# Patient Record
Sex: MALE | Race: WHITE | NOT HISPANIC OR LATINO | Employment: FULL TIME | URBAN - METROPOLITAN AREA
[De-identification: names, ages, dates, MRNs, and addresses within clinical notes are randomized per-mention and may not be internally consistent; named-entity substitution may affect disease eponyms.]

---

## 2017-08-30 ENCOUNTER — OUTSIDE FACILITY SERVICE (OUTPATIENT)
Dept: CARDIAC SURGERY | Facility: CLINIC | Age: 67
End: 2017-08-30

## 2017-08-30 PROCEDURE — 33523 CABG ART-VEIN SIX OR MORE: CPT | Performed by: THORACIC SURGERY (CARDIOTHORACIC VASCULAR SURGERY)

## 2017-08-30 PROCEDURE — 33508 ENDOSCOPIC VEIN HARVEST: CPT | Performed by: THORACIC SURGERY (CARDIOTHORACIC VASCULAR SURGERY)

## 2017-08-30 PROCEDURE — 33535 CABG ARTERIAL THREE: CPT | Performed by: THORACIC SURGERY (CARDIOTHORACIC VASCULAR SURGERY)

## 2017-09-13 ENCOUNTER — HOSPITAL ENCOUNTER (OUTPATIENT)
Dept: LAB | Facility: HOSPITAL | Age: 67
Discharge: HOME | End: 2017-09-13
Attending: FAMILY MEDICINE | Admitting: FAMILY MEDICINE

## 2017-10-09 ENCOUNTER — OFFICE VISIT (OUTPATIENT)
Dept: CARDIAC SURGERY | Facility: CLINIC | Age: 67
End: 2017-10-09

## 2017-10-09 VITALS
DIASTOLIC BLOOD PRESSURE: 78 MMHG | BODY MASS INDEX: 29.04 KG/M2 | HEIGHT: 68 IN | OXYGEN SATURATION: 97 % | RESPIRATION RATE: 20 BRPM | WEIGHT: 191.6 LBS | TEMPERATURE: 97.6 F | HEART RATE: 65 BPM | SYSTOLIC BLOOD PRESSURE: 122 MMHG

## 2017-10-09 DIAGNOSIS — Z95.1 S/P CABG (CORONARY ARTERY BYPASS GRAFT): Primary | ICD-10-CM

## 2017-10-09 PROCEDURE — 99024 POSTOP FOLLOW-UP VISIT: CPT | Performed by: NURSE PRACTITIONER

## 2017-10-09 RX ORDER — ALPRAZOLAM 0.25 MG/1
0.25 TABLET ORAL 2 TIMES DAILY PRN
COMMUNITY
End: 2017-10-09 | Stop reason: ALTCHOICE

## 2017-10-09 RX ORDER — HYDROCODONE BITARTRATE AND ACETAMINOPHEN 5; 325 MG/1; MG/1
TABLET ORAL
Refills: 0 | COMMUNITY
Start: 2017-09-08 | End: 2017-10-09

## 2017-10-09 RX ORDER — ASPIRIN 81 MG/1
TABLET ORAL
Refills: 0 | COMMUNITY
Start: 2017-09-08

## 2017-10-09 RX ORDER — METOCLOPRAMIDE 5 MG/1
TABLET ORAL
Refills: 0 | COMMUNITY
Start: 2017-09-08 | End: 2017-10-09 | Stop reason: ALTCHOICE

## 2017-10-09 RX ORDER — NITROGLYCERIN 2 %
OINTMENT (GRAM) TRANSDERMAL
Refills: 0 | COMMUNITY
Start: 2017-09-08 | End: 2017-10-09 | Stop reason: ALTCHOICE

## 2017-10-09 RX ORDER — AMIODARONE HYDROCHLORIDE 200 MG/1
TABLET ORAL
Refills: 0 | COMMUNITY
Start: 2017-09-08 | End: 2020-06-29

## 2017-10-09 RX ORDER — TRAZODONE HYDROCHLORIDE 50 MG/1
TABLET ORAL
Refills: 0 | COMMUNITY
Start: 2017-09-08 | End: 2017-10-09 | Stop reason: ALTCHOICE

## 2017-10-09 RX ORDER — HYDRALAZINE HYDROCHLORIDE 100 MG/1
50 TABLET, FILM COATED ORAL 2 TIMES DAILY
Refills: 0 | COMMUNITY
Start: 2017-09-08

## 2017-10-09 RX ORDER — ATORVASTATIN CALCIUM 40 MG/1
TABLET, FILM COATED ORAL
Refills: 0 | COMMUNITY
Start: 2017-09-08

## 2017-10-09 RX ORDER — PANTOPRAZOLE SODIUM 40 MG/1
TABLET, DELAYED RELEASE ORAL
Refills: 0 | COMMUNITY
Start: 2017-09-08 | End: 2017-10-09 | Stop reason: ALTCHOICE

## 2017-10-10 PROBLEM — Z95.1 S/P CABG (CORONARY ARTERY BYPASS GRAFT): Status: ACTIVE | Noted: 2017-10-10

## 2017-10-10 NOTE — PROGRESS NOTES
"10/10/2017        Subjective:      Amarilys Bear MD & Dr. Sanz    Chief Complaint of Post-op Follow-up      History of Present Illness:       Dear Dr. Amarilys Bear MD and Colleagues,  It was nice to see Gilberto Sanders in follow up today. He is status post CABG at [facility] by Dr. Kruger on 8/30/17. He reports he is doing very well.  He denies any c/o CP or SOA.  He has not been back to see Dr. Sanz but has an appt on 10/19/17.  His wife is with him today for his appt.  He wants to start driving and getting back to his \"life before surgery.\"    Patient Active Problem List   Diagnosis   • S/P CABG x10 by Dr. Kruger       Past Medical History:   Diagnosis Date   • Coronary artery disease    • Hyperlipidemia    • Hypertension    • Myocardial infarction        Past Surgical History:   Procedure Laterality Date   • APPENDECTOMY     • CORONARY ANGIOPLASTY     • CORONARY ARTERY BYPASS GRAFT  08/30/2017    X10 Dr Kruger       No Known Allergies    Review of Systems   Constitutional: Negative for fatigue.   Respiratory: Negative for shortness of breath.    Cardiovascular: Negative for chest pain, palpitations and leg swelling.   All other systems reviewed and are negative.        Current Outpatient Prescriptions:   •  amiodarone (PACERONE) 200 MG tablet, TAKE TWO (2) TABLETS BY MOUTH EVERY DAY, Disp: , Rfl: 0  •  aspirin  MG tablet, TAKE ONE (1) TABLET BY MOUTH EVERY DAY, Disp: , Rfl: 0  •  atorvastatin (LIPITOR) 40 MG tablet, TAKE ONE (1) TABLET BY MOUTH EVERY NIGHT AT BEDTIME, Disp: , Rfl: 0  •  hydrALAZINE (APRESOLINE) 100 MG tablet, TAKE ONE (1) TABLET BY MOUTH EVERY 8 HOURS, Disp: , Rfl: 0  •  metoprolol tartrate (LOPRESSOR) 25 MG tablet, TAKE ONE-HALF (1/2) TABLET BY MOUTH TWICE DAILY, Disp: , Rfl: 0    Social History     Social History   • Marital status:      Spouse name: N/A   • Number of children: N/A   • Years of education: N/A     Occupational History   • Not on file. "     Social History Main Topics   • Smoking status: Never Smoker   • Smokeless tobacco: Never Used   • Alcohol use No   • Drug use: No   • Sexual activity: Not on file     Other Topics Concern   • Not on file     Social History Narrative       No family history on file.        Physical Exam:      Vital Signs:  Weight: 191 lb 9.6 oz (86.9 kg)   Body mass index is 29.13 kg/(m^2).  Temp: 97.6 °F (36.4 °C)   Heart Rate: 65   BP: 122/78     Physical Exam   Constitutional: He is oriented to person, place, and time. He appears well-developed and well-nourished.   HENT:   Head: Normocephalic and atraumatic.   Cardiovascular: Normal rate, regular rhythm, normal heart sounds and intact distal pulses.    Pulmonary/Chest: Effort normal and breath sounds normal.   Neurological: He is alert and oriented to person, place, and time.   Skin: Skin is warm and dry.        Sternotomy & SVHS well healed.   Psychiatric: He has a normal mood and affect. His behavior is normal. Judgment and thought content normal.        Recommendation/Plan:     Gilberto CAROLANN Sanders was seen for post operative follow up. He is doing very well from a surgical standpoint. His incisions are healing well. I have released him to resume daily activities without restrictions. I told him he could drive short distances.  He has been released to PRN status.  Please call with any questions/concerns.    Thank you for allowing me to participate in his care.    Sincerely,    GEORGINA Blanchard

## 2017-10-12 ENCOUNTER — HOSPITAL ENCOUNTER (OUTPATIENT)
Dept: LAB | Facility: HOSPITAL | Age: 67
Discharge: HOME | End: 2017-10-12
Attending: FAMILY MEDICINE | Admitting: FAMILY MEDICINE

## 2017-10-12 LAB
ALBUMIN SERPL-MCNC: 4 G/DL (ref 3.5–4.8)
ALBUMIN/GLOB SERPL: 1.2 {RATIO} (ref 1–1.7)
ALP SERPL-CCNC: 70 IU/L (ref 32–91)
ALT SERPL-CCNC: ABNORMAL IU/L (ref 17–63)
ANION GAP SERPL CALC-SCNC: 13.3 MMOL/L (ref 10–20)
AST SERPL-CCNC: ABNORMAL IU/L (ref 15–41)
BACTERIA SPEC AEROBE CULT: NORMAL
BASOPHILS # BLD AUTO: 0.1 10*3/UL (ref 0–0.2)
BASOPHILS NFR BLD AUTO: 1 % (ref 0–2)
BILIRUB SERPL-MCNC: ABNORMAL MG/DL (ref 0.3–1.2)
BUN SERPL-MCNC: 24 MG/DL (ref 8–20)
BUN/CREAT SERPL: 20 (ref 6.2–20.3)
CALCIUM SERPL-MCNC: 10.2 MG/DL (ref 8.9–10.3)
CHLORIDE SERPL-SCNC: 104 MMOL/L (ref 101–111)
CHOLEST SERPL-MCNC: 128 MG/DL
CHOLEST/HDLC SERPL: 4.3 {RATIO}
CONV CO2: 25 MMOL/L (ref 22–32)
CONV LDL CHOLESTEROL DIRECT: 76 MG/DL (ref 0–100)
CONV TOTAL PROTEIN: 7.4 G/DL (ref 6.1–7.9)
CREAT UR-MCNC: 1.2 MG/DL (ref 0.7–1.2)
DIFFERENTIAL METHOD BLD: (no result)
EOSINOPHIL # BLD AUTO: 0.3 10*3/UL (ref 0–0.3)
EOSINOPHIL # BLD AUTO: 4 % (ref 0–3)
ERYTHROCYTE [DISTWIDTH] IN BLOOD BY AUTOMATED COUNT: 15 % (ref 11.5–14.5)
GLOBULIN UR ELPH-MCNC: 3.4 G/DL (ref 2.5–3.8)
GLUCOSE SERPL-MCNC: 81 MG/DL (ref 65–99)
HCT VFR BLD AUTO: 44.6 % (ref 40–54)
HDLC SERPL-MCNC: 30 MG/DL
HGB BLD-MCNC: 14.5 G/DL (ref 14–18)
LDLC/HDLC SERPL: 2.6 {RATIO}
LIPID INTERPRETATION: ABNORMAL
LYMPHOCYTES # BLD AUTO: 1.9 10*3/UL (ref 0.8–4.8)
LYMPHOCYTES NFR BLD AUTO: 23 % (ref 18–42)
Lab: NORMAL
MCH RBC QN AUTO: 30.8 PG (ref 26–32)
MCHC RBC AUTO-ENTMCNC: 32.6 G/DL (ref 32–36)
MCV RBC AUTO: 94.7 FL (ref 80–94)
MICRO REPORT STATUS: NORMAL
MONOCYTES # BLD AUTO: 0.7 10*3/UL (ref 0.1–1.3)
MONOCYTES NFR BLD AUTO: 9 % (ref 2–11)
NEUTROPHILS # BLD AUTO: 5.1 10*3/UL (ref 2.3–8.6)
NEUTROPHILS NFR BLD AUTO: 63 % (ref 50–75)
NRBC BLD AUTO-RTO: 0 /100{WBCS}
NRBC/RBC NFR BLD MANUAL: 0 10*3/UL
PLATELET # BLD AUTO: 233 10*3/UL (ref 150–450)
PMV BLD AUTO: 10.5 FL (ref 7.4–10.4)
POTASSIUM SERPL-SCNC: ABNORMAL MMOL/L (ref 3.6–5.1)
RBC # BLD AUTO: 4.71 10*6/UL (ref 4.6–6)
SODIUM SERPL-SCNC: 137 MMOL/L (ref 136–144)
SPECIMEN SOURCE: NORMAL
TRIGL SERPL-MCNC: 123 MG/DL
VLDLC SERPL CALC-MCNC: 21.7 MG/DL
WBC # BLD AUTO: 8.1 10*3/UL (ref 4.5–11.5)

## 2017-10-26 ENCOUNTER — HOSPITAL ENCOUNTER (OUTPATIENT)
Dept: OTHER | Facility: HOSPITAL | Age: 67
Setting detail: RECURRING SERIES
Discharge: HOME | End: 2018-02-13
Attending: INTERNAL MEDICINE | Admitting: INTERNAL MEDICINE

## 2018-01-02 ENCOUNTER — HOSPITAL ENCOUNTER (OUTPATIENT)
Dept: OTHER | Facility: HOSPITAL | Age: 68
Setting detail: RECURRING SERIES
Discharge: HOME | End: 2018-03-11
Attending: INTERNAL MEDICINE | Admitting: INTERNAL MEDICINE

## 2018-01-08 ENCOUNTER — HOSPITAL ENCOUNTER (OUTPATIENT)
Dept: PREADMISSION TESTING | Facility: HOSPITAL | Age: 68
Discharge: HOME | End: 2018-01-08
Attending: SURGERY | Admitting: SURGERY

## 2018-01-08 LAB
ANION GAP SERPL CALC-SCNC: 11 MMOL/L (ref 10–20)
BASOPHILS # BLD AUTO: 0.1 10*3/UL (ref 0–0.2)
BASOPHILS NFR BLD AUTO: 1 % (ref 0–2)
BUN SERPL-MCNC: 18 MG/DL (ref 8–20)
BUN/CREAT SERPL: 16.4 (ref 6.2–20.3)
CALCIUM SERPL-MCNC: 10.5 MG/DL (ref 8.9–10.3)
CHLORIDE SERPL-SCNC: 108 MMOL/L (ref 101–111)
CONV CO2: 26 MMOL/L (ref 22–32)
CREAT UR-MCNC: 1.1 MG/DL (ref 0.7–1.2)
DIFFERENTIAL METHOD BLD: (no result)
EOSINOPHIL # BLD AUTO: 0.3 10*3/UL (ref 0–0.3)
EOSINOPHIL # BLD AUTO: 3 % (ref 0–3)
ERYTHROCYTE [DISTWIDTH] IN BLOOD BY AUTOMATED COUNT: 15.8 % (ref 11.5–14.5)
GLUCOSE SERPL-MCNC: 87 MG/DL (ref 65–99)
HCT VFR BLD AUTO: 45.5 % (ref 40–54)
HGB BLD-MCNC: 14.7 G/DL (ref 14–18)
LYMPHOCYTES # BLD AUTO: 1.6 10*3/UL (ref 0.8–4.8)
LYMPHOCYTES NFR BLD AUTO: 20 % (ref 18–42)
MCH RBC QN AUTO: 31 PG (ref 26–32)
MCHC RBC AUTO-ENTMCNC: 32.3 G/DL (ref 32–36)
MCV RBC AUTO: 96 FL (ref 80–94)
MONOCYTES # BLD AUTO: 0.8 10*3/UL (ref 0.1–1.3)
MONOCYTES NFR BLD AUTO: 10 % (ref 2–11)
NEUTROPHILS # BLD AUTO: 5.1 10*3/UL (ref 2.3–8.6)
NEUTROPHILS NFR BLD AUTO: 66 % (ref 50–75)
NRBC BLD AUTO-RTO: 0 /100{WBCS}
NRBC/RBC NFR BLD MANUAL: 0 10*3/UL
PLATELET # BLD AUTO: 195 10*3/UL (ref 150–450)
PMV BLD AUTO: 10.5 FL (ref 7.4–10.4)
POTASSIUM SERPL-SCNC: 5 MMOL/L (ref 3.6–5.1)
RBC # BLD AUTO: 4.74 10*6/UL (ref 4.6–6)
SODIUM SERPL-SCNC: 140 MMOL/L (ref 136–144)
WBC # BLD AUTO: 7.9 10*3/UL (ref 4.5–11.5)

## 2018-01-25 ENCOUNTER — HOSPITAL ENCOUNTER (OUTPATIENT)
Dept: PREOP | Facility: HOSPITAL | Age: 68
Setting detail: HOSPITAL OUTPATIENT SURGERY
Discharge: HOME | End: 2018-01-25
Attending: SURGERY | Admitting: SURGERY

## 2018-03-07 ENCOUNTER — HOSPITAL ENCOUNTER (OUTPATIENT)
Dept: LAB | Facility: HOSPITAL | Age: 68
Discharge: HOME | End: 2018-03-07
Attending: FAMILY MEDICINE | Admitting: FAMILY MEDICINE

## 2018-03-07 LAB
ALBUMIN SERPL-MCNC: 4.3 G/DL (ref 3.5–4.8)
ALBUMIN/GLOB SERPL: 1.5 {RATIO} (ref 1–1.7)
ALP SERPL-CCNC: 62 IU/L (ref 32–91)
ALT SERPL-CCNC: 25 IU/L (ref 17–63)
ANION GAP SERPL CALC-SCNC: 10.6 MMOL/L (ref 10–20)
AST SERPL-CCNC: 23 IU/L (ref 15–41)
BASOPHILS # BLD AUTO: 0.1 10*3/UL (ref 0–0.2)
BASOPHILS NFR BLD AUTO: 1 % (ref 0–2)
BILIRUB SERPL-MCNC: 1.1 MG/DL (ref 0.3–1.2)
BUN SERPL-MCNC: 17 MG/DL (ref 8–20)
BUN/CREAT SERPL: 15.5 (ref 6.2–20.3)
CALCIUM SERPL-MCNC: 10.1 MG/DL (ref 8.9–10.3)
CHLORIDE SERPL-SCNC: 106 MMOL/L (ref 101–111)
CHOLEST SERPL-MCNC: 113 MG/DL
CHOLEST/HDLC SERPL: 4.5 {RATIO}
CONV CO2: 28 MMOL/L (ref 22–32)
CONV LDL CHOLESTEROL DIRECT: 57 MG/DL (ref 0–100)
CONV MICROALBUM.,U,RANDOM: 5 MG/L
CONV TOTAL PROTEIN: 7.1 G/DL (ref 6.1–7.9)
CREAT 24H UR-MCNC: 151 MG/DL
CREAT UR-MCNC: 1.1 MG/DL (ref 0.7–1.2)
DIFFERENTIAL METHOD BLD: (no result)
EOSINOPHIL # BLD AUTO: 0.2 10*3/UL (ref 0–0.3)
EOSINOPHIL # BLD AUTO: 4 % (ref 0–3)
ERYTHROCYTE [DISTWIDTH] IN BLOOD BY AUTOMATED COUNT: 14.9 % (ref 11.5–14.5)
GLOBULIN UR ELPH-MCNC: 2.8 G/DL (ref 2.5–3.8)
GLUCOSE SERPL-MCNC: 94 MG/DL (ref 65–99)
HCT VFR BLD AUTO: 47.6 % (ref 40–54)
HDLC SERPL-MCNC: 25 MG/DL
HGB BLD-MCNC: 15.4 G/DL (ref 14–18)
LDLC/HDLC SERPL: 2.2 {RATIO}
LIPID INTERPRETATION: ABNORMAL
LYMPHOCYTES # BLD AUTO: 1.9 10*3/UL (ref 0.8–4.8)
LYMPHOCYTES NFR BLD AUTO: 28 % (ref 18–42)
MCH RBC QN AUTO: 30.9 PG (ref 26–32)
MCHC RBC AUTO-ENTMCNC: 32.4 G/DL (ref 32–36)
MCV RBC AUTO: 95.2 FL (ref 80–94)
MICROALBUMIN/CREAT UR: 3.3 UG/MG
MONOCYTES # BLD AUTO: 0.6 10*3/UL (ref 0.1–1.3)
MONOCYTES NFR BLD AUTO: 9 % (ref 2–11)
NEUTROPHILS # BLD AUTO: 3.9 10*3/UL (ref 2.3–8.6)
NEUTROPHILS NFR BLD AUTO: 58 % (ref 50–75)
NRBC BLD AUTO-RTO: 0 /100{WBCS}
NRBC/RBC NFR BLD MANUAL: 0 10*3/UL
PLATELET # BLD AUTO: 191 10*3/UL (ref 150–450)
PMV BLD AUTO: 10.5 FL (ref 7.4–10.4)
POTASSIUM SERPL-SCNC: 4.6 MMOL/L (ref 3.6–5.1)
RBC # BLD AUTO: 5 10*6/UL (ref 4.6–6)
SODIUM SERPL-SCNC: 140 MMOL/L (ref 136–144)
TRIGL SERPL-MCNC: 154 MG/DL
URATE SERPL-MCNC: 4 MG/DL (ref 4.8–8.7)
VLDLC SERPL CALC-MCNC: 30.9 MG/DL
WBC # BLD AUTO: 6.6 10*3/UL (ref 4.5–11.5)

## 2018-06-28 ENCOUNTER — ON CAMPUS - OUTPATIENT (AMBULATORY)
Dept: URBAN - METROPOLITAN AREA HOSPITAL 2 | Facility: HOSPITAL | Age: 68
End: 2018-06-28
Payer: COMMERCIAL

## 2018-06-28 ENCOUNTER — OFFICE (AMBULATORY)
Dept: URBAN - METROPOLITAN AREA PATHOLOGY 4 | Facility: PATHOLOGY | Age: 68
End: 2018-06-28
Payer: COMMERCIAL

## 2018-06-28 ENCOUNTER — HOSPITAL ENCOUNTER (OUTPATIENT)
Dept: OTHER | Facility: HOSPITAL | Age: 68
Setting detail: SPECIMEN
Discharge: HOME | End: 2018-06-28
Attending: INTERNAL MEDICINE | Admitting: INTERNAL MEDICINE

## 2018-06-28 VITALS
DIASTOLIC BLOOD PRESSURE: 66 MMHG | DIASTOLIC BLOOD PRESSURE: 86 MMHG | OXYGEN SATURATION: 95 % | SYSTOLIC BLOOD PRESSURE: 124 MMHG | OXYGEN SATURATION: 93 % | SYSTOLIC BLOOD PRESSURE: 100 MMHG | SYSTOLIC BLOOD PRESSURE: 116 MMHG | DIASTOLIC BLOOD PRESSURE: 65 MMHG | OXYGEN SATURATION: 98 % | HEART RATE: 61 BPM | SYSTOLIC BLOOD PRESSURE: 156 MMHG | OXYGEN SATURATION: 99 % | HEART RATE: 63 BPM | SYSTOLIC BLOOD PRESSURE: 115 MMHG | SYSTOLIC BLOOD PRESSURE: 114 MMHG | RESPIRATION RATE: 18 BRPM | TEMPERATURE: 97.8 F | HEART RATE: 53 BPM | DIASTOLIC BLOOD PRESSURE: 72 MMHG | RESPIRATION RATE: 16 BRPM | HEART RATE: 66 BPM | SYSTOLIC BLOOD PRESSURE: 149 MMHG | DIASTOLIC BLOOD PRESSURE: 97 MMHG | DIASTOLIC BLOOD PRESSURE: 67 MMHG | HEART RATE: 60 BPM | HEART RATE: 64 BPM | HEART RATE: 65 BPM | SYSTOLIC BLOOD PRESSURE: 96 MMHG | SYSTOLIC BLOOD PRESSURE: 94 MMHG | DIASTOLIC BLOOD PRESSURE: 56 MMHG | WEIGHT: 206 LBS | HEART RATE: 52 BPM | DIASTOLIC BLOOD PRESSURE: 103 MMHG | OXYGEN SATURATION: 92 % | HEIGHT: 68 IN | DIASTOLIC BLOOD PRESSURE: 68 MMHG

## 2018-06-28 DIAGNOSIS — Z12.11 ENCOUNTER FOR SCREENING FOR MALIGNANT NEOPLASM OF COLON: ICD-10-CM

## 2018-06-28 DIAGNOSIS — D12.5 BENIGN NEOPLASM OF SIGMOID COLON: ICD-10-CM

## 2018-06-28 DIAGNOSIS — D12.2 BENIGN NEOPLASM OF ASCENDING COLON: ICD-10-CM

## 2018-06-28 LAB
GI HISTOLOGY: A. UNSPECIFIED: (no result)
GI HISTOLOGY: B. UNSPECIFIED: (no result)
GI HISTOLOGY: PDF REPORT: (no result)

## 2018-06-28 PROCEDURE — 88305 TISSUE EXAM BY PATHOLOGIST: CPT | Mod: 26 | Performed by: INTERNAL MEDICINE

## 2018-06-28 PROCEDURE — 45385 COLONOSCOPY W/LESION REMOVAL: CPT | Mod: PT | Performed by: INTERNAL MEDICINE

## 2018-06-28 RX ADMIN — PROPOFOL: 10 INJECTION, EMULSION INTRAVENOUS at 10:01

## 2018-10-11 ENCOUNTER — HOSPITAL ENCOUNTER (OUTPATIENT)
Dept: LAB | Facility: HOSPITAL | Age: 68
Discharge: HOME | End: 2018-10-11
Attending: FAMILY MEDICINE | Admitting: FAMILY MEDICINE

## 2018-10-11 LAB
ALBUMIN SERPL-MCNC: 4.1 G/DL (ref 3.5–4.8)
ALBUMIN/GLOB SERPL: 1.2 {RATIO} (ref 1–1.7)
ALP SERPL-CCNC: 64 IU/L (ref 32–91)
ALT SERPL-CCNC: 29 IU/L (ref 17–63)
ANION GAP SERPL CALC-SCNC: 12.8 MMOL/L (ref 10–20)
AST SERPL-CCNC: 25 IU/L (ref 15–41)
BILIRUB SERPL-MCNC: 1 MG/DL (ref 0.3–1.2)
BUN SERPL-MCNC: 17 MG/DL (ref 8–20)
BUN/CREAT SERPL: 15.5 (ref 6.2–20.3)
CALCIUM SERPL-MCNC: 10.2 MG/DL (ref 8.9–10.3)
CHLORIDE SERPL-SCNC: 108 MMOL/L (ref 101–111)
CHOLEST SERPL-MCNC: 102 MG/DL
CHOLEST/HDLC SERPL: 4.1 {RATIO}
CONV CO2: 28 MMOL/L (ref 22–32)
CONV LDL CHOLESTEROL DIRECT: 57 MG/DL (ref 0–100)
CONV TOTAL PROTEIN: 7.5 G/DL (ref 6.1–7.9)
CREAT UR-MCNC: 1.1 MG/DL (ref 0.7–1.2)
GLOBULIN UR ELPH-MCNC: 3.4 G/DL (ref 2.5–3.8)
GLUCOSE SERPL-MCNC: 92 MG/DL (ref 65–99)
HDLC SERPL-MCNC: 25 MG/DL
LDLC/HDLC SERPL: 2.3 {RATIO}
LIPID INTERPRETATION: ABNORMAL
POTASSIUM SERPL-SCNC: 4.8 MMOL/L (ref 3.6–5.1)
SODIUM SERPL-SCNC: 144 MMOL/L (ref 136–144)
TRIGL SERPL-MCNC: 167 MG/DL
URATE SERPL-MCNC: 4 MG/DL (ref 4.8–8.7)
VLDLC SERPL CALC-MCNC: 20.4 MG/DL

## 2019-06-01 ENCOUNTER — TRANSCRIBE ORDERS (OUTPATIENT)
Dept: CARDIOLOGY | Facility: CLINIC | Age: 69
End: 2019-06-01

## 2019-06-01 DIAGNOSIS — E78.5 HYPERLIPIDEMIA, UNSPECIFIED HYPERLIPIDEMIA TYPE: ICD-10-CM

## 2019-06-01 DIAGNOSIS — Z95.1 STATUS POST CORONARY ARTERY BYPASS GRAFT: ICD-10-CM

## 2019-06-01 DIAGNOSIS — I10 HYPERTENSION, UNSPECIFIED TYPE: ICD-10-CM

## 2019-06-01 DIAGNOSIS — I25.10 CVD (CARDIOVASCULAR DISEASE): Primary | ICD-10-CM

## 2019-08-16 PROBLEM — I10 HYPERTENSION: Status: ACTIVE | Noted: 2017-10-18

## 2019-08-16 PROBLEM — Z95.1 STATUS POST CORONARY ARTERY BYPASS GRAFT: Status: ACTIVE | Noted: 2017-10-18

## 2019-08-16 PROBLEM — L98.9 SKIN DISORDER: Status: ACTIVE | Noted: 2018-01-02

## 2019-08-16 PROBLEM — E78.5 HYPERLIPIDEMIA: Status: ACTIVE | Noted: 2017-10-18

## 2019-08-16 PROBLEM — Z95.5 STATUS POST CORONARY ARTERY STENT PLACEMENT: Status: ACTIVE | Noted: 2017-10-18

## 2019-08-16 PROBLEM — I25.10 CORONARY HEART DISEASE: Status: ACTIVE | Noted: 2017-10-18

## 2019-08-16 RX ORDER — ALLOPURINOL 300 MG/1
300 TABLET ORAL EVERY 24 HOURS
COMMUNITY
Start: 2017-10-19

## 2019-08-29 ENCOUNTER — HOSPITAL ENCOUNTER (OUTPATIENT)
Dept: CARDIOLOGY | Facility: HOSPITAL | Age: 69
Discharge: HOME | End: 2019-08-29

## 2019-08-29 ENCOUNTER — OFFICE VISIT (OUTPATIENT)
Dept: CARDIOLOGY | Facility: CLINIC | Age: 69
End: 2019-08-29

## 2019-08-29 VITALS
HEART RATE: 48 BPM | BODY MASS INDEX: 32.18 KG/M2 | WEIGHT: 205 LBS | DIASTOLIC BLOOD PRESSURE: 90 MMHG | HEIGHT: 67 IN | SYSTOLIC BLOOD PRESSURE: 160 MMHG

## 2019-08-29 DIAGNOSIS — I25.708 CORONARY ARTERY DISEASE OF BYPASS GRAFT OF NATIVE HEART WITH STABLE ANGINA PECTORIS (HCC): Primary | ICD-10-CM

## 2019-08-29 DIAGNOSIS — Z95.1 STATUS POST CORONARY ARTERY BYPASS GRAFT: ICD-10-CM

## 2019-08-29 DIAGNOSIS — E78.5 HYPERLIPIDEMIA, UNSPECIFIED HYPERLIPIDEMIA TYPE: ICD-10-CM

## 2019-08-29 DIAGNOSIS — I10 HYPERTENSION, UNSPECIFIED TYPE: ICD-10-CM

## 2019-08-29 DIAGNOSIS — I25.10 CVD (CARDIOVASCULAR DISEASE): ICD-10-CM

## 2019-08-29 DIAGNOSIS — I10 ESSENTIAL HYPERTENSION: ICD-10-CM

## 2019-08-29 DIAGNOSIS — E78.00 PURE HYPERCHOLESTEROLEMIA: ICD-10-CM

## 2019-08-29 LAB
BH CV STRESS COMMENTS STAGE 1: NORMAL
BH CV STRESS DOSE REGADENOSON STAGE 1: 0.4
BH CV STRESS DURATION MIN STAGE 1: 0
BH CV STRESS DURATION SEC STAGE 1: 10
BH CV STRESS PROTOCOL 1: NORMAL
BH CV STRESS RECOVERY BP: NORMAL MMHG
BH CV STRESS RECOVERY HR: 94 BPM
BH CV STRESS STAGE 1: 1
LV EF NUC BP: 59 %
MAXIMAL PREDICTED HEART RATE: 151 BPM
PERCENT MAX PREDICTED HR: 62.25 %
STRESS BASELINE BP: NORMAL MMHG
STRESS BASELINE HR: 48 BPM
STRESS PERCENT HR: 73 %
STRESS POST EXERCISE DUR MIN: 6 MIN
STRESS POST EXERCISE DUR SEC: 22 SEC
STRESS POST PEAK BP: NORMAL MMHG
STRESS POST PEAK HR: 94 BPM
STRESS TARGET HR: 128 BPM

## 2019-08-29 PROCEDURE — 78452 HT MUSCLE IMAGE SPECT MULT: CPT | Performed by: INTERNAL MEDICINE

## 2019-08-29 PROCEDURE — A9500 TC99M SESTAMIBI: HCPCS | Performed by: INTERNAL MEDICINE

## 2019-08-29 PROCEDURE — 93017 CV STRESS TEST TRACING ONLY: CPT

## 2019-08-29 PROCEDURE — 0 TECHNETIUM SESTAMIBI: Performed by: INTERNAL MEDICINE

## 2019-08-29 PROCEDURE — 78452 HT MUSCLE IMAGE SPECT MULT: CPT

## 2019-08-29 PROCEDURE — 93016 CV STRESS TEST SUPVJ ONLY: CPT | Performed by: INTERNAL MEDICINE

## 2019-08-29 PROCEDURE — 93018 CV STRESS TEST I&R ONLY: CPT | Performed by: INTERNAL MEDICINE

## 2019-08-29 PROCEDURE — 25010000002 REGADENOSON 0.4 MG/5ML SOLUTION: Performed by: INTERNAL MEDICINE

## 2019-08-29 PROCEDURE — 99213 OFFICE O/P EST LOW 20 MIN: CPT | Performed by: INTERNAL MEDICINE

## 2019-08-29 RX ADMIN — TECHNETIUM TC 99M SESTAMIBI 1 DOSE: 1 INJECTION INTRAVENOUS at 09:45

## 2019-08-29 RX ADMIN — REGADENOSON 0.4 MG: 0.08 INJECTION, SOLUTION INTRAVENOUS at 12:00

## 2019-08-29 NOTE — PROGRESS NOTES
"    Subjective:     Encounter Date:08/29/2019      Patient ID: Gilberto Sanders is a 69 y.o. male.    Chief Complaint:  History of Present Illness 69-year-old white male with history of coronary disease status post coronary artery bypass surgery history of hypertension hyperlipidemia presents to my office for follow-up.  Pain is currently stable without any signs of chest pain or shortness of breath at rest on exertion.  No complaint of any PND orthopnea.  No palpitations dizziness syncope or swelling of the feet.  He is taking his medicines regularly.  He does not smoke.  He is trying to exercise regularly.  He follows a good diet.    The following portions of the patient's history were reviewed and updated as appropriate: allergies, current medications, past family history, past medical history, past social history, past surgical history and problem list.  Past Medical History:   Diagnosis Date   • Coronary artery disease    • Hyperlipidemia    • Hypertension    • Myocardial infarction (CMS/HCC)      Past Surgical History:   Procedure Laterality Date   • APPENDECTOMY     • CORONARY ANGIOPLASTY     • CORONARY ARTERY BYPASS GRAFT  08/30/2017    X10 Dr Kruger     /90   Pulse (!) 48   Ht 170.2 cm (67\")   Wt 93 kg (205 lb)   BMI 32.11 kg/m²   Family History   Problem Relation Age of Onset   • Heart disease Father        Current Outpatient Medications:   •  allopurinol (ZYLOPRIM) 300 MG tablet, Daily., Disp: , Rfl:   •  aspirin  MG tablet, TAKE ONE (1) TABLET BY MOUTH EVERY DAY, Disp: , Rfl: 0  •  atorvastatin (LIPITOR) 40 MG tablet, TAKE ONE (1) TABLET BY MOUTH EVERY NIGHT AT BEDTIME, Disp: , Rfl: 0  •  hydrALAZINE (APRESOLINE) 100 MG tablet, TAKE ONE (1) TABLET BY MOUTH EVERY 8 HOURS, Disp: , Rfl: 0  •  metoprolol tartrate (LOPRESSOR) 25 MG tablet, TAKE ONE-HALF (1/2) TABLET BY MOUTH TWICE DAILY, Disp: , Rfl: 0  •  amiodarone (PACERONE) 200 MG tablet, TAKE TWO (2) TABLETS BY MOUTH EVERY DAY, Disp: , " Rfl: 0  No current facility-administered medications for this visit.     Facility-Administered Medications Ordered in Other Visits:   •  [COMPLETED] regadenoson (LEXISCAN) injection 0.4 mg, 0.4 mg, Intravenous, Once in imaging, Morris Sanz MD, 0.4 mg at 08/29/19 1200  No Known Allergies  Social History     Socioeconomic History   • Marital status:      Spouse name: Not on file   • Number of children: Not on file   • Years of education: Not on file   • Highest education level: Not on file   Tobacco Use   • Smoking status: Never Smoker   • Smokeless tobacco: Never Used   Substance and Sexual Activity   • Alcohol use: No   • Drug use: No     Review of Systems   Constitution: Negative for fever and malaise/fatigue.   Cardiovascular: Negative for chest pain, dyspnea on exertion and palpitations.   Respiratory: Negative for cough and shortness of breath.    Skin: Negative for rash.   Gastrointestinal: Negative for abdominal pain, nausea and vomiting.   Neurological: Negative for focal weakness and headaches.   All other systems reviewed and are negative.             Objective:     Physical Exam   Constitutional: He appears well-developed and well-nourished.   HENT:   Head: Normocephalic and atraumatic.   Eyes: Conjunctivae are normal. No scleral icterus.   Neck: Normal range of motion. Neck supple. No JVD present. Carotid bruit is not present.   Cardiovascular: Normal rate, regular rhythm, S1 normal, S2 normal, normal heart sounds and intact distal pulses. PMI is not displaced.   Pulmonary/Chest: Effort normal and breath sounds normal. He has no wheezes. He has no rales.   Abdominal: Soft. Bowel sounds are normal.   Neurological: He is alert. He has normal strength.   Skin: Skin is warm and dry. No rash noted.     Procedures    Lab Review:       Assessment:          Diagnosis Plan   1. Coronary artery disease of bypass graft of native heart with stable angina pectoris (CMS/HCC)     2. Pure hypercholesterolemia      3. Essential hypertension            Plan:       Patient has history of coronary disease status post coronary artery bypass surgery x10 vessels with normally function.  Patient has a stress test which showed inferoapical wall ischemia but since he does not have any symptoms he wants to try medical therapy.  If patient has any symptoms of angina or shortness of breath then will have a cardiac ablation performed.  Patient's blood pressure and heart are stable per  Patient lipid levels are followed by the primary care doctor.

## 2020-06-29 ENCOUNTER — OFFICE VISIT (OUTPATIENT)
Dept: CARDIOLOGY | Facility: CLINIC | Age: 70
End: 2020-06-29

## 2020-06-29 VITALS
WEIGHT: 209 LBS | SYSTOLIC BLOOD PRESSURE: 138 MMHG | HEART RATE: 42 BPM | OXYGEN SATURATION: 96 % | BODY MASS INDEX: 32.8 KG/M2 | HEIGHT: 67 IN | DIASTOLIC BLOOD PRESSURE: 73 MMHG

## 2020-06-29 DIAGNOSIS — I25.708 CORONARY ARTERY DISEASE OF BYPASS GRAFT OF NATIVE HEART WITH STABLE ANGINA PECTORIS (HCC): Primary | ICD-10-CM

## 2020-06-29 DIAGNOSIS — E78.00 PURE HYPERCHOLESTEROLEMIA: ICD-10-CM

## 2020-06-29 DIAGNOSIS — I10 ESSENTIAL HYPERTENSION: ICD-10-CM

## 2020-06-29 PROCEDURE — 99213 OFFICE O/P EST LOW 20 MIN: CPT | Performed by: INTERNAL MEDICINE

## 2020-06-29 NOTE — PROGRESS NOTES
"    Subjective:     Encounter Date:06/29/2020      Patient ID: Gilberto Sanders is a 70 y.o. male.    Chief Complaint:  History of Present Illness 70-year-old white male with history of coronary disease status post coronary bypass surgery history of hypertension hyperlipidemia presents to my office for follow-up.  Patient is currently stable without any symptoms of chest pain or shortness of breath at rest or exertion.  No complains of any PND orthopnea.  No palpitation dizziness syncope or swelling of the feet.  He is taking his medicine regularly.  He does not smoke.  Exercise regular.  He follows a good diet.    The following portions of the patient's history were reviewed and updated as appropriate: allergies, current medications, past family history, past medical history, past social history, past surgical history and problem list.  Past Medical History:   Diagnosis Date   • Coronary artery disease    • Hyperlipidemia    • Hypertension    • Myocardial infarction (CMS/HCC)      Past Surgical History:   Procedure Laterality Date   • APPENDECTOMY     • CORONARY ANGIOPLASTY     • CORONARY ARTERY BYPASS GRAFT  08/30/2017    X10 Dr Kruger     /73   Pulse (!) 42   Ht 170.2 cm (67\")   Wt 94.8 kg (209 lb)   SpO2 96%   BMI 32.73 kg/m²   Family History   Problem Relation Age of Onset   • Heart disease Father        Current Outpatient Medications:   •  allopurinol (ZYLOPRIM) 300 MG tablet, Daily., Disp: , Rfl:   •  aspirin  MG tablet, TAKE ONE (1) TABLET BY MOUTH EVERY DAY, Disp: , Rfl: 0  •  atorvastatin (LIPITOR) 40 MG tablet, TAKE ONE (1) TABLET BY MOUTH EVERY NIGHT AT BEDTIME, Disp: , Rfl: 0  •  hydrALAZINE (APRESOLINE) 100 MG tablet, TAKE ONE (1) TABLET BY MOUTH EVERY 8 HOURS, Disp: , Rfl: 0  •  metoprolol tartrate (LOPRESSOR) 25 MG tablet, TAKE ONE-HALF (1/2) TABLET BY MOUTH TWICE DAILY, Disp: , Rfl: 0  No Known Allergies  Social History     Socioeconomic History   • Marital status:      " Spouse name: Not on file   • Number of children: Not on file   • Years of education: Not on file   • Highest education level: Not on file   Tobacco Use   • Smoking status: Never Smoker   • Smokeless tobacco: Never Used   Substance and Sexual Activity   • Alcohol use: No   • Drug use: No     Review of Systems   Constitution: Negative for fever and malaise/fatigue.   Cardiovascular: Negative for chest pain, dyspnea on exertion, leg swelling and palpitations.   Respiratory: Negative for cough and shortness of breath.    Skin: Negative for rash.   Gastrointestinal: Negative for abdominal pain, nausea and vomiting.   Neurological: Negative for focal weakness, headaches, light-headedness and numbness.   All other systems reviewed and are negative.             Objective:     Physical Exam   Constitutional: He appears well-developed and well-nourished.   HENT:   Head: Normocephalic and atraumatic.   Eyes: Conjunctivae are normal. No scleral icterus.   Neck: Normal range of motion. Neck supple. No JVD present. Carotid bruit is not present.   Cardiovascular: Normal rate, regular rhythm, S1 normal, S2 normal, normal heart sounds and intact distal pulses. PMI is not displaced.   Pulmonary/Chest: Effort normal and breath sounds normal. He has no wheezes. He has no rales.   Abdominal: Soft. Bowel sounds are normal.   Neurological: He is alert. He has normal strength.   Skin: Skin is warm and dry. No rash noted.     Procedures    Lab Review:       Assessment:          Diagnosis Plan   1. Coronary artery disease of bypass graft of native heart with stable angina pectoris (CMS/[facility])     2. Pure hypercholesterolemia     3. Essential hypertension            Plan:       Patient has history of coronary status post coronary bypass surgery with normal LV function  Patient's blood pressure and heart rate stable  Patient's lipid levels are followed by the primary care doctor  Continue current medicines and follow him in 6

## 2021-01-18 ENCOUNTER — OFFICE VISIT (OUTPATIENT)
Dept: CARDIOLOGY | Facility: CLINIC | Age: 71
End: 2021-01-18

## 2021-01-18 VITALS
DIASTOLIC BLOOD PRESSURE: 81 MMHG | SYSTOLIC BLOOD PRESSURE: 143 MMHG | HEIGHT: 67 IN | OXYGEN SATURATION: 98 % | WEIGHT: 209 LBS | HEART RATE: 57 BPM | TEMPERATURE: 96.9 F | BODY MASS INDEX: 32.8 KG/M2

## 2021-01-18 DIAGNOSIS — I10 ESSENTIAL HYPERTENSION: ICD-10-CM

## 2021-01-18 DIAGNOSIS — I25.708 CORONARY ARTERY DISEASE OF BYPASS GRAFT OF NATIVE HEART WITH STABLE ANGINA PECTORIS (HCC): Primary | ICD-10-CM

## 2021-01-18 DIAGNOSIS — E78.00 PURE HYPERCHOLESTEROLEMIA: ICD-10-CM

## 2021-01-18 PROCEDURE — 93000 ELECTROCARDIOGRAM COMPLETE: CPT | Performed by: INTERNAL MEDICINE

## 2021-01-18 PROCEDURE — 99213 OFFICE O/P EST LOW 20 MIN: CPT | Performed by: INTERNAL MEDICINE

## 2021-01-18 NOTE — PROGRESS NOTES
"    Subjective:     Encounter Date:01/18/2021      Patient ID: Gilberto Sanders is a 70 y.o. male.    Chief Complaint:  History of Present Illness 70-year-old white male with history of coronary status post MI and coronary bypass surgery history of hypertension hyperlipidemia presents to my office for follow-up.  Patient is currently stable without any symptoms of chest pain or shortness of breath at rest or exertion.  No complains of any PND orthopnea.  No palpitation dizziness syncope or swelling of the feet.  Patient has been taking all the medicines regularly.  Patient does not smoke.  Patient has tried exercise regularly.  Follows a good diet.    The following portions of the patient's history were reviewed and updated as appropriate: allergies, current medications, past family history, past medical history, past social history, past surgical history and problem list.  Past Medical History:   Diagnosis Date   • Coronary artery disease    • Hyperlipidemia    • Hypertension    • Myocardial infarction (CMS/HCC)      Past Surgical History:   Procedure Laterality Date   • APPENDECTOMY     • CORONARY ANGIOPLASTY     • CORONARY ARTERY BYPASS GRAFT  08/30/2017    X10 Dr Kruger     /81 (BP Location: Left arm, Patient Position: Sitting)   Pulse 57   Temp 96.9 °F (36.1 °C)   Ht 170.2 cm (67\")   Wt 94.8 kg (209 lb)   SpO2 98%   BMI 32.73 kg/m²   Family History   Problem Relation Age of Onset   • Heart disease Father        Current Outpatient Medications:   •  allopurinol (ZYLOPRIM) 300 MG tablet, Daily., Disp: , Rfl:   •  aspirin 81 MG EC tablet, , Disp: , Rfl: 0  •  atorvastatin (LIPITOR) 40 MG tablet, TAKE ONE (1) TABLET BY MOUTH EVERY NIGHT AT BEDTIME, Disp: , Rfl: 0  •  hydrALAZINE (APRESOLINE) 100 MG tablet, TAKE ONE (1) TABLET BY MOUTH EVERY 8 HOURS, Disp: , Rfl: 0  •  metoprolol tartrate (LOPRESSOR) 25 MG tablet, TAKE ONE-HALF (1/2) TABLET BY MOUTH TWICE DAILY, Disp: , Rfl: 0  No Known Allergies  Social " History     Socioeconomic History   • Marital status:      Spouse name: Not on file   • Number of children: Not on file   • Years of education: Not on file   • Highest education level: Not on file   Tobacco Use   • Smoking status: Never Smoker   • Smokeless tobacco: Never Used   Substance and Sexual Activity   • Alcohol use: No   • Drug use: No     Review of Systems   Constitution: Negative for fever and malaise/fatigue.   Cardiovascular: Negative for chest pain, dyspnea on exertion and palpitations.   Respiratory: Negative for cough and shortness of breath.    Skin: Negative for rash.   Gastrointestinal: Negative for abdominal pain, nausea and vomiting.   Neurological: Negative for focal weakness and headaches.   All other systems reviewed and are negative.             Objective:     Constitutional:       Appearance: Well-developed.   Eyes:      General: No scleral icterus.     Conjunctiva/sclera: Conjunctivae normal.   HENT:      Head: Normocephalic and atraumatic.   Neck:      Musculoskeletal: Normal range of motion and neck supple.      Vascular: No carotid bruit or JVD.   Pulmonary:      Effort: Pulmonary effort is normal.      Breath sounds: Normal breath sounds. No wheezing. No rales.   Cardiovascular:      Normal rate. Regular rhythm.   Pulses:     Intact distal pulses.   Abdominal:      General: Bowel sounds are normal.      Palpations: Abdomen is soft.   Skin:     General: Skin is warm and dry.      Findings: No rash.   Neurological:      Mental Status: Alert.         ECG 12 Lead    Date/Time: 1/18/2021 1:59 PM  Performed by: Morris Sanz MD  Authorized by: Morris Sanz MD   Comments: Sinus rhythm with PVCs  Abnormal EKG  No new changes from previous EKG            Lab Review:         MDM   #1 coronary artery disease    patient had a coronary artery bypass surgery x10 vessels and has normal LV systolic function is currently stable on medical therapy  2.  Hypertension  Patient blood pressure is  currently stable on medications including beta-blockers and hydralazine  Patient not on ACE inhibitor's because of renal insufficiency  3.  Hyperlipidemia  Patient has been on statins and his lipid levels are followed by the primary care doctor.

## 2021-08-04 ENCOUNTER — OFFICE VISIT (OUTPATIENT)
Dept: CARDIOLOGY | Facility: CLINIC | Age: 71
End: 2021-08-04

## 2021-08-04 VITALS
WEIGHT: 196 LBS | OXYGEN SATURATION: 97 % | SYSTOLIC BLOOD PRESSURE: 153 MMHG | HEIGHT: 67 IN | BODY MASS INDEX: 30.76 KG/M2 | HEART RATE: 53 BPM | DIASTOLIC BLOOD PRESSURE: 83 MMHG

## 2021-08-04 DIAGNOSIS — I10 ESSENTIAL HYPERTENSION: ICD-10-CM

## 2021-08-04 DIAGNOSIS — I25.708 CORONARY ARTERY DISEASE OF BYPASS GRAFT OF NATIVE HEART WITH STABLE ANGINA PECTORIS (HCC): Primary | ICD-10-CM

## 2021-08-04 DIAGNOSIS — E78.00 PURE HYPERCHOLESTEROLEMIA: ICD-10-CM

## 2021-08-04 PROCEDURE — 99213 OFFICE O/P EST LOW 20 MIN: CPT | Performed by: INTERNAL MEDICINE

## 2021-08-04 NOTE — PROGRESS NOTES
"    Subjective:     Encounter Date:08/04/2021      Patient ID: Gilberto Sanders is a 71 y.o. male.    Chief Complaint:  History of Present Illness 71-year-old white male with history of coronary disease hypertension hyperlipidemia presents to my office for follow-up.  Patient is currently stable without any symptoms of chest pain or shortness of breath at rest on exertion.  No complaints any PND orthopnea.  No palpitation dizziness syncope or swelling of the feet but is taking medicine regularly but he does not smoke but is trying exercise regularly follows a good diet    The following portions of the patient's history were reviewed and updated as appropriate: allergies, current medications, past family history, past medical history, past social history, past surgical history and problem list.  Past Medical History:   Diagnosis Date   • Coronary artery disease    • Hyperlipidemia    • Hypertension    • Myocardial infarction (CMS/HCC)      Past Surgical History:   Procedure Laterality Date   • APPENDECTOMY     • CORONARY ANGIOPLASTY     • CORONARY ARTERY BYPASS GRAFT  08/30/2017    X10 Dr Kruger     /83 (BP Location: Left arm, Patient Position: Sitting)   Pulse 53   Ht 170.2 cm (67\")   Wt 88.9 kg (196 lb)   SpO2 97%   BMI 30.70 kg/m²   Family History   Problem Relation Age of Onset   • Heart disease Father        Current Outpatient Medications:   •  allopurinol (ZYLOPRIM) 300 MG tablet, Daily., Disp: , Rfl:   •  aspirin 81 MG EC tablet, , Disp: , Rfl: 0  •  atorvastatin (LIPITOR) 40 MG tablet, TAKE ONE (1) TABLET BY MOUTH EVERY NIGHT AT BEDTIME, Disp: , Rfl: 0  •  hydrALAZINE (APRESOLINE) 100 MG tablet, TAKE ONE (1) TABLET BY MOUTH EVERY 8 HOURS, Disp: , Rfl: 0  •  metoprolol tartrate (LOPRESSOR) 25 MG tablet, TAKE ONE-HALF (1/2) TABLET BY MOUTH TWICE DAILY, Disp: , Rfl: 0  No Known Allergies  Social History     Socioeconomic History   • Marital status:      Spouse name: Not on file   • Number of " children: Not on file   • Years of education: Not on file   • Highest education level: Not on file   Tobacco Use   • Smoking status: Never Smoker   • Smokeless tobacco: Never Used   Substance and Sexual Activity   • Alcohol use: No   • Drug use: No     Review of Systems   Constitutional: Negative for fever and malaise/fatigue.   Cardiovascular: Negative for chest pain, dyspnea on exertion and palpitations.   Respiratory: Negative for cough and shortness of breath.    Skin: Negative for rash.   Gastrointestinal: Negative for abdominal pain, nausea and vomiting.   Neurological: Negative for focal weakness and headaches.   All other systems reviewed and are negative.             Objective:     Constitutional:       Appearance: Well-developed.   Eyes:      General: No scleral icterus.     Conjunctiva/sclera: Conjunctivae normal.   HENT:      Head: Normocephalic and atraumatic.   Neck:      Vascular: No carotid bruit or JVD.   Pulmonary:      Effort: Pulmonary effort is normal.      Breath sounds: Normal breath sounds. No wheezing. No rales.   Cardiovascular:      Normal rate. Regular rhythm.   Pulses:     Intact distal pulses.   Abdominal:      General: Bowel sounds are normal.      Palpations: Abdomen is soft.   Musculoskeletal:      Cervical back: Normal range of motion and neck supple. Skin:     General: Skin is warm and dry.      Findings: No rash.   Neurological:      Mental Status: Alert.       Procedures    Lab Review:         MDM  1.  Coronary disease  Patient had coronary bypass surgery x4 vessels with a LIMA to LAD and saphenous graft to the diagonal branch marginal branch and RCA  Patient has normal LV systolic function  2.  Hypertension  Patient blood pressure currently stable on medications  3.  Hyperlipidemia  Patient's lipid levels are followed by the primary care doctor.      Patient's previous medical records, labs, and EKG were reviewed and discussed with the patient at today's visit.

## 2022-02-24 ENCOUNTER — OFFICE VISIT (OUTPATIENT)
Dept: CARDIOLOGY | Facility: CLINIC | Age: 72
End: 2022-02-24

## 2022-02-24 VITALS
SYSTOLIC BLOOD PRESSURE: 130 MMHG | WEIGHT: 195 LBS | OXYGEN SATURATION: 96 % | BODY MASS INDEX: 30.61 KG/M2 | DIASTOLIC BLOOD PRESSURE: 75 MMHG | HEIGHT: 67 IN | HEART RATE: 67 BPM

## 2022-02-24 DIAGNOSIS — E78.00 PURE HYPERCHOLESTEROLEMIA: ICD-10-CM

## 2022-02-24 DIAGNOSIS — I25.708 CORONARY ARTERY DISEASE OF BYPASS GRAFT OF NATIVE HEART WITH STABLE ANGINA PECTORIS: Primary | ICD-10-CM

## 2022-02-24 DIAGNOSIS — I10 ESSENTIAL HYPERTENSION: ICD-10-CM

## 2022-02-24 PROCEDURE — 99213 OFFICE O/P EST LOW 20 MIN: CPT | Performed by: INTERNAL MEDICINE

## 2022-02-24 NOTE — PROGRESS NOTES
"    Subjective:     Encounter Date:02/24/2022      Patient ID: Gilberto Sanders is a 71 y.o. male.    Chief Complaint:  History of Present Illness 71-year-old white male with history of coronary status post carotid bypass surgery hypertension hyperlipidemia presents to my office for follow-up.  Patient is currently stable without any symptoms of chest pain or shortness of breath at rest on exertion.  No complains of any PND orthopnea.  No palpitation dizziness syncope or swelling of the feet.  Patient has been taking all the medicines regularly.  Patient does not smoke.  Patient is trying to exercise regularly.  Follows a good diet.    The following portions of the patient's history were reviewed and updated as appropriate: allergies, current medications, past family history, past medical history, past social history, past surgical history and problem list.  Past Medical History:   Diagnosis Date   • Coronary artery disease    • Hyperlipidemia    • Hypertension    • Myocardial infarction (HCC)      Past Surgical History:   Procedure Laterality Date   • APPENDECTOMY     • CORONARY ANGIOPLASTY     • CORONARY ARTERY BYPASS GRAFT  08/30/2017    X10 Dr Krguer   • SKIN CANCER EXCISION       /75 (BP Location: Left arm, Patient Position: Sitting)   Pulse 67   Ht 170.2 cm (67\")   Wt 88.5 kg (195 lb)   SpO2 96%   BMI 30.54 kg/m²   Family History   Problem Relation Age of Onset   • Heart disease Father        Current Outpatient Medications:   •  allopurinol (ZYLOPRIM) 300 MG tablet, Daily., Disp: , Rfl:   •  aspirin 81 MG EC tablet, , Disp: , Rfl: 0  •  atorvastatin (LIPITOR) 40 MG tablet, TAKE ONE (1) TABLET BY MOUTH EVERY NIGHT AT BEDTIME, Disp: , Rfl: 0  •  hydrALAZINE (APRESOLINE) 100 MG tablet, TAKE ONE (1) TABLET BY MOUTH EVERY 8 HOURS, Disp: , Rfl: 0  •  metoprolol tartrate (LOPRESSOR) 25 MG tablet, TAKE ONE-HALF (1/2) TABLET BY MOUTH TWICE DAILY, Disp: , Rfl: 0  No Known Allergies  Social History "     Socioeconomic History   • Marital status:    Tobacco Use   • Smoking status: Never Smoker   • Smokeless tobacco: Never Used   Substance and Sexual Activity   • Alcohol use: No   • Drug use: No     Review of Systems   Constitutional: Negative for fever and malaise/fatigue.   Cardiovascular: Negative for chest pain, dyspnea on exertion and palpitations.   Respiratory: Negative for cough and shortness of breath.    Skin: Negative for rash.   Gastrointestinal: Negative for abdominal pain, nausea and vomiting.   Neurological: Negative for focal weakness and headaches.   All other systems reviewed and are negative.             Objective:     Constitutional:       Appearance: Well-developed.   Eyes:      General: No scleral icterus.     Conjunctiva/sclera: Conjunctivae normal.   HENT:      Head: Normocephalic and atraumatic.   Neck:      Vascular: No carotid bruit or JVD.   Pulmonary:      Effort: Pulmonary effort is normal.      Breath sounds: Normal breath sounds. No wheezing. No rales.   Cardiovascular:      Normal rate. Regular rhythm.   Pulses:     Intact distal pulses.   Abdominal:      General: Bowel sounds are normal.      Palpations: Abdomen is soft.   Musculoskeletal:      Cervical back: Normal range of motion and neck supple. Skin:     General: Skin is warm and dry.      Findings: No rash.   Neurological:      Mental Status: Alert.       Procedures    Lab Review:         MDM  1.  Coronary disease  Patient had coronary bypass surgery x10 vessels and has normal function is currently stable  2.  Hypertension  Patient blood pressure currently stable on medications  3.  Hyperlipidemia  Patient is on statins and the lipid levels are well within normal limits.      Patient's previous medical records, labs, and EKG were reviewed and discussed with the patient at today's visit.

## 2022-10-12 ENCOUNTER — OFFICE VISIT (OUTPATIENT)
Dept: CARDIOLOGY | Facility: CLINIC | Age: 72
End: 2022-10-12

## 2022-10-12 VITALS
HEIGHT: 67 IN | HEART RATE: 56 BPM | BODY MASS INDEX: 30.92 KG/M2 | OXYGEN SATURATION: 97 % | DIASTOLIC BLOOD PRESSURE: 88 MMHG | WEIGHT: 197 LBS | SYSTOLIC BLOOD PRESSURE: 150 MMHG

## 2022-10-12 DIAGNOSIS — I10 ESSENTIAL HYPERTENSION: ICD-10-CM

## 2022-10-12 DIAGNOSIS — E78.00 PURE HYPERCHOLESTEROLEMIA: ICD-10-CM

## 2022-10-12 DIAGNOSIS — I25.708 CORONARY ARTERY DISEASE OF BYPASS GRAFT OF NATIVE HEART WITH STABLE ANGINA PECTORIS: Primary | ICD-10-CM

## 2022-10-12 PROCEDURE — 99213 OFFICE O/P EST LOW 20 MIN: CPT | Performed by: INTERNAL MEDICINE

## 2022-10-12 NOTE — PROGRESS NOTES
"    Subjective:     Encounter Date:10/12/2022      Patient ID: Gilberto Sanders is a 72 y.o. male.    Chief Complaint:  History of Present Illness 72-year-old white male with history of coronary disease hypertension hyperlipidemia presents to my office for follow-up.  Patient is currently stable without any symptoms of chest pain or shortness of breath at rest on exertion.  No complains any PND orthopnea.  No palpitation dizziness syncope or swelling of the feet.  Patient has been taking all the medicines regularly.  Patient does not smoke.  Patient is trying to exercise regular.  Patient follows a good diet.    The following portions of the patient's history were reviewed and updated as appropriate: allergies, current medications, past family history, past medical history, past social history, past surgical history and problem list.  Past Medical History:   Diagnosis Date   • Coronary artery disease    • Hyperlipidemia    • Hypertension    • Myocardial infarction (HCC)      Past Surgical History:   Procedure Laterality Date   • APPENDECTOMY     • CORONARY ANGIOPLASTY     • CORONARY ARTERY BYPASS GRAFT  08/30/2017    X10 Dr Kruger   • SKIN CANCER EXCISION       /88   Pulse 56   Ht 170.2 cm (67\")   Wt 89.4 kg (197 lb)   SpO2 97%   BMI 30.85 kg/m²   Family History   Problem Relation Age of Onset   • Heart disease Father        Current Outpatient Medications:   •  allopurinol (ZYLOPRIM) 300 MG tablet, Take 1 tablet by mouth Daily., Disp: , Rfl:   •  aspirin 81 MG EC tablet, , Disp: , Rfl: 0  •  atorvastatin (LIPITOR) 40 MG tablet, TAKE ONE (1) TABLET BY MOUTH EVERY NIGHT AT BEDTIME, Disp: , Rfl: 0  •  hydrALAZINE (APRESOLINE) 100 MG tablet, Take 50 mg by mouth 2 (Two) Times a Day., Disp: , Rfl: 0  •  metoprolol tartrate (LOPRESSOR) 25 MG tablet, TAKE ONE-HALF (1/2) TABLET BY MOUTH TWICE DAILY, Disp: , Rfl: 0  No Known Allergies  Social History     Socioeconomic History   • Marital status:    Tobacco " Use   • Smoking status: Never   • Smokeless tobacco: Never   Substance and Sexual Activity   • Alcohol use: No   • Drug use: No     Review of Systems   Constitutional: Negative for fever and malaise/fatigue.   Cardiovascular: Negative for chest pain, dyspnea on exertion and palpitations.   Respiratory: Negative for cough and shortness of breath.    Skin: Negative for rash.   Gastrointestinal: Negative for abdominal pain, nausea and vomiting.   Neurological: Negative for focal weakness and headaches.   All other systems reviewed and are negative.             Objective:     Constitutional:       Appearance: Well-developed.   Eyes:      General: No scleral icterus.     Conjunctiva/sclera: Conjunctivae normal.   HENT:      Head: Normocephalic and atraumatic.   Neck:      Vascular: No carotid bruit or JVD.   Pulmonary:      Effort: Pulmonary effort is normal.      Breath sounds: Normal breath sounds. No wheezing. No rales.   Cardiovascular:      Normal rate. Regular rhythm.   Pulses:     Intact distal pulses.   Abdominal:      General: Bowel sounds are normal.      Palpations: Abdomen is soft.   Musculoskeletal:      Cervical back: Normal range of motion and neck supple. Skin:     General: Skin is warm and dry.      Findings: No rash.   Neurological:      Mental Status: Alert.       Procedures    Lab Review:         MDM  1.  Coronary disease  Patient had coronary bypass surgery x10 vessels with a LIMA to the LAD and a saphenous graft to the other branches and has normal function is currently stable on medications  2.  Hypertension  Patient blood pressure currently stable on hydralazine and metoprolol  3.  Hyperlipidemia  Pains on Lipitor and the lipid levels are well within normal limits.      Patient's previous medical records, labs, and EKG were reviewed and discussed with the patient at today's visit.

## 2023-04-12 ENCOUNTER — OFFICE VISIT (OUTPATIENT)
Dept: CARDIOLOGY | Facility: CLINIC | Age: 73
End: 2023-04-12
Payer: MEDICARE

## 2023-04-12 VITALS
HEIGHT: 67 IN | SYSTOLIC BLOOD PRESSURE: 124 MMHG | HEART RATE: 68 BPM | OXYGEN SATURATION: 98 % | DIASTOLIC BLOOD PRESSURE: 71 MMHG | BODY MASS INDEX: 29.82 KG/M2 | WEIGHT: 190 LBS

## 2023-04-12 DIAGNOSIS — R55 SYNCOPE AND COLLAPSE: ICD-10-CM

## 2023-04-12 DIAGNOSIS — E78.00 PURE HYPERCHOLESTEROLEMIA: ICD-10-CM

## 2023-04-12 DIAGNOSIS — I10 ESSENTIAL HYPERTENSION: ICD-10-CM

## 2023-04-12 DIAGNOSIS — I25.708 CORONARY ARTERY DISEASE OF BYPASS GRAFT OF NATIVE HEART WITH STABLE ANGINA PECTORIS: Primary | ICD-10-CM

## 2023-04-12 PROCEDURE — 1159F MED LIST DOCD IN RCRD: CPT | Performed by: INTERNAL MEDICINE

## 2023-04-12 PROCEDURE — 1160F RVW MEDS BY RX/DR IN RCRD: CPT | Performed by: INTERNAL MEDICINE

## 2023-04-12 PROCEDURE — 99214 OFFICE O/P EST MOD 30 MIN: CPT | Performed by: INTERNAL MEDICINE

## 2023-04-12 PROCEDURE — 3074F SYST BP LT 130 MM HG: CPT | Performed by: INTERNAL MEDICINE

## 2023-04-12 PROCEDURE — 3078F DIAST BP <80 MM HG: CPT | Performed by: INTERNAL MEDICINE

## 2023-04-12 NOTE — PROGRESS NOTES
"    Subjective:     Encounter Date:04/12/2023      Patient ID: Gilberto Sanders is a 73 y.o. male.    Chief Complaint:  History of Present Illness 73-year-old white male with history of coronary status post coronary bypass surgery in the past history of hypertension hyperlipidemia presents to office for a follow-up.  Patient is currently stable without concerns of chest pain or shortness of breath at rest or exertion radiograms any PND orthopnea.  No palpitations but had dizziness and syncope and recently patient was noted to have bradycardia.  No complains of any swelling of the feet.  He is taking medicine regularly.    The following portions of the patient's history were reviewed and updated as appropriate: allergies, current medications, past family history, past medical history, past social history, past surgical history and problem list.  Past Medical History:   Diagnosis Date   • Coronary artery disease    • Hyperlipidemia    • Hypertension    • Myocardial infarction      Past Surgical History:   Procedure Laterality Date   • APPENDECTOMY     • CORONARY ANGIOPLASTY     • CORONARY ARTERY BYPASS GRAFT  08/30/2017    X10 Dr Kruger   • SKIN CANCER EXCISION       /71   Pulse 68   Ht 170.2 cm (67.01\")   Wt 86.2 kg (190 lb)   SpO2 98%   BMI 29.75 kg/m²   Family History   Problem Relation Age of Onset   • Heart disease Father        Current Outpatient Medications:   •  allopurinol (ZYLOPRIM) 300 MG tablet, Take 1 tablet by mouth Daily., Disp: , Rfl:   •  aspirin 81 MG EC tablet, , Disp: , Rfl: 0  •  atorvastatin (LIPITOR) 40 MG tablet, TAKE ONE (1) TABLET BY MOUTH EVERY NIGHT AT BEDTIME, Disp: , Rfl: 0  •  hydrALAZINE (APRESOLINE) 100 MG tablet, Take 50 mg by mouth 2 (Two) Times a Day., Disp: , Rfl: 0  •  metoprolol tartrate (LOPRESSOR) 25 MG tablet, TAKE ONE-HALF (1/2) TABLET BY MOUTH TWICE DAILY, Disp: , Rfl: 0  No Known Allergies  Social History     Socioeconomic History   • Marital status:  "   Tobacco Use   • Smoking status: Never   • Smokeless tobacco: Never   Vaping Use   • Vaping Use: Never used   Substance and Sexual Activity   • Alcohol use: No   • Drug use: No   • Sexual activity: Defer     Review of Systems   Constitutional: Negative for malaise/fatigue.   Cardiovascular: Positive for syncope. Negative for chest pain, dyspnea on exertion, leg swelling and palpitations.   Respiratory: Negative for cough and shortness of breath.    Gastrointestinal: Negative for abdominal pain, nausea and vomiting.   Neurological: Positive for dizziness. Negative for focal weakness, headaches, light-headedness and numbness.   All other systems reviewed and are negative.             Objective:     Constitutional:       Appearance: Well-developed.   Eyes:      General: No scleral icterus.     Conjunctiva/sclera: Conjunctivae normal.   HENT:      Head: Normocephalic and atraumatic.   Neck:      Vascular: No carotid bruit or JVD.   Pulmonary:      Effort: Pulmonary effort is normal.      Breath sounds: Normal breath sounds. No wheezing. No rales.   Cardiovascular:      Normal rate. Regular rhythm.   Pulses:     Intact distal pulses.   Abdominal:      General: Bowel sounds are normal.      Palpations: Abdomen is soft.   Musculoskeletal:      Cervical back: Normal range of motion and neck supple. Skin:     General: Skin is warm and dry.      Findings: No rash.   Neurological:      Mental Status: Alert.       Procedures    Lab Review:         MDM  1.  Syncope  Patient has been having some dizziness and near syncopal episodes.  Patient is on beta-blockers and his heart rate has been low and hence I will stop it  Patient also will have a Holter monitor for 15 days and then will decide further treatment after that  2.  Coronary disease  Patient had coronary bypass surgery x10 vessels and his heart function has been normal in the past  #3 hypertension  Patient blood pressure currently stable on hydralazine  4.   Hyperlipidemia  Patient on atorvastatin the lipid levels are well within normal limits      Patient's previous medical records, labs, and EKG were reviewed and discussed with the patient at today's visit.

## 2023-04-17 ENCOUNTER — TELEPHONE (OUTPATIENT)
Dept: CARDIOLOGY | Facility: CLINIC | Age: 73
End: 2023-04-17
Payer: MEDICARE

## 2023-04-17 NOTE — TELEPHONE ENCOUNTER
RECEIVED URGENT MD NOTIFICATION FROM [facility]     HOLTER MONITOR - SCAN - URGENT MD NOTIFICATION 4/17/2023 (04/17/2023)

## 2023-04-18 NOTE — TELEPHONE ENCOUNTER
Called patient.  I showed the report to  and we will make arrangements to have the patient come to the office.

## 2023-04-20 ENCOUNTER — TELEPHONE (OUTPATIENT)
Dept: CARDIOLOGY | Facility: CLINIC | Age: 73
End: 2023-04-20
Payer: MEDICARE

## 2023-04-20 RX ORDER — DIGOXIN 125 MCG
125 TABLET ORAL DAILY
Qty: 90 TABLET | Refills: 3 | Status: SHIPPED | OUTPATIENT
Start: 2023-04-20

## 2023-04-20 NOTE — TELEPHONE ENCOUNTER
Called patient and prescribed him Xarelto 20 mg and digoxin 125 mcg once a day.  He will also need an appointment with dr Vianey marx   Detail Level: Detailed

## 2023-04-20 NOTE — TELEPHONE ENCOUNTER
Patient thought Dr. Sanz was calling in a couple medications for him. Patient did not know name of medications.

## 2023-05-16 ENCOUNTER — OFFICE VISIT (OUTPATIENT)
Dept: CARDIOLOGY | Facility: CLINIC | Age: 73
End: 2023-05-16
Payer: MEDICARE

## 2023-05-16 ENCOUNTER — PREP FOR SURGERY (OUTPATIENT)
Dept: OTHER | Facility: HOSPITAL | Age: 73
End: 2023-05-16
Payer: MEDICARE

## 2023-05-16 VITALS
DIASTOLIC BLOOD PRESSURE: 70 MMHG | OXYGEN SATURATION: 98 % | HEIGHT: 67 IN | BODY MASS INDEX: 28.56 KG/M2 | SYSTOLIC BLOOD PRESSURE: 139 MMHG | HEART RATE: 64 BPM | WEIGHT: 182 LBS

## 2023-05-16 DIAGNOSIS — Z95.1 STATUS POST CORONARY ARTERY BYPASS GRAFT: Primary | ICD-10-CM

## 2023-05-16 DIAGNOSIS — I48.0 PAROXYSMAL ATRIAL FIBRILLATION: ICD-10-CM

## 2023-05-16 DIAGNOSIS — I10 PRIMARY HYPERTENSION: ICD-10-CM

## 2023-05-16 DIAGNOSIS — I48.0 PAROXYSMAL ATRIAL FIBRILLATION: Primary | ICD-10-CM

## 2023-05-16 NOTE — PROGRESS NOTES
HP      Name: Gilberto Sanders ADMIT: (Not on file)   : 1950  PCP: Amarilys Bear MD    MRN: [id number] LOS: 0 days   AGE/SEX: 73 y.o. male  ROOM: Room/bed info not found     Chief Complaint   Patient presents with   • Consult     syncope       Subjective        History of present illness  Gilberto Sanders is a 73-year-old male patient who has history of coronary artery disease status post bypass in 2017, hypertension, dyslipidemia, is here today to discuss about heart rhythm management.  Patient wore a Holter monitor for syncope evaluation, it showed episodes of paroxysmal atrial fibrillation with rapid ventricular rates.  Patient was started on Xarelto as well as digoxin he had already been on metoprolol.      Past Medical History:   Diagnosis Date   • Coronary artery disease    • Hyperlipidemia    • Hypertension    • Myocardial infarction      Past Surgical History:   Procedure Laterality Date   • APPENDECTOMY     • CORONARY ANGIOPLASTY     • CORONARY ARTERY BYPASS GRAFT  08/30/2017    X10 Dr Kruger   • SKIN CANCER EXCISION       Family History   Problem Relation Age of Onset   • Heart disease Father      Social History     Tobacco Use   • Smoking status: Never   • Smokeless tobacco: Never   Vaping Use   • Vaping Use: Never used   Substance Use Topics   • Alcohol use: No   • Drug use: No     (Not in a hospital admission)    Allergies:  Patient has no known allergies.    Review of systems    Constitutional: Negative.    Respiratory and cardiovascular: As detailed in HPI section.  Gastrointestinal: Negative for constipation, nausea and vomiting negative for abdominal distention, abdominal pain and diarrhea.   Genitourinary: Negative for difficulty urinating and flank pain.   Musculoskeletal: Negative for arthralgias, joint swelling and myalgias.   Skin: Negative for color change, rash and wound.   Neurological: Negative for dizziness, syncope, weakness and headaches.   Hematological: Negative for  adenopathy.   Psychiatric/Behavioral: Negative for confusion.   All other systems reviewed and are negative.    Physical Exam  VITALS REVIEWED    General:      well developed, in no acute distress.    Head:      normocephalic and atraumatic.    Eyes:      PERRL/EOM intact, conjunctiva and sclera clear with out nystagmus.    Neck:      no masses, thyromegaly,  trachea central with normal respiratory effort and PMI displaced laterally  Lungs:      Clear to auscultation bilaterally  Heart:       Regular rate and rhythm  Msk:      no deformity or scoliosis noted of thoracic or lumbar spine.    Pulses:      pulses normal in all 4 extremities.    Extremities:       No lower extremity edema  Neurologic:      no focal deficits.   alert oriented x3  Skin:      intact without lesions or rashes.    Psych:      alert and cooperative; normal mood and affect; normal attention span and concentration.      Result Review :               Pertinent cardiac workup    1. Event monitor in April 2023 showed episodes of paroxysmal atrial fibrillation, possibly atrial flutter        ECG 12 Lead    Date/Time: 5/16/2023 11:25 AM  Performed by: Adrian Valentino MD  Authorized by: Adrian Valentino MD   Comparison: compared with previous ECG   Similar to previous ECG  Rhythm: sinus rhythm  Rate: normal  BPM: 67  Conduction: 1st degree AV block  ST Segments: ST segments normal  QRS axis: normal  Other findings: non-specific ST-T wave changes                Assessment and Plan      Gilberto Sanders is a 73-year-old male patient was history of coronary artery disease status post bypass, is here today to discuss about A-fib management options.  Patient was found to have atrial fibrillation on event monitor in April 2023 which was ordered due to a syncopal episode.  The syncopal episode itself I think is unrelated because it had features of orthostatic hypotension.  However he did develop rapid ventricular rates when in A-fib.  His baseline EKG  does show first-degree AV block and he has had bradycardia as well.  Therefore I do not think long-term antiarrhythmics alone would be a good idea without a pacemaker.  I would like to proceed with A-fib ablation to maintain sinus rhythm.  Risks indications and benefits were discussed at length with the patient who is agreeable.  Meanwhile continue same medications, I will give him a co-pay card and samples for Xarelto.      Diagnoses and all orders for this visit:    1. Status post coronary artery bypass graft (Primary)    2. Paroxysmal atrial fibrillation  Overview:  Added automatically from request for surgery [id number]      3. Primary hypertension           No follow-ups on file.  Patient was given instructions and counseling regarding his condition or for health maintenance advice. Please see specific information pulled into the AVS if appropriate.

## 2023-05-16 NOTE — LETTER
May 16, 2023     Morris Sanz MD  [address] [facility] [place] [address]    Patient: Gilberto Sanders   YOB: 1950   Date of Visit: 2023       Dear Dr. Yvon MD:    Thank you for referring Gilberto Sanders to me for evaluation. Below are the relevant portions of my assessment and plan of care.    If you have questions, please do not hesitate to call me. I look forward to following Gilberto along with you.         Sincerely,        Adrian Valentino MD        CC: No Recipients    Adrian Valentino MD  23 1130  Incomplete  HP      Name: Gilberto Sanders ADMIT: (Not on file)   : 1950  PCP: Amarilys Bear MD    MRN: [id number] LOS: 0 days   AGE/SEX: 73 y.o. male  ROOM: Room/bed info not found     Chief Complaint   Patient presents with   • Consult     syncope       Subjective         History of present illness  Gilberto Sanders is a 73-year-old male patient who has history of coronary artery disease status post bypass in 2017, hypertension, dyslipidemia, is here today to discuss about heart rhythm management.  Patient wore a Holter monitor for syncope evaluation, it showed episodes of paroxysmal atrial fibrillation with rapid ventricular rates.  Patient was started on Xarelto as well as digoxin he had already been on metoprolol.      Past Medical History:   Diagnosis Date   • Coronary artery disease    • Hyperlipidemia    • Hypertension    • Myocardial infarction      Past Surgical History:   Procedure Laterality Date   • APPENDECTOMY     • CORONARY ANGIOPLASTY     • CORONARY ARTERY BYPASS GRAFT  08/30/2017    X10 Dr Kruger   • SKIN CANCER EXCISION       Family History   Problem Relation Age of Onset   • Heart disease Father      Social History     Tobacco Use   • Smoking status: Never   • Smokeless tobacco: Never   Vaping Use   • Vaping Use: Never used   Substance Use Topics   • Alcohol use: No   • Drug use: No     (Not in a hospital admission)    Allergies:  Patient has no known  allergies.    Review of systems    Constitutional: Negative.    Respiratory and cardiovascular: As detailed in HPI section.  Gastrointestinal: Negative for constipation, nausea and vomiting negative for abdominal distention, abdominal pain and diarrhea.   Genitourinary: Negative for difficulty urinating and flank pain.   Musculoskeletal: Negative for arthralgias, joint swelling and myalgias.   Skin: Negative for color change, rash and wound.   Neurological: Negative for dizziness, syncope, weakness and headaches.   Hematological: Negative for adenopathy.   Psychiatric/Behavioral: Negative for confusion.   All other systems reviewed and are negative.    Physical Exam  VITALS REVIEWED    General:      well developed, in no acute distress.    Head:      normocephalic and atraumatic.    Eyes:      PERRL/EOM intact, conjunctiva and sclera clear with out nystagmus.    Neck:      no masses, thyromegaly,  trachea central with normal respiratory effort and PMI displaced laterally  Lungs:      Clear to auscultation bilaterally  Heart:       Regular rate and rhythm  Msk:      no deformity or scoliosis noted of thoracic or lumbar spine.    Pulses:      pulses normal in all 4 extremities.    Extremities:       No lower extremity edema  Neurologic:      no focal deficits.   alert oriented x3  Skin:      intact without lesions or rashes.    Psych:      alert and cooperative; normal mood and affect; normal attention span and concentration.      Result Review :               Pertinent cardiac workup    Event monitor in April 2023 showed episodes of paroxysmal atrial fibrillation, possibly atrial flutter        ECG 12 Lead    Date/Time: 5/16/2023 11:25 AM  Performed by: Adrian Valentino MD  Authorized by: Adrian Valentino MD   Comparison: compared with previous ECG   Similar to previous ECG  Rhythm: sinus rhythm  Rate: normal  BPM: 67  Conduction: 1st degree AV block  ST Segments: ST segments normal  QRS axis: normal  Other  findings: non-specific ST-T wave changes               Assessment and Plan      Gilberto Sanders is a 73-year-old male patient was history of coronary artery disease status post bypass, is here today to discuss about A-fib management options.  Patient was found to have atrial fibrillation on event monitor in April 2023 which was ordered due to a syncopal episode.  The syncopal episode itself I think is unrelated because it had features of orthostatic hypotension.  However he did develop rapid ventricular rates when in A-fib.  His baseline EKG does show first-degree AV block and he has had bradycardia as well.  Therefore I do not think long-term antiarrhythmics alone would be a good idea without a pacemaker.  I would like to proceed with A-fib ablation to maintain sinus rhythm.  Risks indications and benefits were discussed at length with the patient who is agreeable.  Meanwhile continue same medications, I will give him a co-pay card and samples for Xarelto.      Diagnoses and all orders for this visit:    1. Status post coronary artery bypass graft (Primary)    2. Paroxysmal atrial fibrillation  Overview:  Added automatically from request for surgery [id number]      3. Primary hypertension           No follow-ups on file.  Patient was given instructions and counseling regarding his condition or for health maintenance advice. Please see specific information pulled into the AVS if appropriate.

## 2023-05-30 ENCOUNTER — TELEPHONE (OUTPATIENT)
Dept: CARDIOLOGY | Facility: CLINIC | Age: 73
End: 2023-05-30

## 2023-05-30 NOTE — TELEPHONE ENCOUNTER
Patient was recently seen by Dr. Valentino who added the new medication and has plans for an outpatient atrial fibrillation ablation study it appears.  Please send this call to him to discuss the medication and side effects.  Thank you and let me know if the patient needs anything else

## 2023-05-30 NOTE — TELEPHONE ENCOUNTER
Caller: Gilberto Sanders    Relationship: Self    Best call back number:     What medications are you currently taking:   Current Outpatient Medications on File Prior to Visit   Medication Sig Dispense Refill   • allopurinol (ZYLOPRIM) 300 MG tablet Take 1 tablet by mouth Daily.     • aspirin 81 MG EC tablet   0   • atorvastatin (LIPITOR) 40 MG tablet TAKE ONE (1) TABLET BY MOUTH EVERY NIGHT AT BEDTIME  0   • digoxin (LANOXIN) 125 MCG tablet Take 1 tablet by mouth Daily. 90 tablet 3   • hydrALAZINE (APRESOLINE) 100 MG tablet Take 50 mg by mouth 2 (Two) Times a Day.  0   • metoprolol tartrate (LOPRESSOR) 25 MG tablet TAKE ONE-HALF (1/2) TABLET BY MOUTH TWICE DAILY  0   • rivaroxaban (Xarelto) 20 MG tablet Take 1 tablet by mouth Daily. 30 tablet 0     No current facility-administered medications on file prior to visit.          When did you start taking these medications: 4/2023    Which medication are you concerned about: DIGOXIN    Who prescribed you this medication: NEAL    What are your concerns: PT HAS BEEN FEELING DIZZY, WORN OUT, AND FATIGUE EVER SINCE HE NEW MEDICATIONS HAVE BEEN ADDED. PT IS UNSURE WHAT MAY BE CAUSING THIS, ITS CAUSING THE PT TO MISS OUT WORK DUE TO THE SIDE EFFECTS. PT IS UNABLE TO STAND AND WALK AROUND FOR LONG, MAYBE AROUND 15-20 MINS BEFORE NEEDING TO REST;.    How long have you had these concerns: 3-4 DAYS

## 2023-06-02 NOTE — TELEPHONE ENCOUNTER
Spoke to patient yesterday over the phone about symptoms and told him I would like to Dr. Sanz about neck steps.    These call and tell him Dr. Sanz would like him to restart digoxin and monitor his heart rate as he believes his symptoms could be because it is getting too fast.  Since he has been off digoxin since 5/30 we cannot check a level but if he restarts and continues to have symptoms after 5 to 7 days on medication we can check a level to make sure it is normal.  Please also let patient that if he continues to have symptoms Dr. Sanz would like him call and come to the office for an EKG.  Patient is scheduled for ablation with Dr. Valentino the end of this month.

## 2023-06-02 NOTE — TELEPHONE ENCOUNTER
Okay, that is fine I will let Dr. Sanz know.  Please have him stay off digoxin and continue taking his metoprolol as prescribed.  Let him know to call if he starts to feel dizzy and fatigued again and we will be happy to see him and/or get an EKG for further evaluation.

## 2023-06-02 NOTE — TELEPHONE ENCOUNTER
Called patient, he started back on his metoprolol and he feels better he would prefer not to go back on digoxin he stated he missed a week at work from taking it and feeling bad.

## 2023-07-10 ENCOUNTER — TELEPHONE (OUTPATIENT)
Dept: CARDIOLOGY | Facility: CLINIC | Age: 73
End: 2023-07-10

## 2023-07-10 NOTE — TELEPHONE ENCOUNTER
Spoke with patient. Encouraged to increase fluid intake and take extra time changing positions to minimize dizziness. Pt to continue to monitor BP. Will follow up with pt on Thursday.

## 2023-07-10 NOTE — TELEPHONE ENCOUNTER
Caller: Gilberto Sanders    Relationship to patient: Self    Best call back number: [phone]    Patient is needing: PATIENT SCHEDULED FOR HIS ONE MONTH FOLLOW UP FROM ABLATION. PATIENT STATED THAT HE HAS BEEN GETTING SHORT OF BREATH AND HAVING SOME DIZZINESS WHEN HE WALKS AROUND BUT WHEN HE SITS DOWN HE FEELS BETTER.

## 2023-07-13 ENCOUNTER — TELEPHONE (OUTPATIENT)
Dept: CARDIOLOGY | Facility: CLINIC | Age: 73
End: 2023-07-13

## 2023-07-13 NOTE — TELEPHONE ENCOUNTER
Spoke with patient. He is feeling much better. Patient to CTM BP/HR at home and call with any other issues.

## 2023-07-13 NOTE — TELEPHONE ENCOUNTER
Caller: Gilberto Sanders    Relationship: Self    Best call back number: [phone]      Who are you requesting to speak with (clinical staff, provider,  specific staff member): LORENA    Do you know the name of the person who called: LORENA    What was the call regardin/12/23  6:00 /80 HR 83    23  8:59 /66 HR 83    PULSE HAS BEEN RUNING AROUND 80-83. HE HAS BEEN EATING MORE AND DRINKING MORE WATER AND HAS NOT BEEN HAVING DIZZY SPELLS. PLEASE REACH OUT TO PATIENT FOR CONSULTATION.    Is it okay if the provider responds through MyChart: NO, MYCHART IS NOT SETUP

## 2023-07-19 ENCOUNTER — TELEPHONE (OUTPATIENT)
Dept: CARDIOLOGY | Facility: CLINIC | Age: 73
End: 2023-07-19

## 2023-07-19 RX ORDER — AMIODARONE HYDROCHLORIDE 200 MG/1
200 TABLET ORAL DAILY
Qty: 60 TABLET | Refills: 2
Start: 2023-07-19 | End: 2023-08-01

## 2023-07-19 NOTE — TELEPHONE ENCOUNTER
Spoke with patient, advised him to decrease amiodarone to 200 mg once a day.  We will see him in follow-up as scheduled.

## 2023-07-19 NOTE — TELEPHONE ENCOUNTER
Caller: Gilberto Sanders    Relationship: Self    Best call back number: [phone]    What is the best time to reach you: ANYTIME    Who are you requesting to speak with (clinical staff, provider,  specific staff member): CLINICAL    What was the call regarding: PT IS CALLING BACK TO LET LORENA MC KNOW THAT HE HAS STILL BEEN FEELING DIZZY AND PASSED OUT MONDAY AROUND 10PM. HE SAID HE'S OK WHEN HE'S SITTING DOWN BUT EVERYTIME HE STANDS OR WALKS HE FEELS DIZZY. PT SAID HE CHECKED HIS BLOOD PRESSURE TODAY AROUND 8AM AND IT /89 (78).    7.18.23  125/77 (76) AM  124/72 (75) 9PM    Is it okay if the provider responds through MyChart: NO

## 2023-08-01 ENCOUNTER — OFFICE VISIT (OUTPATIENT)
Dept: CARDIOLOGY | Facility: CLINIC | Age: 73
End: 2023-08-01
Payer: MEDICARE

## 2023-08-01 VITALS
SYSTOLIC BLOOD PRESSURE: 144 MMHG | HEIGHT: 67 IN | BODY MASS INDEX: 25.58 KG/M2 | OXYGEN SATURATION: 100 % | DIASTOLIC BLOOD PRESSURE: 87 MMHG | WEIGHT: 163 LBS | HEART RATE: 74 BPM

## 2023-08-01 DIAGNOSIS — I48.0 PAROXYSMAL ATRIAL FIBRILLATION: Primary | ICD-10-CM

## 2023-08-01 DIAGNOSIS — I10 PRIMARY HYPERTENSION: ICD-10-CM

## 2023-08-01 DIAGNOSIS — I49.5 SICK SINUS SYNDROME: ICD-10-CM

## 2023-08-01 DIAGNOSIS — I25.10 CORONARY ARTERY DISEASE INVOLVING NATIVE CORONARY ARTERY OF NATIVE HEART WITHOUT ANGINA PECTORIS: ICD-10-CM

## 2023-08-11 ENCOUNTER — TELEPHONE (OUTPATIENT)
Dept: CARDIOLOGY | Facility: CLINIC | Age: 73
End: 2023-08-11
Payer: MEDICARE

## 2023-08-11 NOTE — TELEPHONE ENCOUNTER
This is both Dr Sanz and Dr Valentino patient but patient is needing a return back to work and signed by Dr Valentino.    -Is patient cleared to go back to work?    EP/CRM Study (06/30/2023 10:17)     ECG 12 Lead (08/01/2023)

## 2023-08-11 NOTE — TELEPHONE ENCOUNTER
Okay, thank you!    -The note is not signed and it does have to physically be signed on the bottom    -is it possible for you to stop by the office today?

## 2023-08-11 NOTE — TELEPHONE ENCOUNTER
PATIENT CALLED AND STATED THAT HIS EMPLOYER TOLD HIM THE FAX NUMBER DOES NOT WORK ANYMORE. PATIENT HAS AN EMAIL ADDRESS OF [email or web address] AND HE WOULD LIKE THE WORK RELEASE EMAILED. HE HAS TO HAVE THIS IN ORDER TO RETURN TO WORK ON MONDAY.PATIENT CAN BE CONTACTED .[phone].

## 2023-08-11 NOTE — TELEPHONE ENCOUNTER
Caller: Gilberto Sanders    Relationship: Self    Best call back number: [phone]    What form or medical record are you requesting: RELEASE TO GO BACK TO WORK     Who is requesting this form or medical record from you: EMPLOYER    How would you like to receive the form or medical records (pick-up, mail, fax): FAX -[phone]    Timeframe paperwork needed: ASAP

## 2023-10-17 ENCOUNTER — OFFICE VISIT (OUTPATIENT)
Dept: CARDIOLOGY | Facility: CLINIC | Age: 73
End: 2023-10-17
Payer: MEDICARE

## 2023-10-17 VITALS
DIASTOLIC BLOOD PRESSURE: 82 MMHG | BODY MASS INDEX: 28.41 KG/M2 | WEIGHT: 181 LBS | SYSTOLIC BLOOD PRESSURE: 135 MMHG | HEART RATE: 90 BPM | OXYGEN SATURATION: 97 % | HEIGHT: 67 IN

## 2023-10-17 DIAGNOSIS — I48.0 PAROXYSMAL ATRIAL FIBRILLATION: Primary | ICD-10-CM

## 2023-10-17 DIAGNOSIS — I25.10 CORONARY ARTERY DISEASE INVOLVING NATIVE CORONARY ARTERY OF NATIVE HEART WITHOUT ANGINA PECTORIS: ICD-10-CM

## 2023-10-17 DIAGNOSIS — E78.00 PURE HYPERCHOLESTEROLEMIA: ICD-10-CM

## 2023-10-17 DIAGNOSIS — I10 PRIMARY HYPERTENSION: ICD-10-CM

## 2023-10-17 PROCEDURE — 3075F SYST BP GE 130 - 139MM HG: CPT | Performed by: INTERNAL MEDICINE

## 2023-10-17 PROCEDURE — 1160F RVW MEDS BY RX/DR IN RCRD: CPT | Performed by: INTERNAL MEDICINE

## 2023-10-17 PROCEDURE — 1159F MED LIST DOCD IN RCRD: CPT | Performed by: INTERNAL MEDICINE

## 2023-10-17 PROCEDURE — 3079F DIAST BP 80-89 MM HG: CPT | Performed by: INTERNAL MEDICINE

## 2023-10-17 PROCEDURE — 99214 OFFICE O/P EST MOD 30 MIN: CPT | Performed by: INTERNAL MEDICINE

## 2023-10-17 NOTE — PROGRESS NOTES
"    Subjective:     Encounter Date:10/17/2023      Patient ID: Gilberto Sanders is a 73 y.o. male.    Chief Complaint:  History of Present Illness 73-year-old white male with history of coronary status post coronary bypass surgery history of hypertension hyperlipidemia and paroxysmal fibrillation presents to my office for a follow-up.  Patient is currently stable without any symptoms of chest pain or shortness of breath at rest or exertion radiograms any PND orthopnea.  No palpitation dizziness syncope or swelling of the feet.  Patient is taking all her medicines regularly.  Patient does not smoke    The following portions of the patient's history were reviewed and updated as appropriate: allergies, current medications, past family history, past medical history, past social history, past surgical history, and problem list.  Past Medical History:   Diagnosis Date    A-fib 2023    Coronary artery disease     Hyperlipidemia     Hypertension     Multiple rib fractures 2023    from a fall early spring 2023, healed without intervention    Myocardial infarction     Skin cancer     back, chest, arms (lots of things burned off)     Past Surgical History:   Procedure Laterality Date    APPENDECTOMY      CARDIAC ELECTROPHYSIOLOGY PROCEDURE N/A 6/30/2023    Procedure: Ablation atrial fibrillation, RF, rep emailed;  Surgeon: Adrian Valentino MD;  Location: [facility] CATH INVASIVE LOCATION;  Service: Cardiovascular;  Laterality: N/A;    CORONARY ANGIOPLASTY      CORONARY ARTERY BYPASS GRAFT  08/30/2017    X10 Dr Kruger    SKIN CANCER EXCISION       /82   Pulse 90   Ht 170.2 cm (67\")   Wt 82.1 kg (181 lb)   SpO2 97%   BMI 28.35 kg/m²   Family History   Problem Relation Age of Onset    Heart disease Father        Current Outpatient Medications:     allopurinol (ZYLOPRIM) 300 MG tablet, Take 1 tablet by mouth Every Night., Disp: , Rfl:     aspirin 81 MG EC tablet, Take 1 tablet by mouth Every Night., Disp: , Rfl: 0    " atorvastatin (LIPITOR) 40 MG tablet, TAKE ONE (1) TABLET BY MOUTH EVERY NIGHT AT BEDTIME, Disp: , Rfl: 0    hydrALAZINE (APRESOLINE) 100 MG tablet, Take 0.5 tablets by mouth 2 (Two) Times a Day., Disp: , Rfl: 0    rivaroxaban (Xarelto) 20 MG tablet, Take 1 tablet by mouth Daily., Disp: 30 tablet, Rfl: 0  No Known Allergies  Social History     Socioeconomic History    Marital status:    Tobacco Use    Smoking status: Never     Passive exposure: Never    Smokeless tobacco: Never   Vaping Use    Vaping Use: Never used   Substance and Sexual Activity    Alcohol use: No    Drug use: No    Sexual activity: Defer     Review of Systems   Constitutional: Negative for malaise/fatigue.   Cardiovascular:  Negative for chest pain, dyspnea on exertion, leg swelling and palpitations.   Respiratory:  Negative for cough and shortness of breath.    Gastrointestinal:  Negative for abdominal pain, nausea and vomiting.   Neurological:  Negative for dizziness, focal weakness, headaches, light-headedness and numbness.   All other systems reviewed and are negative.             Objective:     Constitutional:       Appearance: Well-developed.   Eyes:      General: No scleral icterus.     Conjunctiva/sclera: Conjunctivae normal.   HENT:      Head: Normocephalic and atraumatic.   Neck:      Vascular: No carotid bruit or JVD.   Pulmonary:      Effort: Pulmonary effort is normal.      Breath sounds: Normal breath sounds. No wheezing. No rales.   Cardiovascular:      Normal rate. Regular rhythm.   Pulses:     Intact distal pulses.   Abdominal:      General: Bowel sounds are normal.      Palpations: Abdomen is soft.   Musculoskeletal:      Cervical back: Normal range of motion and neck supple. Skin:     General: Skin is warm and dry.      Findings: No rash.   Neurological:      Mental Status: Alert.       Procedures    Lab Review:         MDM    #1 coronary disease  Patient had coronary bypass surgery x10 vessels and is currently stable  without any symptoms of chest pain and has normal LV function  2.  Hypertension  Patient blood pressure currently stable on hydralazine  3.  Hyperlipidemia  Patient is on Lipitor and the lipid levels are well within normal limits  4.  Paroxysmal fibrillation  Patient has history of paroxysmal fibrillation and is currently prescribed Xarelto but he is awaiting his GI evaluation for low hemoglobin and then will start it but he can stop his aspirin.    Patient's previous medical records, labs, and EKG were reviewed and discussed with the patient at today's visit.

## 2023-11-27 ENCOUNTER — OFFICE (AMBULATORY)
Dept: URBAN - METROPOLITAN AREA CLINIC 64 | Facility: CLINIC | Age: 73
End: 2023-11-27
Payer: COMMERCIAL

## 2023-11-27 VITALS
WEIGHT: 183 LBS | HEART RATE: 92 BPM | HEIGHT: 68 IN | SYSTOLIC BLOOD PRESSURE: 142 MMHG | DIASTOLIC BLOOD PRESSURE: 85 MMHG

## 2023-11-27 DIAGNOSIS — Z86.010 PERSONAL HISTORY OF COLONIC POLYPS: ICD-10-CM

## 2023-11-27 DIAGNOSIS — D50.0 IRON DEFICIENCY ANEMIA SECONDARY TO BLOOD LOSS (CHRONIC): ICD-10-CM

## 2023-11-27 PROCEDURE — 99204 OFFICE O/P NEW MOD 45 MIN: CPT | Performed by: INTERNAL MEDICINE

## 2024-01-01 ENCOUNTER — HOSPITAL ENCOUNTER (INPATIENT)
Facility: HOSPITAL | Age: 74
LOS: 5 days | Discharge: HOME | DRG: 202 | End: 2024-01-06
Attending: EMERGENCY MEDICINE | Admitting: FAMILY MEDICINE
Payer: MEDICARE

## 2024-01-01 ENCOUNTER — APPOINTMENT (OUTPATIENT)
Dept: GENERAL RADIOLOGY | Facility: HOSPITAL | Age: 74
DRG: 202 | End: 2024-01-01
Payer: MEDICARE

## 2024-01-01 DIAGNOSIS — N28.9 RENAL INSUFFICIENCY: ICD-10-CM

## 2024-01-01 DIAGNOSIS — B33.8 RSV INFECTION: ICD-10-CM

## 2024-01-01 DIAGNOSIS — R09.02 HYPOXIA: Primary | ICD-10-CM

## 2024-01-01 DIAGNOSIS — R26.2 DIFFICULTY WALKING: ICD-10-CM

## 2024-01-01 PROBLEM — J96.01 ACUTE RESPIRATORY FAILURE WITH HYPOXIA: Status: ACTIVE | Noted: 2024-01-01

## 2024-01-01 LAB
ALBUMIN SERPL-MCNC: 3.6 G/DL (ref 3.5–5.2)
ALBUMIN/GLOB SERPL: 0.8 G/DL
ALP SERPL-CCNC: 128 U/L (ref 39–117)
ALT SERPL W P-5'-P-CCNC: 156 U/L (ref 1–41)
ANION GAP SERPL CALCULATED.3IONS-SCNC: 14.9 MMOL/L (ref 5–15)
AST SERPL-CCNC: 163 U/L (ref 1–40)
BASOPHILS # BLD AUTO: 0.08 10*3/MM3 (ref 0–0.2)
BASOPHILS NFR BLD AUTO: 0.6 % (ref 0–1.5)
BILIRUB SERPL-MCNC: 0.8 MG/DL (ref 0–1.2)
BUN SERPL-MCNC: 51 MG/DL (ref 8–23)
BUN/CREAT SERPL: 26 (ref 7–25)
CALCIUM SPEC-SCNC: 10.2 MG/DL (ref 8.6–10.5)
CHLORIDE SERPL-SCNC: 102 MMOL/L (ref 98–107)
CO2 SERPL-SCNC: 20.1 MMOL/L (ref 22–29)
CREAT SERPL-MCNC: 1.96 MG/DL (ref 0.76–1.27)
DEPRECATED RDW RBC AUTO: 51.1 FL (ref 37–54)
EGFRCR SERPLBLD CKD-EPI 2021: 35.4 ML/MIN/1.73
EOSINOPHIL # BLD AUTO: 0.04 10*3/MM3 (ref 0–0.4)
EOSINOPHIL NFR BLD AUTO: 0.3 % (ref 0.3–6.2)
ERYTHROCYTE [DISTWIDTH] IN BLOOD BY AUTOMATED COUNT: 15.4 % (ref 12.3–15.4)
FLUAV SUBTYP SPEC NAA+PROBE: NOT DETECTED
FLUBV RNA ISLT QL NAA+PROBE: NOT DETECTED
GEN 5 2HR TROPONIN T REFLEX: 65 NG/L
GLOBULIN UR ELPH-MCNC: 4.6 GM/DL
GLUCOSE SERPL-MCNC: 124 MG/DL (ref 65–99)
HCT VFR BLD AUTO: 31.8 % (ref 37.5–51)
HGB BLD-MCNC: 10.1 G/DL (ref 13–17.7)
HOLD SPECIMEN: NORMAL
HOLD SPECIMEN: NORMAL
IMM GRANULOCYTES # BLD AUTO: 0.2 10*3/MM3 (ref 0–0.05)
IMM GRANULOCYTES NFR BLD AUTO: 1.5 % (ref 0–0.5)
LYMPHOCYTES # BLD AUTO: 1.26 10*3/MM3 (ref 0.7–3.1)
LYMPHOCYTES NFR BLD AUTO: 9.6 % (ref 19.6–45.3)
MCH RBC QN AUTO: 28.9 PG (ref 26.6–33)
MCHC RBC AUTO-ENTMCNC: 31.8 G/DL (ref 31.5–35.7)
MCV RBC AUTO: 91.1 FL (ref 79–97)
MONOCYTES # BLD AUTO: 0.83 10*3/MM3 (ref 0.1–0.9)
MONOCYTES NFR BLD AUTO: 6.3 % (ref 5–12)
NEUTROPHILS NFR BLD AUTO: 10.68 10*3/MM3 (ref 1.7–7)
NEUTROPHILS NFR BLD AUTO: 81.7 % (ref 42.7–76)
NRBC BLD AUTO-RTO: 0 /100 WBC (ref 0–0.2)
NT-PROBNP SERPL-MCNC: 1204 PG/ML (ref 0–900)
PLATELET # BLD AUTO: 419 10*3/MM3 (ref 140–450)
PMV BLD AUTO: 10.5 FL (ref 6–12)
POTASSIUM SERPL-SCNC: 4.7 MMOL/L (ref 3.5–5.2)
PROT SERPL-MCNC: 8.2 G/DL (ref 6–8.5)
QT INTERVAL: 376 MS
QTC INTERVAL: 453 MS
RBC # BLD AUTO: 3.49 10*6/MM3 (ref 4.14–5.8)
RSV RNA NPH QL NAA+NON-PROBE: DETECTED
SARS-COV-2 RNA RESP QL NAA+PROBE: NOT DETECTED
SODIUM SERPL-SCNC: 137 MMOL/L (ref 136–145)
TROPONIN T DELTA: -5 NG/L
TROPONIN T SERPL HS-MCNC: 70 NG/L
WBC NRBC COR # BLD AUTO: 13.09 10*3/MM3 (ref 3.4–10.8)
WHOLE BLOOD HOLD COAG: NORMAL
WHOLE BLOOD HOLD SPECIMEN: NORMAL

## 2024-01-01 PROCEDURE — 93005 ELECTROCARDIOGRAM TRACING: CPT

## 2024-01-01 PROCEDURE — 83880 ASSAY OF NATRIURETIC PEPTIDE: CPT

## 2024-01-01 PROCEDURE — 82746 ASSAY OF FOLIC ACID SERUM: CPT | Performed by: INTERNAL MEDICINE

## 2024-01-01 PROCEDURE — 85025 COMPLETE CBC W/AUTO DIFF WBC: CPT

## 2024-01-01 PROCEDURE — 84484 ASSAY OF TROPONIN QUANT: CPT

## 2024-01-01 PROCEDURE — 99285 EMERGENCY DEPT VISIT HI MDM: CPT

## 2024-01-01 PROCEDURE — 71045 X-RAY EXAM CHEST 1 VIEW: CPT

## 2024-01-01 PROCEDURE — 36415 COLL VENOUS BLD VENIPUNCTURE: CPT

## 2024-01-01 PROCEDURE — 80053 COMPREHEN METABOLIC PANEL: CPT

## 2024-01-01 PROCEDURE — 82607 VITAMIN B-12: CPT | Performed by: INTERNAL MEDICINE

## 2024-01-01 PROCEDURE — 87637 SARSCOV2&INF A&B&RSV AMP PRB: CPT

## 2024-01-01 RX ORDER — SODIUM CHLORIDE 0.9 % (FLUSH) 0.9 %
10 SYRINGE (ML) INJECTION AS NEEDED
Status: DISCONTINUED | OUTPATIENT
Start: 2024-01-01 | End: 2024-01-06 | Stop reason: HOSPADM

## 2024-01-02 ENCOUNTER — APPOINTMENT (OUTPATIENT)
Dept: CARDIOLOGY | Facility: HOSPITAL | Age: 74
DRG: 202 | End: 2024-01-02
Payer: MEDICARE

## 2024-01-02 LAB
ALBUMIN SERPL-MCNC: 3.3 G/DL (ref 3.5–5.2)
ALBUMIN/GLOB SERPL: 0.8 G/DL
ALP SERPL-CCNC: 123 U/L (ref 39–117)
ALT SERPL W P-5'-P-CCNC: 159 U/L (ref 1–41)
ANION GAP SERPL CALCULATED.3IONS-SCNC: 13.1 MMOL/L (ref 5–15)
AST SERPL-CCNC: 168 U/L (ref 1–40)
BASOPHILS # BLD AUTO: 0.07 10*3/MM3 (ref 0–0.2)
BASOPHILS NFR BLD AUTO: 0.6 % (ref 0–1.5)
BH CV ECHO MEAS - AO MAX PG: 9.1 MMHG
BH CV ECHO MEAS - AO ROOT DIAM: 4 CM
BH CV ECHO MEAS - AO V2 MAX: 151 CM/SEC
BH CV ECHO MEAS - EDV(CUBED): 224.8 ML
BH CV ECHO MEAS - EDV(MOD-SP2): 134 ML
BH CV ECHO MEAS - EDV(MOD-SP4): 153 ML
BH CV ECHO MEAS - EF(MOD-BP): 46.5 %
BH CV ECHO MEAS - EF(MOD-SP2): 47.3 %
BH CV ECHO MEAS - EF(MOD-SP4): 48.1 %
BH CV ECHO MEAS - ESV(CUBED): 79 ML
BH CV ECHO MEAS - ESV(MOD-SP2): 70.6 ML
BH CV ECHO MEAS - ESV(MOD-SP4): 79.4 ML
BH CV ECHO MEAS - FS: 29.4 %
BH CV ECHO MEAS - IVS/LVPW: 1.04 CM
BH CV ECHO MEAS - IVSD: 1.2 CM
BH CV ECHO MEAS - LA DIMENSION: 3.8 CM
BH CV ECHO MEAS - LAT PEAK E' VEL: 9.3 CM/SEC
BH CV ECHO MEAS - LV DIASTOLIC VOL/BSA (35-75): 81.4 CM2
BH CV ECHO MEAS - LV MASS(C)D: 312 GRAMS
BH CV ECHO MEAS - LV MAX PG: 3.3 MMHG
BH CV ECHO MEAS - LV MEAN PG: 5 MMHG
BH CV ECHO MEAS - LV SYSTOLIC VOL/BSA (12-30): 42.3 CM2
BH CV ECHO MEAS - LV V1 MAX: 90.4 CM/SEC
BH CV ECHO MEAS - LV V1 VTI: 32.3 CM
BH CV ECHO MEAS - LVIDD: 6.1 CM
BH CV ECHO MEAS - LVIDS: 4.3 CM
BH CV ECHO MEAS - LVOT AREA: 3.8 CM2
BH CV ECHO MEAS - LVOT DIAM: 2.2 CM
BH CV ECHO MEAS - LVPWD: 1.15 CM
BH CV ECHO MEAS - MED PEAK E' VEL: 4 CM/SEC
BH CV ECHO MEAS - MV A MAX VEL: 101 CM/SEC
BH CV ECHO MEAS - MV DEC TIME: 0.2 SEC
BH CV ECHO MEAS - MV E MAX VEL: 67.7 CM/SEC
BH CV ECHO MEAS - MV E/A: 0.67
BH CV ECHO MEAS - MV MEAN PG: 2 MMHG
BH CV ECHO MEAS - MV V2 VTI: 30.1 CM
BH CV ECHO MEAS - MVA(VTI): 4.1 CM2
BH CV ECHO MEAS - PA V2 MAX: 106 CM/SEC
BH CV ECHO MEAS - PI END-D VEL: 148 CM/SEC
BH CV ECHO MEAS - SI(MOD-SP2): 33.7 ML/M2
BH CV ECHO MEAS - SI(MOD-SP4): 39.2 ML/M2
BH CV ECHO MEAS - SV(LVOT): 122.8 ML
BH CV ECHO MEAS - SV(MOD-SP2): 63.4 ML
BH CV ECHO MEAS - SV(MOD-SP4): 73.6 ML
BH CV ECHO MEAS - TAPSE (>1.6): 0.71 CM
BH CV ECHO MEAS - TR MAX PG: 23 MMHG
BH CV ECHO MEAS - TR MAX VEL: 240 CM/SEC
BH CV ECHO MEASUREMENTS AVERAGE E/E' RATIO: 10.18
BH CV XLRA - RV BASE: 3.1 CM
BH CV XLRA - TDI S': 7.9 CM/SEC
BILIRUB SERPL-MCNC: 0.7 MG/DL (ref 0–1.2)
BUN SERPL-MCNC: 58 MG/DL (ref 8–23)
BUN/CREAT SERPL: 28.7 (ref 7–25)
CALCIUM SPEC-SCNC: 9.7 MG/DL (ref 8.6–10.5)
CHLORIDE SERPL-SCNC: 102 MMOL/L (ref 98–107)
CO2 SERPL-SCNC: 19.9 MMOL/L (ref 22–29)
CREAT SERPL-MCNC: 2.02 MG/DL (ref 0.76–1.27)
DEPRECATED RDW RBC AUTO: 51.8 FL (ref 37–54)
EGFRCR SERPLBLD CKD-EPI 2021: 34.2 ML/MIN/1.73
EOSINOPHIL # BLD AUTO: 0.03 10*3/MM3 (ref 0–0.4)
EOSINOPHIL NFR BLD AUTO: 0.2 % (ref 0.3–6.2)
ERYTHROCYTE [DISTWIDTH] IN BLOOD BY AUTOMATED COUNT: 15.6 % (ref 12.3–15.4)
GLOBULIN UR ELPH-MCNC: 4.2 GM/DL
GLUCOSE SERPL-MCNC: 122 MG/DL (ref 65–99)
HCT VFR BLD AUTO: 30.7 % (ref 37.5–51)
HGB BLD-MCNC: 9.8 G/DL (ref 13–17.7)
IMM GRANULOCYTES # BLD AUTO: 0.2 10*3/MM3 (ref 0–0.05)
IMM GRANULOCYTES NFR BLD AUTO: 1.6 % (ref 0–0.5)
IVRT: 158 MS
LEFT ATRIUM VOLUME INDEX: 32.7 ML/M2
LYMPHOCYTES # BLD AUTO: 0.91 10*3/MM3 (ref 0.7–3.1)
LYMPHOCYTES NFR BLD AUTO: 7.5 % (ref 19.6–45.3)
MCH RBC QN AUTO: 29.1 PG (ref 26.6–33)
MCHC RBC AUTO-ENTMCNC: 31.9 G/DL (ref 31.5–35.7)
MCV RBC AUTO: 91.1 FL (ref 79–97)
MONOCYTES # BLD AUTO: 0.46 10*3/MM3 (ref 0.1–0.9)
MONOCYTES NFR BLD AUTO: 3.8 % (ref 5–12)
NEUTROPHILS NFR BLD AUTO: 10.46 10*3/MM3 (ref 1.7–7)
NEUTROPHILS NFR BLD AUTO: 86.3 % (ref 42.7–76)
NRBC BLD AUTO-RTO: 0 /100 WBC (ref 0–0.2)
PLATELET # BLD AUTO: 397 10*3/MM3 (ref 140–450)
PMV BLD AUTO: 10.7 FL (ref 6–12)
POTASSIUM SERPL-SCNC: 4.4 MMOL/L (ref 3.5–5.2)
PROT SERPL-MCNC: 7.5 G/DL (ref 6–8.5)
RBC # BLD AUTO: 3.37 10*6/MM3 (ref 4.14–5.8)
SODIUM SERPL-SCNC: 135 MMOL/L (ref 136–145)
TROPONIN T SERPL HS-MCNC: 67 NG/L
WBC NRBC COR # BLD AUTO: 12.13 10*3/MM3 (ref 3.4–10.8)

## 2024-01-02 PROCEDURE — 97161 PT EVAL LOW COMPLEX 20 MIN: CPT

## 2024-01-02 PROCEDURE — 25010000002 HEPARIN (PORCINE) PER 1000 UNITS: Performed by: FAMILY MEDICINE

## 2024-01-02 PROCEDURE — 94799 UNLISTED PULMONARY SVC/PX: CPT

## 2024-01-02 PROCEDURE — 85025 COMPLETE CBC W/AUTO DIFF WBC: CPT | Performed by: FAMILY MEDICINE

## 2024-01-02 PROCEDURE — 93356 MYOCRD STRAIN IMG SPCKL TRCK: CPT

## 2024-01-02 PROCEDURE — 99222 1ST HOSP IP/OBS MODERATE 55: CPT | Performed by: FAMILY MEDICINE

## 2024-01-02 PROCEDURE — 25810000003 SODIUM CHLORIDE 0.9 % SOLUTION: Performed by: FAMILY MEDICINE

## 2024-01-02 PROCEDURE — 80053 COMPREHEN METABOLIC PANEL: CPT | Performed by: FAMILY MEDICINE

## 2024-01-02 PROCEDURE — 25010000002 METHYLPREDNISOLONE PER 125 MG: Performed by: FAMILY MEDICINE

## 2024-01-02 PROCEDURE — 94640 AIRWAY INHALATION TREATMENT: CPT

## 2024-01-02 PROCEDURE — 84484 ASSAY OF TROPONIN QUANT: CPT | Performed by: FAMILY MEDICINE

## 2024-01-02 PROCEDURE — 93306 TTE W/DOPPLER COMPLETE: CPT

## 2024-01-02 RX ORDER — SODIUM CHLORIDE 0.9 % (FLUSH) 0.9 %
10 SYRINGE (ML) INJECTION EVERY 12 HOURS SCHEDULED
Status: DISCONTINUED | OUTPATIENT
Start: 2024-01-02 | End: 2024-01-06 | Stop reason: HOSPADM

## 2024-01-02 RX ORDER — SODIUM CHLORIDE 9 MG/ML
40 INJECTION, SOLUTION INTRAVENOUS AS NEEDED
Status: DISCONTINUED | OUTPATIENT
Start: 2024-01-02 | End: 2024-01-06 | Stop reason: HOSPADM

## 2024-01-02 RX ORDER — ONDANSETRON 2 MG/ML
4 INJECTION INTRAMUSCULAR; INTRAVENOUS EVERY 6 HOURS PRN
Status: DISCONTINUED | OUTPATIENT
Start: 2024-01-02 | End: 2024-01-06 | Stop reason: HOSPADM

## 2024-01-02 RX ORDER — BENZONATATE 100 MG/1
100 CAPSULE ORAL 3 TIMES DAILY PRN
Status: DISCONTINUED | OUTPATIENT
Start: 2024-01-02 | End: 2024-01-06 | Stop reason: HOSPADM

## 2024-01-02 RX ORDER — SODIUM CHLORIDE 0.9 % (FLUSH) 0.9 %
10 SYRINGE (ML) INJECTION AS NEEDED
Status: DISCONTINUED | OUTPATIENT
Start: 2024-01-02 | End: 2024-01-06 | Stop reason: HOSPADM

## 2024-01-02 RX ORDER — NITROGLYCERIN 0.4 MG/1
0.4 TABLET SUBLINGUAL
Status: DISCONTINUED | OUTPATIENT
Start: 2024-01-02 | End: 2024-01-06 | Stop reason: HOSPADM

## 2024-01-02 RX ORDER — BISACODYL 5 MG/1
5 TABLET, DELAYED RELEASE ORAL DAILY PRN
Status: DISCONTINUED | OUTPATIENT
Start: 2024-01-02 | End: 2024-01-06 | Stop reason: HOSPADM

## 2024-01-02 RX ORDER — ONDANSETRON 4 MG/1
4 TABLET, ORALLY DISINTEGRATING ORAL EVERY 6 HOURS PRN
Status: DISCONTINUED | OUTPATIENT
Start: 2024-01-02 | End: 2024-01-06 | Stop reason: HOSPADM

## 2024-01-02 RX ORDER — METHYLPREDNISOLONE SODIUM SUCCINATE 125 MG/2ML
60 INJECTION, POWDER, LYOPHILIZED, FOR SOLUTION INTRAMUSCULAR; INTRAVENOUS EVERY 6 HOURS
Status: DISCONTINUED | OUTPATIENT
Start: 2024-01-02 | End: 2024-01-03

## 2024-01-02 RX ORDER — GUAIFENESIN 600 MG/1
1200 TABLET, EXTENDED RELEASE ORAL EVERY 12 HOURS SCHEDULED
Status: DISCONTINUED | OUTPATIENT
Start: 2024-01-02 | End: 2024-01-06 | Stop reason: HOSPADM

## 2024-01-02 RX ORDER — POLYETHYLENE GLYCOL 3350 17 G/17G
17 POWDER, FOR SOLUTION ORAL DAILY PRN
Status: DISCONTINUED | OUTPATIENT
Start: 2024-01-02 | End: 2024-01-06 | Stop reason: HOSPADM

## 2024-01-02 RX ORDER — IPRATROPIUM BROMIDE AND ALBUTEROL SULFATE 2.5; .5 MG/3ML; MG/3ML
3 SOLUTION RESPIRATORY (INHALATION)
Status: DISCONTINUED | OUTPATIENT
Start: 2024-01-02 | End: 2024-01-06 | Stop reason: HOSPADM

## 2024-01-02 RX ORDER — IPRATROPIUM BROMIDE AND ALBUTEROL SULFATE 2.5; .5 MG/3ML; MG/3ML
3 SOLUTION RESPIRATORY (INHALATION)
Status: DISCONTINUED | OUTPATIENT
Start: 2024-01-02 | End: 2024-01-02

## 2024-01-02 RX ORDER — HEPARIN SODIUM 5000 [USP'U]/ML
5000 INJECTION, SOLUTION INTRAVENOUS; SUBCUTANEOUS EVERY 8 HOURS SCHEDULED
Status: DISCONTINUED | OUTPATIENT
Start: 2024-01-02 | End: 2024-01-06 | Stop reason: HOSPADM

## 2024-01-02 RX ORDER — AMOXICILLIN 250 MG
2 CAPSULE ORAL 2 TIMES DAILY
Status: DISCONTINUED | OUTPATIENT
Start: 2024-01-02 | End: 2024-01-06 | Stop reason: HOSPADM

## 2024-01-02 RX ORDER — SODIUM CHLORIDE 9 MG/ML
75 INJECTION, SOLUTION INTRAVENOUS CONTINUOUS
Status: DISCONTINUED | OUTPATIENT
Start: 2024-01-02 | End: 2024-01-04

## 2024-01-02 RX ORDER — BISACODYL 10 MG
10 SUPPOSITORY, RECTAL RECTAL DAILY PRN
Status: DISCONTINUED | OUTPATIENT
Start: 2024-01-02 | End: 2024-01-06 | Stop reason: HOSPADM

## 2024-01-02 RX ADMIN — IPRATROPIUM BROMIDE AND ALBUTEROL SULFATE 3 ML: 2.5; .5 SOLUTION RESPIRATORY (INHALATION) at 19:11

## 2024-01-02 RX ADMIN — METHYLPREDNISOLONE SODIUM SUCCINATE 60 MG: 125 INJECTION INTRAMUSCULAR; INTRAVENOUS at 14:05

## 2024-01-02 RX ADMIN — SODIUM CHLORIDE 75 ML/HR: 9 INJECTION, SOLUTION INTRAVENOUS at 14:09

## 2024-01-02 RX ADMIN — Medication 10 ML: at 09:20

## 2024-01-02 RX ADMIN — HEPARIN SODIUM 5000 UNITS: 5000 INJECTION INTRAVENOUS; SUBCUTANEOUS at 14:05

## 2024-01-02 RX ADMIN — METHYLPREDNISOLONE SODIUM SUCCINATE 60 MG: 125 INJECTION INTRAMUSCULAR; INTRAVENOUS at 02:00

## 2024-01-02 RX ADMIN — METHYLPREDNISOLONE SODIUM SUCCINATE 60 MG: 125 INJECTION INTRAMUSCULAR; INTRAVENOUS at 21:31

## 2024-01-02 RX ADMIN — GUAIFENESIN 1200 MG: 600 TABLET ORAL at 04:14

## 2024-01-02 RX ADMIN — HEPARIN SODIUM 5000 UNITS: 5000 INJECTION INTRAVENOUS; SUBCUTANEOUS at 21:31

## 2024-01-02 RX ADMIN — METHYLPREDNISOLONE SODIUM SUCCINATE 60 MG: 125 INJECTION INTRAMUSCULAR; INTRAVENOUS at 09:20

## 2024-01-02 RX ADMIN — BENZONATATE 100 MG: 100 CAPSULE ORAL at 04:15

## 2024-01-02 RX ADMIN — IPRATROPIUM BROMIDE AND ALBUTEROL SULFATE 3 ML: 2.5; .5 SOLUTION RESPIRATORY (INHALATION) at 12:42

## 2024-01-02 RX ADMIN — HEPARIN SODIUM 5000 UNITS: 5000 INJECTION INTRAVENOUS; SUBCUTANEOUS at 05:20

## 2024-01-02 RX ADMIN — IPRATROPIUM BROMIDE AND ALBUTEROL SULFATE 3 ML: 2.5; .5 SOLUTION RESPIRATORY (INHALATION) at 22:48

## 2024-01-02 RX ADMIN — GUAIFENESIN 1200 MG: 600 TABLET ORAL at 21:31

## 2024-01-02 RX ADMIN — IPRATROPIUM BROMIDE AND ALBUTEROL SULFATE 3 ML: 2.5; .5 SOLUTION RESPIRATORY (INHALATION) at 04:43

## 2024-01-02 RX ADMIN — SODIUM CHLORIDE 75 ML/HR: 9 INJECTION, SOLUTION INTRAVENOUS at 02:00

## 2024-01-02 NOTE — H&P
[facility]   HISTORY AND PHYSICAL    Patient Name: Gilberto Sanders  : 1950  MRN: [id number]  Primary Care Physician:  Amarilys Bear MD  Date of admission: 2024    Subjective   Subjective     Chief Complaint:   Shortness of breath  History of Present Illness  Patient is a 73-year-old male with past medical history of coronary artery disease status post CABG, atrial fibrillation, hyperlipidemia, hypertension who presented to the emergency department complaining of a 1 week history of increasing shortness of breath with some cough and chest congestion.  Patient says that he was concerned that he had pneumonia as he is so short of breath he has had difficulty taking care of himself.  He been extremely weak but denies chest pain or palpitations.  Patient said that it got so bad he called EMS to bring him into the emergency department.  Here in the ED he was found to be RSV positive.  His imaging does not show an acute infiltrate however on physical exam the patient was noted to have fairly diffuse wheezes and rhonchi on auscultation.  He was also slightly hypoxic in the emergency department especially with exertion.  He denies history of COPD.  His laboratory data has shown slightly elevated troponin however with a negative delta.  He also appears to be in acute renal failure which could be contributing to the elevated troponin.  His BNP is also slightly elevated at 1204.  The patient will be admitted to the hospitalist program  Review of Systems     Personal History     Past Medical History:   Diagnosis Date    A-2023    Coronary artery disease     Hyperlipidemia     Hypertension     Multiple rib fractures     from a fall early spring 2023, healed without intervention    Myocardial infarction     Skin cancer     back, chest, arms (lots of things burned off)       Past Surgical History:   Procedure Laterality Date    APPENDECTOMY      CARDIAC ELECTROPHYSIOLOGY PROCEDURE N/A 2023     Procedure: Ablation atrial fibrillation, RF, rep emailed;  Surgeon: Adrian Valentino MD;  Location: [facility] CATH INVASIVE LOCATION;  Service: Cardiovascular;  Laterality: N/A;    CORONARY ANGIOPLASTY      CORONARY ARTERY BYPASS GRAFT  08/30/2017    X10 Dr Kruger    SKIN CANCER EXCISION         Family History: family history includes Heart disease in his father. Otherwise pertinent FHx was reviewed and not pertinent to current issue.    Social History:  reports that he has never smoked. He has never been exposed to tobacco smoke. He has never used smokeless tobacco. He reports that he does not drink alcohol and does not use drugs.    Home Medications:  allopurinol, aspirin, atorvastatin, hydrALAZINE, and rivaroxaban    Allergies:  No Known Allergies    Objective    Objective     Vitals:   Temp:  [97.8 °F (36.6 °C)-97.9 °F (36.6 °C)] 97.9 °F (36.6 °C)  Heart Rate:  [84-95] 87  Resp:  [18-20] 20  BP: (122-154)/(61-96) 154/88    Physical Exam  Constitutional:  Well-developed and well-nourished.  No acute distress.  The patient does not appear toxic but he does appear acutely ill     HENT:  Head:  Normocephalic and atraumatic.  Mouth:  Moist mucous membranes.    Eyes:  Conjunctivae and EOM are normal. No scleral icterus.    Neck:  Neck supple.  No JVD present.    Cardiovascular:  Normal rate, regular rhythm and normal heart sounds with no murmur.  Pulmonary/Chest:  No respiratory distress decreased breath sounds bilaterally with scattered wheezes in the bases bilaterally and some rhonchi scattered throughout all lung zones.  There is partial clearance of the rhonchi after cough.  Abdominal:  Soft. No distension and no tenderness.   Musculoskeletal:  No tenderness, and no deformity.  No red or swollen joints anywhere.    Neurological:  Alert and oriented to person, place, and time.  No cranial nerve deficit.    Skin:  Skin is warm and dry. No rash noted. No pallor.   Peripheral vascular:  No clubbing, no cyanosis, no  edema.  Psychiatric: Appropriate mood and affect  : Deferred  Result Review    Result Review:  I have personally reviewed the results from the time of this admission to 1/2/2024 02:10 EST and agree with these findings:  [x]  Laboratory list / accordion  []  Microbiology  [x]  Radiology  []  EKG/Telemetry   []  Cardiology/Vascular   []  Pathology  []  Old records  []  Other:  Most notable findings include:       Assessment & Plan   Assessment / Plan     Brief Patient Summary:  Gilberto Sanders is a 73 y.o. male who presented to the emergency department with shortness of breath cough chest congestion.  He was found to have RSV bronchiolitis and will be admitted for further evaluation and treatment  Active Hospital Problems:  1.  Acute hypoxic respiratory failure  2.  RSV bronchiolitis  3.  Acute renal failure  4.  Elevated BNP without signs of obvious heart failure  5.  CAD status post CABG  Plan:   Patient will be admitted to the hospital service  At this point our treatment will most likely just be supportive.  We will support with O2 via nasal cannula  Started duo nebs every 6 hours  Will start Solu-Medrol 60 mg IV push every 8 hours  Will gently hydrate with normal saline at 100 cc/h for 2 L  Given the elevated BNP we will go ahead and obtain a 2D echo and based on the findings of that study it may be clinically beneficial to obtain a cardiology consultation.    DVT prophylaxis:  Medical DVT prophylaxis orders are present.    CODE STATUS:    Code Status (Patient has no pulse and is not breathing): CPR (Attempt to Resuscitate)  Medical Interventions (Patient has pulse or is breathing): Full Support    Admission Status:  I believe this patient meets inpatient status.    Aníbal Gilmore,

## 2024-01-02 NOTE — PLAN OF CARE
Goal Outcome Evaluation:  Plan of Care Reviewed With: patient        Progress: no change  Outcome Evaluation: patient is alert and oriented this shift. no c/o pain. iv fluids maintained. up to chair this shift. patient educated on incentive spirometer and patient demonstrated proper technique. no other changes at this time.

## 2024-01-02 NOTE — PLAN OF CARE
Goal Outcome Evaluation:  Plan of Care Reviewed With: patient        Progress: no change  Outcome Evaluation: pt aox4, vs stable. pt on room air. pt complains of congested cough but unable to produce any sputum.  prn tessalon perles given for cough.

## 2024-01-02 NOTE — ED PROVIDER NOTES
Time: 10:25 PM EST  Date of encounter:  1/1/2024  Independent Historian/Clinical History and Information was obtained by:   Patient and Family    History is limited by: N/A    Chief Complaint   Patient presents with    Cough    URI     Ongoing x 1 week         History of Present Illness:  Patient is a 73 y.o. year old male who presents to the emergency department for evaluation of cough, congestion, generalized weakness, shortness of breath for 1 week.  Patient lives alone and is having difficulties taking care of himself the past week.  Family reports they had to call EMS to bring him to the hospital and he had to sit down and take a break several times while walking out of the house.     Patient Care Team  Primary Care Provider: Amarilys Bear MD    Past Medical History:     No Known Allergies  Past Medical History:   Diagnosis Date    A-fib 2023    Coronary artery disease     Hyperlipidemia     Hypertension     Multiple rib fractures 2023    from a fall early spring 2023, healed without intervention    Myocardial infarction     Skin cancer     back, chest, arms (lots of things burned off)     Past Surgical History:   Procedure Laterality Date    APPENDECTOMY      CARDIAC ELECTROPHYSIOLOGY PROCEDURE N/A 6/30/2023    Procedure: Ablation atrial fibrillation, RF, rep emailed;  Surgeon: Adrian Valentino MD;  Location: [facility] INVASIVE LOCATION;  Service: Cardiovascular;  Laterality: N/A;    CORONARY ANGIOPLASTY      CORONARY ARTERY BYPASS GRAFT  08/30/2017    X10 Dr Kruger    SKIN CANCER EXCISION       Family History   Problem Relation Age of Onset    Heart disease Father        Home Medications:  Prior to Admission medications    Medication Sig Start Date End Date Taking? Authorizing Provider   allopurinol (ZYLOPRIM) 300 MG tablet Take 1 tablet by mouth Every Night. 10/19/17   Skylar German MD   aspirin 81 MG EC tablet Take 1 tablet by mouth Every Night. 9/8/17   Skylar German MD  "  atorvastatin (LIPITOR) 40 MG tablet TAKE ONE (1) TABLET BY MOUTH EVERY NIGHT AT BEDTIME 9/8/17   Provider, MD Skylar   hydrALAZINE (APRESOLINE) 100 MG tablet Take 0.5 tablets by mouth 2 (Two) Times a Day. 9/8/17   Skylar German MD   rivaroxaban (Xarelto) 20 MG tablet Take 1 tablet by mouth Daily. 5/16/23   ChemchirianAdrian MD        Social History:   Social History     Tobacco Use    Smoking status: Never     Passive exposure: Never    Smokeless tobacco: Never   Vaping Use    Vaping Use: Never used   Substance Use Topics    Alcohol use: No    Drug use: No         Review of Systems:  Review of Systems   Constitutional:  Positive for appetite change, chills and fever. Negative for activity change.   HENT:  Positive for congestion and rhinorrhea. Negative for sore throat.    Eyes: Negative.    Respiratory:  Positive for cough and shortness of breath.    Cardiovascular:  Negative for chest pain and leg swelling.   Gastrointestinal:  Negative for abdominal pain, diarrhea and vomiting.   Endocrine: Negative.    Genitourinary:  Negative for decreased urine volume and dysuria.   Musculoskeletal:  Negative for neck pain.   Skin:  Negative for rash and wound.   Allergic/Immunologic: Negative.    Neurological:  Positive for weakness and headaches. Negative for numbness.   Hematological: Negative.    Psychiatric/Behavioral: Negative.     All other systems reviewed and are negative.       Physical Exam:  /85   Pulse 90   Temp 97.8 °F (36.6 °C) (Oral)   Resp 20   Ht 171.5 cm (67.5\")   Wt 81.6 kg (180 lb)   SpO2 91%   BMI 27.78 kg/m²         Physical Exam  Vitals and nursing note reviewed.   Constitutional:       General: He is not in acute distress.     Appearance: Normal appearance. He is ill-appearing. He is not toxic-appearing.   HENT:      Head: Normocephalic and atraumatic.      Jaw: There is normal jaw occlusion.      Nose: Congestion present.   Eyes:      General: Lids are normal.      " Extraocular Movements: Extraocular movements intact.      Conjunctiva/sclera: Conjunctivae normal.      Pupils: Pupils are equal, round, and reactive to light.   Cardiovascular:      Rate and Rhythm: Normal rate and regular rhythm.      Pulses: Normal pulses.      Heart sounds: Normal heart sounds.   Pulmonary:      Effort: Pulmonary effort is normal. Tachypnea present. No respiratory distress.      Breath sounds: Normal breath sounds. No decreased breath sounds, wheezing, rhonchi or rales.   Abdominal:      General: Abdomen is flat.      Palpations: Abdomen is soft.      Tenderness: There is no abdominal tenderness. There is no guarding or rebound.   Musculoskeletal:         General: Normal range of motion.      Cervical back: Normal range of motion and neck supple.      Right lower leg: No edema.      Left lower leg: No edema.   Skin:     General: Skin is warm and dry.   Neurological:      Mental Status: He is alert and oriented to person, place, and time. Mental status is at baseline.   Psychiatric:         Mood and Affect: Mood normal.              Procedures:  Procedures      Medical Decision Making:      Comorbidities that affect care:    History of multiple CABGs, Atrial Fibrillation, Coronary Artery Disease, Hypertension    External Notes reviewed:    Previous Clinic Note: Cardiology office visit from 8/1/2023      The following orders were placed and all results were independently analyzed by me:  Orders Placed This Encounter   Procedures    COVID-19, FLU A/B, RSV PCR 1 HR TAT - Swab, Nasopharynx    XR Chest 1 View    [place] Draw    Comprehensive Metabolic Panel    BNP    Single High Sensitivity Troponin T    CBC Auto Differential    High Sensitivity Troponin T 2Hr    NPO Diet NPO Type: Strict NPO    Undress & Gown    Continuous Pulse Oximetry    Vital Signs    Inpatient Hospitalist Consult    Oxygen Therapy- Nasal Cannula; Titrate 1-6 LPM Per SpO2; 90 - 95%    ECG 12 Lead ED Triage Standing Order; SOA     Insert Peripheral IV    CBC & Differential    Green Top (Gel)    Lavender Top    Gold Top - SST    Light Blue Top       Medications Given in the Emergency Department:  Medications   sodium chloride 0.9 % flush 10 mL (has no administration in time range)        ED Course:    The patient was initially evaluated in the triage area where orders were placed. The patient was later dispositioned by GEORGINA Gilbert.      The patient was advised to stay for completion of workup which includes but is not limited to communication of labs and radiological results, reassessment and plan. The patient was advised that leaving prior to disposition by a provider could result in critical findings that are not communicated to the patient.          Labs:    Lab Results (last 24 hours)       Procedure Component Value Units Date/Time    COVID-19, FLU A/B, RSV PCR 1 HR TAT - Swab, Nasopharynx [[id number]]  (Abnormal) Collected: 01/01/24 1843    Specimen: Swab from Nasopharynx Updated: 01/01/24 1931     COVID19 Not Detected     Influenza A PCR Not Detected     Influenza B PCR Not Detected     RSV, PCR Detected    Narrative:      Fact sheet for providers: https://www.fda.gov/media/096939/download    Fact sheet for patients: https://www.fda.gov/media/019006/download    Test performed by PCR.    CBC & Differential [[id number]]  (Abnormal) Collected: 01/01/24 1855    Specimen: Blood from Arm, Right Updated: 01/01/24 1911    Narrative:      The following orders were created for panel order CBC & Differential.  Procedure                               Abnormality         Status                     ---------                               -----------         ------                     CBC Auto Differential[[id number]]        Abnormal            Final result                 Please view results for these tests on the individual orders.    Comprehensive Metabolic Panel [[id number]]  (Abnormal) Collected: 01/01/24 1855    Specimen: Blood from Arm,  Right Updated: 01/01/24 1935     Glucose 124 mg/dL      BUN 51 mg/dL      Creatinine 1.96 mg/dL      Sodium 137 mmol/L      Potassium 4.7 mmol/L      Chloride 102 mmol/L      CO2 20.1 mmol/L      Calcium 10.2 mg/dL      Total Protein 8.2 g/dL      Albumin 3.6 g/dL      ALT (SGPT) 156 U/L      AST (SGOT) 163 U/L      Alkaline Phosphatase 128 U/L      Total Bilirubin 0.8 mg/dL      Globulin 4.6 gm/dL      A/G Ratio 0.8 g/dL      BUN/Creatinine Ratio 26.0     Anion Gap 14.9 mmol/L      eGFR 35.4 mL/min/1.73     Narrative:      GFR Normal >60  Chronic Kidney Disease <60  Kidney Failure <15    The GFR formula is only valid for adults with stable renal function between ages 18 and 70.    BNP [[id number]]  (Abnormal) Collected: 01/01/24 1855    Specimen: Blood from Arm, Right Updated: 01/01/24 1930     proBNP 1,204.0 pg/mL     Narrative:      This assay is used as an aid in the diagnosis of individuals suspected of having heart failure. It can be used as an aid in the diagnosis of acute decompensated heart failure (ADHF) in patients presenting with signs and symptoms of ADHF to the emergency department (ED). In addition, NT-proBNP of <300 pg/mL indicates ADHF is not likely.    Age Range Result Interpretation  NT-proBNP Concentration (pg/mL:      <50             Positive            >450                   Gray                 300-450                    Negative             <300    50-75           Positive            >900                  Gray                300-900                  Negative            <300      >75             Positive            >1800                  Gray                300-1800                  Negative            <300    Single High Sensitivity Troponin T [[id number]]  (Abnormal) Collected: 01/01/24 1855    Specimen: Blood from Arm, Right Updated: 01/01/24 1944     HS Troponin T 70 ng/L     Narrative:      High Sensitive Troponin T Reference Range:  <14.0 ng/L- Negative Female for AMI  <22.0 ng/L- Negative  Male for AMI  >=14 - Abnormal Female indicating possible myocardial injury.  >=22 - Abnormal Male indicating possible myocardial injury.   Clinicians would have to utilize clinical acumen, EKG, Troponin, and serial changes to determine if it is an Acute Myocardial Infarction or myocardial injury due to an underlying chronic condition.         CBC Auto Differential [[id number]]  (Abnormal) Collected: 01/01/24 1855    Specimen: Blood from Arm, Right Updated: 01/01/24 1911     WBC 13.09 10*3/mm3      RBC 3.49 10*6/mm3      Hemoglobin 10.1 g/dL      Hematocrit 31.8 %      MCV 91.1 fL      MCH 28.9 pg      MCHC 31.8 g/dL      RDW 15.4 %      RDW-SD 51.1 fl      MPV 10.5 fL      Platelets 419 10*3/mm3      Neutrophil % 81.7 %      Lymphocyte % 9.6 %      Monocyte % 6.3 %      Eosinophil % 0.3 %      Basophil % 0.6 %      Immature Grans % 1.5 %      Neutrophils, Absolute 10.68 10*3/mm3      Lymphocytes, Absolute 1.26 10*3/mm3      Monocytes, Absolute 0.83 10*3/mm3      Eosinophils, Absolute 0.04 10*3/mm3      Basophils, Absolute 0.08 10*3/mm3      Immature Grans, Absolute 0.20 10*3/mm3      nRBC 0.0 /100 WBC     High Sensitivity Troponin T 2Hr [[id number]]  (Abnormal) Collected: 01/01/24 2139    Specimen: Blood from Arm, Left Updated: 01/01/24 2223     HS Troponin T 65 ng/L      Troponin T Delta -5 ng/L     Narrative:      High Sensitive Troponin T Reference Range:  <14.0 ng/L- Negative Female for AMI  <22.0 ng/L- Negative Male for AMI  >=14 - Abnormal Female indicating possible myocardial injury.  >=22 - Abnormal Male indicating possible myocardial injury.   Clinicians would have to utilize clinical acumen, EKG, Troponin, and serial changes to determine if it is an Acute Myocardial Infarction or myocardial injury due to an underlying chronic condition.                  Imaging:    XR Chest 1 View    Result Date: 1/1/2024  PROCEDURE: XR CHEST 1 VW  COMPARISON: 2/20/2023.  INDICATIONS: SOA/SOB/shortness of air/shortness of  breath.  FINDINGS:  A single AP (or PA) upright portable chest radiograph was performed.  Borderline cardiac enlargement is seen.  No acute infiltrate is appreciated.  No pleural effusion or pneumothorax is identified.  The patient has undergone median sternotomy and CABG surgery.  Degenerative changes involve the shoulders, greater on the left than the right.  There are degenerative changes throughout the imaged spine.  The thoracic aorta is atherosclerotic and ectatic.  There may be minimal atelectasis and/or fibrosis in the lung bases, especially on the left.  No significant interval change is seen since the prior study (or studies).        No acute infiltrate is appreciated.     Please note that portions of this note were completed with a voice recognition program.  ALICIA MORALEZ JR, MD       Electronically Signed and Approved By: ALICIA MORALEZ JR, MD on 1/01/2024 at 22:15                 Differential Diagnosis and Discussion:      Cough: Differential diagnosis includes but is not limited to pneumonia, acute bronchitis, upper respiratory infection, ACE inhibitor use, allergic reaction, epiglottitis, seasonal allergies, chemical irritants, exercise-induced asthma, viral syndrome.  Dyspnea: Differential diagnosis includes but is not limited to metabolic acidosis, neurological disorders, psychogenic, asthma, pneumothorax, upper airway obstruction, COPD, pneumonia, noncardiogenic pulmonary edema, interstitial lung disease, anemia, congestive heart failure, and pulmonary embolism    All labs were reviewed and interpreted by me.  All X-rays impressions were independently interpreted by me.  EKG was interpreted by supervising attending.    MDM     Amount and/or Complexity of Data Reviewed  Clinical lab tests: reviewed  Tests in the radiology section of CPT®: reviewed  Tests in the medicine section of CPT®: reviewed    Patient's worsening hypoxia and shortness of breath will consult with hospitalist for possible  admission.            Patient Care Considerations:    SEPSIS was considered but is NOT present in the emergency department as SIRS criteria is not present. ANTIBIOTICS: I considered prescribing antibiotics as an outpatient however no bacterial focus of infection was found.      Consultants/Shared Management Plan:    Hospitalist: I have discussed the case with Dr. Gilmore who agrees to accept the patient for admission.    Social Determinants of Health:    Patient is independent, reliable, and has access to care.       Disposition and Care Coordination:    Admit:   Through independent evaluation of the patient's history, physical, and imperical data, the patient meets criteria for observation/admission to the hospital.        Final diagnoses:   Hypoxia   RSV infection        ED Disposition       ED Disposition   Decision to Admit    Condition   --    Comment   --               This medical record created using voice recognition software.             Fernanda Sanders, GEORGINA  01/01/24 5540

## 2024-01-02 NOTE — THERAPY EVALUATION
Acute Care - Physical Therapy Initial Evaluation   [place]     Patient Name: Gilberto Sanders  : 1950  MRN: [id number]  Today's Date: 2024      Visit Dx:     ICD-10-CM ICD-9-CM   1. Hypoxia  R09.02 799.02   2. RSV infection  B33.8 079.6   3. Difficulty walking  R26.2 719.7     Patient Active Problem List   Diagnosis    S/P CABG x10 by Dr. Kruger    Coronary heart disease    Hyperlipidemia    Hypertension    Skin disorder    Status post coronary artery stent placement    Status post coronary artery bypass graft    Paroxysmal atrial fibrillation    Sick sinus syndrome    Acute respiratory failure with hypoxia     Past Medical History:   Diagnosis Date    -2023    Coronary artery disease     Hyperlipidemia     Hypertension     Multiple rib fractures     from a fall early spring 2023, healed without intervention    Myocardial infarction     Skin cancer     back, chest, arms (lots of things burned off)     Past Surgical History:   Procedure Laterality Date    APPENDECTOMY      CARDIAC ELECTROPHYSIOLOGY PROCEDURE N/A 2023    Procedure: Ablation atrial fibrillation, RF, rep emailed;  Surgeon: Adrian Valentino MD;  Location: [facility] CATH INVASIVE LOCATION;  Service: Cardiovascular;  Laterality: N/A;    CORONARY ANGIOPLASTY      CORONARY ARTERY BYPASS GRAFT  08/30/2017    X10 Dr Kruger    SKIN CANCER EXCISION       PT Assessment (last 12 hours)       PT Evaluation and Treatment       Row Name 24 1500          Physical Therapy Time and Intention    Document Type evaluation  -AV     Mode of Treatment individual therapy;physical therapy  -AV       Row Name 24 1500          General Information    Patient Profile Reviewed yes  -AV     Patient Observations alert;cooperative  -AV     Prior Level of Function --  Typically (I) with ADLs but reports increased difficulty. Ambulated without an assistive device. No home O2.  -AV     Equipment Currently Used at Home none  -AV     Existing  Precautions/Restrictions fall  -AV       Row Name 01/02/24 1500          Living Environment    Current Living Arrangements home  -AV     People in Home alone  -AV       Row Name 01/02/24 1500          Range of Motion (ROM)    Range of Motion bilateral lower extremities;ROM is WFL  -AV       Row Name 01/02/24 1500          Strength (Manual Muscle Testing)    Strength (Manual Muscle Testing) bilateral lower extremities;strength is WFL  -AV       Row Name 01/02/24 1500          Bed Mobility    Bed Mobility supine-sit;sit-supine  -AV     Supine-Sit [place] (Bed Mobility) contact guard  -AV     Sit-Supine [place] (Bed Mobility) contact guard  -AV       Row Name 01/02/24 1500          Transfers    Transfers sit-stand transfer;stand-sit transfer  -AV       Row Name 01/02/24 1500          Sit-Stand Transfer    Sit-Stand [place] (Transfers) contact guard  -AV     Assistive Device (Sit-Stand Transfers) --  No AD  -AV       Row Name 01/02/24 1500          Stand-Sit Transfer    Stand-Sit [place] (Transfers) contact guard  -AV     Assistive Device (Stand-Sit Transfers) --  No AD  -AV       Row Name 01/02/24 1500          Gait/Stairs (Locomotion)    Gait/Stairs Locomotion gait/ambulation independence;gait/ambulation assistive device;distance ambulated  -AV     [place] Level (Gait) contact guard  -AV     Distance in Feet (Gait) 40  -AV       Row Name 01/02/24 1500          Safety Issues, Functional Mobility    Impairments Affecting Function (Mobility) balance;endurance/activity tolerance;shortness of breath  -AV       Row Name 01/02/24 1500          Balance    Balance Assessment standing dynamic balance  -AV     Dynamic Standing Balance contact guard  -AV     Position/Device Used, Standing Balance unsupported  -       Row Name 01/02/24 1500          Plan of Care Review    Plan of Care Reviewed With patient  -AV     Progress no change  -AV     Outcome Evaluation Patient presents with deficits in balance,  endurance, transfers, and ambulation. Patient will benefit from skilled PT services to address these mobility deficits and decrease risk of falls.  -AV       Row Name 01/02/24 1500          Therapy Assessment/Plan (PT)    Rehab Potential (PT) good, to achieve stated therapy goals  -AV     Criteria for Skilled Interventions Met (PT) yes;meets criteria  -AV     Therapy Frequency (PT) daily  -AV     Predicted Duration of Therapy Intervention (PT) 10 days  -AV     Problem List (PT) problems related to;balance;mobility;strength  -AV     Activity Limitations Related to Problem List (PT) unable to transfer safely;unable to ambulate safely  -AV       Row Name 01/02/24 1500          PT Evaluation Complexity    History, PT Evaluation Complexity no personal factors and/or comorbidities  -AV     Examination of Body Systems (PT Eval Complexity) total of 4 or more elements  -AV     Clinical Presentation (PT Evaluation Complexity) stable  -AV     Clinical Decision Making (PT Evaluation Complexity) low complexity  -AV     Overall Complexity (PT Evaluation Complexity) low complexity  -AV       Row Name 01/02/24 1500          Therapy Plan Review/Discharge Plan (PT)    Therapy Plan Review (PT) evaluation/treatment results reviewed;patient  -AV       Row Name 01/02/24 1500          Physical Therapy Goals    Bed Mobility Goal Selection (PT) bed mobility, PT goal 1  -AV     Transfer Goal Selection (PT) transfer, PT goal 1  -AV     Gait Training Goal Selection (PT) gait training, PT goal 1  -AV       Row Name 01/02/24 1500          Bed Mobility Goal 1 (PT)    Activity/Assistive Device (Bed Mobility Goal 1, PT) sit to supine/supine to sit  -AV     [place] Level/Cues Needed (Bed Mobility Goal 1, PT) modified independence  -AV     Time Frame (Bed Mobility Goal 1, PT) 10 days  -AV       Row Name 01/02/24 1500          Transfer Goal 1 (PT)    Activity/Assistive Device (Transfer Goal 1, PT)  sit-to-stand/stand-to-sit;bed-to-chair/chair-to-bed  -AV     [place] Level/Cues Needed (Transfer Goal 1, PT) independent  -AV     Time Frame (Transfer Goal 1, PT) 10 days  -AV       Row Name 01/02/24 1500          Gait Training Goal 1 (PT)    Activity/Assistive Device (Gait Training Goal 1, PT) gait (walking locomotion)  -AV     [place] Level (Gait Training Goal 1, PT) independent  -AV     Distance (Gait Training Goal 1, PT) 150  -AV     Time Frame (Gait Training Goal 1, PT) 10 days  -AV               User Key  (r) = Recorded By, (t) = Taken By, (c) = Cosigned By      Initials Name Provider Type    AV David Marks, PT Physical Therapist                    Physical Therapy Education       Title: PT OT SLP Therapies (In Progress)       Topic: Physical Therapy (In Progress)       Point: Mobility training (Done)       Learning Progress Summary             Patient Acceptance, E,TB, VU by AV at 1/2/2024 1515                         Point: Home exercise program (Not Started)       Learner Progress:  Not documented in this visit.              Point: Body mechanics (Done)       Learning Progress Summary             Patient Acceptance, E,TB, VU by AV at 1/2/2024 1515                         Point: Precautions (Done)       Learning Progress Summary             Patient Acceptance, E,TB, VU by AV at 1/2/2024 1515                                         User Key       Initials Effective Dates Name Provider Type Discipline     06/11/21 -  David Marks, PT Physical Therapist PT                  PT Recommendation and Plan  Anticipated Discharge Disposition (PT): home with home health  Planned Therapy Interventions (PT): balance training, bed mobility training, gait training, home exercise program, neuromuscular re-education, strengthening, transfer training  Therapy Frequency (PT): daily  Plan of Care Reviewed With: patient  Progress: no change  Outcome Evaluation: Patient presents with deficits in balance,  endurance, transfers, and ambulation. Patient will benefit from skilled PT services to address these mobility deficits and decrease risk of falls.   Outcome Measures       Row Name 01/02/24 1500             How much help from another person do you currently need...    Turning from your back to your side while in flat bed without using bedrails? 4  -AV      Moving from lying on back to sitting on the side of a flat bed without bedrails? 3  -AV      Moving to and from a bed to a chair (including a wheelchair)? 3  -AV      Standing up from a chair using your arms (e.g., wheelchair, bedside chair)? 3  -AV      Climbing 3-5 steps with a railing? 3  -AV      To walk in hospital room? 3  -AV      AM-PAC 6 Clicks Score (PT) 19  -AV      Highest Level of Mobility Goal 6 --> Walk 10 steps or more  -AV         Functional Assessment    Outcome Measure Options AM-PAC 6 Clicks Basic Mobility (PT)  -AV                User Key  (r) = Recorded By, (t) = Taken By, (c) = Cosigned By      Initials Name Provider Type    AV David Marks, PT Physical Therapist                     Time Calculation:    PT Charges       Row Name 01/02/24 1515             Time Calculation    PT Received On 01/02/24  -AV      PT Goal Re-Cert Due Date 01/11/24  -AV         Untimed Charges    PT Eval/Re-eval Minutes 30  -AV         Total Minutes    Untimed Charges Total Minutes 30  -AV       Total Minutes 30  -AV                User Key  (r) = Recorded By, (t) = Taken By, (c) = Cosigned By      Initials Name Provider Type    AV David Marks, SULTANA Physical Therapist                  Therapy Charges for Today       Code Description Service Date Service Provider Modifiers Qty    20916037959 HC PT EVAL LOW COMPLEXITY 2 1/2/2024 David Marks, PT GP 1            PT G-Codes  Outcome Measure Options: AM-PAC 6 Clicks Basic Mobility (PT)  AM-PAC 6 Clicks Score (PT): 19    David Marks PT  1/2/2024

## 2024-01-03 LAB
ALBUMIN SERPL-MCNC: 2.9 G/DL (ref 3.5–5.2)
ALBUMIN SERPL-MCNC: 2.9 G/DL (ref 3.5–5.2)
ALP SERPL-CCNC: 120 U/L (ref 39–117)
ALT SERPL W P-5'-P-CCNC: 158 U/L (ref 1–41)
ANION GAP SERPL CALCULATED.3IONS-SCNC: 11.3 MMOL/L (ref 5–15)
AST SERPL-CCNC: 131 U/L (ref 1–40)
BASOPHILS # BLD AUTO: 0.04 10*3/MM3 (ref 0–0.2)
BASOPHILS NFR BLD AUTO: 0.2 % (ref 0–1.5)
BILIRUB CONJ SERPL-MCNC: <0.2 MG/DL (ref 0–0.3)
BILIRUB INDIRECT SERPL-MCNC: ABNORMAL MG/DL
BILIRUB SERPL-MCNC: 0.3 MG/DL (ref 0–1.2)
BUN SERPL-MCNC: 62 MG/DL (ref 8–23)
BUN/CREAT SERPL: 37.3 (ref 7–25)
CALCIUM SPEC-SCNC: 9.5 MG/DL (ref 8.6–10.5)
CHLORIDE SERPL-SCNC: 103 MMOL/L (ref 98–107)
CO2 SERPL-SCNC: 17.7 MMOL/L (ref 22–29)
CREAT SERPL-MCNC: 1.66 MG/DL (ref 0.76–1.27)
DEPRECATED RDW RBC AUTO: 49.8 FL (ref 37–54)
EGFRCR SERPLBLD CKD-EPI 2021: 43.3 ML/MIN/1.73
EOSINOPHIL # BLD AUTO: 0 10*3/MM3 (ref 0–0.4)
EOSINOPHIL NFR BLD AUTO: 0 % (ref 0.3–6.2)
ERYTHROCYTE [DISTWIDTH] IN BLOOD BY AUTOMATED COUNT: 15.2 % (ref 12.3–15.4)
FOLATE SERPL-MCNC: >20 NG/ML (ref 4.78–24.2)
GLUCOSE SERPL-MCNC: 142 MG/DL (ref 65–99)
HCT VFR BLD AUTO: 25.8 % (ref 37.5–51)
HGB BLD-MCNC: 8.3 G/DL (ref 13–17.7)
IMM GRANULOCYTES # BLD AUTO: 0.35 10*3/MM3 (ref 0–0.05)
IMM GRANULOCYTES NFR BLD AUTO: 2 % (ref 0–0.5)
IRON 24H UR-MRATE: 33 MCG/DL (ref 59–158)
IRON SATN MFR SERPL: 15 % (ref 20–50)
LYMPHOCYTES # BLD AUTO: 1.12 10*3/MM3 (ref 0.7–3.1)
LYMPHOCYTES NFR BLD AUTO: 6.3 % (ref 19.6–45.3)
MCH RBC QN AUTO: 29 PG (ref 26.6–33)
MCHC RBC AUTO-ENTMCNC: 32.2 G/DL (ref 31.5–35.7)
MCV RBC AUTO: 90.2 FL (ref 79–97)
MONOCYTES # BLD AUTO: 0.58 10*3/MM3 (ref 0.1–0.9)
MONOCYTES NFR BLD AUTO: 3.3 % (ref 5–12)
NEUTROPHILS NFR BLD AUTO: 15.57 10*3/MM3 (ref 1.7–7)
NEUTROPHILS NFR BLD AUTO: 88.2 % (ref 42.7–76)
NRBC BLD AUTO-RTO: 0 /100 WBC (ref 0–0.2)
PHOSPHATE SERPL-MCNC: 4.1 MG/DL (ref 2.5–4.5)
PLATELET # BLD AUTO: 335 10*3/MM3 (ref 140–450)
PMV BLD AUTO: 10.7 FL (ref 6–12)
POTASSIUM SERPL-SCNC: 4 MMOL/L (ref 3.5–5.2)
PROT SERPL-MCNC: 6.9 G/DL (ref 6–8.5)
RBC # BLD AUTO: 2.86 10*6/MM3 (ref 4.14–5.8)
SODIUM SERPL-SCNC: 132 MMOL/L (ref 136–145)
TIBC SERPL-MCNC: 221 MCG/DL (ref 298–536)
TRANSFERRIN SERPL-MCNC: 148 MG/DL (ref 200–360)
VIT B12 BLD-MCNC: 1834 PG/ML (ref 211–946)
WBC NRBC COR # BLD AUTO: 17.66 10*3/MM3 (ref 3.4–10.8)

## 2024-01-03 PROCEDURE — 85025 COMPLETE CBC W/AUTO DIFF WBC: CPT | Performed by: INTERNAL MEDICINE

## 2024-01-03 PROCEDURE — 82248 BILIRUBIN DIRECT: CPT | Performed by: INTERNAL MEDICINE

## 2024-01-03 PROCEDURE — 94799 UNLISTED PULMONARY SVC/PX: CPT

## 2024-01-03 PROCEDURE — 99233 SBSQ HOSP IP/OBS HIGH 50: CPT | Performed by: INTERNAL MEDICINE

## 2024-01-03 PROCEDURE — 25010000002 METHYLPREDNISOLONE PER 125 MG: Performed by: FAMILY MEDICINE

## 2024-01-03 PROCEDURE — 84466 ASSAY OF TRANSFERRIN: CPT | Performed by: INTERNAL MEDICINE

## 2024-01-03 PROCEDURE — 84100 ASSAY OF PHOSPHORUS: CPT | Performed by: INTERNAL MEDICINE

## 2024-01-03 PROCEDURE — 94664 DEMO&/EVAL PT USE INHALER: CPT

## 2024-01-03 PROCEDURE — 80053 COMPREHEN METABOLIC PANEL: CPT | Performed by: INTERNAL MEDICINE

## 2024-01-03 PROCEDURE — 25010000002 HEPARIN (PORCINE) PER 1000 UNITS: Performed by: FAMILY MEDICINE

## 2024-01-03 PROCEDURE — 25810000003 SODIUM CHLORIDE 0.9 % SOLUTION: Performed by: FAMILY MEDICINE

## 2024-01-03 PROCEDURE — 83540 ASSAY OF IRON: CPT | Performed by: INTERNAL MEDICINE

## 2024-01-03 RX ORDER — PREDNISONE 20 MG/1
40 TABLET ORAL
Status: DISCONTINUED | OUTPATIENT
Start: 2024-01-04 | End: 2024-01-06 | Stop reason: HOSPADM

## 2024-01-03 RX ORDER — ATORVASTATIN CALCIUM 40 MG/1
40 TABLET, FILM COATED ORAL NIGHTLY
Status: DISCONTINUED | OUTPATIENT
Start: 2024-01-03 | End: 2024-01-04

## 2024-01-03 RX ORDER — ASPIRIN 81 MG/1
81 TABLET ORAL NIGHTLY
Status: DISCONTINUED | OUTPATIENT
Start: 2024-01-03 | End: 2024-01-03

## 2024-01-03 RX ORDER — ALLOPURINOL 300 MG/1
300 TABLET ORAL NIGHTLY
Status: DISCONTINUED | OUTPATIENT
Start: 2024-01-03 | End: 2024-01-06 | Stop reason: HOSPADM

## 2024-01-03 RX ORDER — ASPIRIN 81 MG/1
81 TABLET ORAL DAILY
Status: DISCONTINUED | OUTPATIENT
Start: 2024-01-03 | End: 2024-01-06 | Stop reason: HOSPADM

## 2024-01-03 RX ORDER — FAMOTIDINE 20 MG/1
20 TABLET, FILM COATED ORAL
Status: DISCONTINUED | OUTPATIENT
Start: 2024-01-03 | End: 2024-01-06 | Stop reason: HOSPADM

## 2024-01-03 RX ADMIN — HEPARIN SODIUM 5000 UNITS: 5000 INJECTION INTRAVENOUS; SUBCUTANEOUS at 15:09

## 2024-01-03 RX ADMIN — ATORVASTATIN CALCIUM 40 MG: 40 TABLET, FILM COATED ORAL at 20:40

## 2024-01-03 RX ADMIN — FAMOTIDINE 20 MG: 20 TABLET, FILM COATED ORAL at 17:50

## 2024-01-03 RX ADMIN — Medication 10 ML: at 08:34

## 2024-01-03 RX ADMIN — SODIUM CHLORIDE 75 ML/HR: 9 INJECTION, SOLUTION INTRAVENOUS at 15:09

## 2024-01-03 RX ADMIN — IPRATROPIUM BROMIDE AND ALBUTEROL SULFATE 3 ML: 2.5; .5 SOLUTION RESPIRATORY (INHALATION) at 08:00

## 2024-01-03 RX ADMIN — GUAIFENESIN 1200 MG: 600 TABLET ORAL at 20:40

## 2024-01-03 RX ADMIN — HEPARIN SODIUM 5000 UNITS: 5000 INJECTION INTRAVENOUS; SUBCUTANEOUS at 20:40

## 2024-01-03 RX ADMIN — METHYLPREDNISOLONE SODIUM SUCCINATE 60 MG: 125 INJECTION INTRAMUSCULAR; INTRAVENOUS at 02:16

## 2024-01-03 RX ADMIN — IPRATROPIUM BROMIDE AND ALBUTEROL SULFATE 3 ML: 2.5; .5 SOLUTION RESPIRATORY (INHALATION) at 13:37

## 2024-01-03 RX ADMIN — HEPARIN SODIUM 5000 UNITS: 5000 INJECTION INTRAVENOUS; SUBCUTANEOUS at 06:40

## 2024-01-03 RX ADMIN — Medication 10 ML: at 20:41

## 2024-01-03 RX ADMIN — GUAIFENESIN 1200 MG: 600 TABLET ORAL at 08:33

## 2024-01-03 RX ADMIN — ALLOPURINOL 300 MG: 300 TABLET ORAL at 20:40

## 2024-01-03 RX ADMIN — IPRATROPIUM BROMIDE AND ALBUTEROL SULFATE 3 ML: 2.5; .5 SOLUTION RESPIRATORY (INHALATION) at 19:38

## 2024-01-03 RX ADMIN — BENZONATATE 100 MG: 100 CAPSULE ORAL at 02:18

## 2024-01-03 RX ADMIN — SODIUM CHLORIDE 75 ML/HR: 9 INJECTION, SOLUTION INTRAVENOUS at 02:16

## 2024-01-03 RX ADMIN — ASPIRIN 81 MG: 81 TABLET, COATED ORAL at 15:09

## 2024-01-03 RX ADMIN — METHYLPREDNISOLONE SODIUM SUCCINATE 60 MG: 125 INJECTION INTRAMUSCULAR; INTRAVENOUS at 08:34

## 2024-01-03 NOTE — PLAN OF CARE
Goal Outcome Evaluation:  Plan of Care Reviewed With: patient, spouse        Progress: improving  Outcome Evaluation: patient rested throughout the day. wife at bedside part of the day. up in chair. bowel movement today. patient given flutter valve and now coughing up clear sputum. remains on room air. no concerns at this time.

## 2024-01-03 NOTE — PROGRESS NOTES
[facility]   Hospitalist Progress Note  Date: 1/3/2024  Patient Name: Gliberto Sanders  : 1950  MRN: [id number]  Date of admission: 2024  Room/Bed: [facility]      Subjective   Subjective     Chief Complaint: SOA    Summary: Gilberto Sanders is a 73 y.o. male who presented to the emergency department with shortness of breath cough chest congestion.  He was found to have RSV bronchiolitis and will be admitted for further evaluation and treatment     Interval Followup: Feeling better; still w/ cough and weakness    Review of Systems    All systems reviewed and negative except for what is outlined above.      Objective   Objective     Vitals:   Temp:  [97.5 °F (36.4 °C)-97.9 °F (36.6 °C)] 97.7 °F (36.5 °C)  Heart Rate:  [66-79] 66  Resp:  [16-20] 18  BP: (128-151)/(66-82) 141/74    Physical Exam   General: Awake, alert, NAD, nontoxic but looks like he doesn't feel great  HENT: NCAT, MMM  Eyes: pupils equal, no scleral icterus  Cardiovascular: RRR, no murmurs   Pulmonary: dim bs rufino  Gastrointestinal: S/ND/NT, +BS  Musculoskeletal: No gross deformities  Skin: No jaundice, no rash on exposed skin appreciated  Neuro: CN II through XII grossly intact; speech clear; no tremor      Result Review    Result Review:  I have personally reviewed these results 1/3/2024    [x]  Laboratory      Lab 24  0620 24  0414 24  1855   WBC 17.66* 12.13* 13.09*   HEMOGLOBIN 8.3* 9.8* 10.1*   HEMATOCRIT 25.8* 30.7* 31.8*   PLATELETS 335 397 419   NEUTROS ABS 15.57* 10.46* 10.68*   IMMATURE GRANS (ABS) 0.35* 0.20* 0.20*   LYMPHS ABS 1.12 0.91 1.26   MONOS ABS 0.58 0.46 0.83   EOS ABS 0.00 0.03 0.04   MCV 90.2 91.1 91.1         Lab 24  0620 24  0414 24  1855   SODIUM 132* 135* 137   POTASSIUM 4.0 4.4 4.7   CHLORIDE 103 102 102   CO2 17.7* 19.9* 20.1*   ANION GAP 11.3 13.1 14.9   BUN 62* 58* 51*   CREATININE 1.66* 2.02* 1.96*   EGFR 43.3* 34.2* 35.4*   GLUCOSE 142* 122* 124*   CALCIUM 9.5 9.7 10.2    PHOSPHORUS 4.1  --   --          Lab 01/03/24  0620 01/02/24  0414 01/01/24  1855   TOTAL PROTEIN 6.9 7.5 8.2   ALBUMIN 2.9*  2.9* 3.3* 3.6   GLOBULIN  --  4.2 4.6   ALT (SGPT) 158* 159* 156*   AST (SGOT) 131* 168* 163*   BILIRUBIN 0.3 0.7 0.8   BILIRUBIN DIRECT <0.2  --   --    ALK PHOS 120* 123* 128*         Lab 01/02/24  0414 01/01/24  2139 01/01/24  1855   PROBNP  --   --  1,204.0*   HSTROP T 67* 65* 70*             Lab 01/03/24  0620   IRON 33*   IRON SATURATION (TSAT) 15*   TIBC 221*   TRANSFERRIN 148*         Brief Urine Lab Results       None          [x]  Microbiology   Microbiology Results (last 10 days)       Procedure Component Value - Date/Time    COVID-19, FLU A/B, RSV PCR 1 HR TAT - Swab, Nasopharynx [[id number]]  (Abnormal) Collected: 01/01/24 1843    Lab Status: Final result Specimen: Swab from Nasopharynx Updated: 01/01/24 1931     COVID19 Not Detected     Influenza A PCR Not Detected     Influenza B PCR Not Detected     RSV, PCR Detected    Narrative:      Fact sheet for providers: https://www.fda.gov/media/322194/download    Fact sheet for patients: https://www.fda.gov/media/168670/download    Test performed by PCR.          [x]  Radiology  XR Chest 1 View    Result Date: 1/1/2024    No acute infiltrate is appreciated.     Please note that portions of this note were completed with a voice recognition program.  ALICIA MORALEZ JR, MD       Electronically Signed and Approved By: ALICIA MORALEZ JR, MD on 1/01/2024 at 22:15             []  EKG/Telemetry   []  Cardiology/Vascular   []  Pathology  []  Old records  []  Other:    Assessment & Plan   Assessment / Plan     Assessment:  RSV bronchiolitis  CAITY  Transaminitis  CAD    Plan:  Admitted to Medicine  De-escalate Solu-Medrol to prednisone  Resumed aspirin  Hold hydralazine for now  Recheck LFT's in am.  He has no abd pain.  May be 2/2 viral infection.    Discussed with RN 1/3/2024      DVT prophylaxis:  Medical DVT prophylaxis orders are  present.    CODE STATUS:   Code Status (Patient has no pulse and is not breathing): CPR (Attempt to Resuscitate)  Medical Interventions (Patient has pulse or is breathing): Full Support      Electronically signed by Aníbal La MD, 01/03/24, 1:08 PM EST.

## 2024-01-03 NOTE — PLAN OF CARE
Goal Outcome Evaluation:  Plan of Care Reviewed With: patient        Progress: improving  Outcome Evaluation: pt aox4, vs stable. pt remains on room air. pt with nonproductive cngested cough, prn tessalon perle given once per mar. pt reports feeling better but still weak.

## 2024-01-04 ENCOUNTER — APPOINTMENT (OUTPATIENT)
Dept: ULTRASOUND IMAGING | Facility: HOSPITAL | Age: 74
DRG: 202 | End: 2024-01-04
Payer: MEDICARE

## 2024-01-04 LAB
ALBUMIN SERPL-MCNC: 2.9 G/DL (ref 3.5–5.2)
ALBUMIN/GLOB SERPL: 0.9 G/DL
ALP SERPL-CCNC: 130 U/L (ref 39–117)
ALT SERPL W P-5'-P-CCNC: 215 U/L (ref 1–41)
ANION GAP SERPL CALCULATED.3IONS-SCNC: 9.1 MMOL/L (ref 5–15)
AST SERPL-CCNC: 163 U/L (ref 1–40)
BASOPHILS # BLD AUTO: 0.04 10*3/MM3 (ref 0–0.2)
BASOPHILS NFR BLD AUTO: 0.2 % (ref 0–1.5)
BILIRUB SERPL-MCNC: 0.3 MG/DL (ref 0–1.2)
BUN SERPL-MCNC: 58 MG/DL (ref 8–23)
BUN/CREAT SERPL: 34.1 (ref 7–25)
CALCIUM SPEC-SCNC: 9.4 MG/DL (ref 8.6–10.5)
CHLORIDE SERPL-SCNC: 109 MMOL/L (ref 98–107)
CO2 SERPL-SCNC: 16.9 MMOL/L (ref 22–29)
CREAT SERPL-MCNC: 1.7 MG/DL (ref 0.76–1.27)
DEPRECATED RDW RBC AUTO: 52.3 FL (ref 37–54)
EGFRCR SERPLBLD CKD-EPI 2021: 42 ML/MIN/1.73
EOSINOPHIL # BLD AUTO: 0 10*3/MM3 (ref 0–0.4)
EOSINOPHIL NFR BLD AUTO: 0 % (ref 0.3–6.2)
ERYTHROCYTE [DISTWIDTH] IN BLOOD BY AUTOMATED COUNT: 15.5 % (ref 12.3–15.4)
GLOBULIN UR ELPH-MCNC: 3.2 GM/DL
GLUCOSE SERPL-MCNC: 97 MG/DL (ref 65–99)
HCT VFR BLD AUTO: 25.8 % (ref 37.5–51)
HGB BLD-MCNC: 8.1 G/DL (ref 13–17.7)
IMM GRANULOCYTES # BLD AUTO: 0.84 10*3/MM3 (ref 0–0.05)
IMM GRANULOCYTES NFR BLD AUTO: 4 % (ref 0–0.5)
LYMPHOCYTES # BLD AUTO: 1.81 10*3/MM3 (ref 0.7–3.1)
LYMPHOCYTES NFR BLD AUTO: 8.6 % (ref 19.6–45.3)
MCH RBC QN AUTO: 29.1 PG (ref 26.6–33)
MCHC RBC AUTO-ENTMCNC: 31.4 G/DL (ref 31.5–35.7)
MCV RBC AUTO: 92.8 FL (ref 79–97)
MONOCYTES # BLD AUTO: 1.25 10*3/MM3 (ref 0.1–0.9)
MONOCYTES NFR BLD AUTO: 5.9 % (ref 5–12)
NEUTROPHILS NFR BLD AUTO: 17.18 10*3/MM3 (ref 1.7–7)
NEUTROPHILS NFR BLD AUTO: 81.3 % (ref 42.7–76)
NRBC BLD AUTO-RTO: 0 /100 WBC (ref 0–0.2)
PLATELET # BLD AUTO: 355 10*3/MM3 (ref 140–450)
PMV BLD AUTO: 10.6 FL (ref 6–12)
POTASSIUM SERPL-SCNC: 4.1 MMOL/L (ref 3.5–5.2)
PROT SERPL-MCNC: 6.1 G/DL (ref 6–8.5)
RBC # BLD AUTO: 2.78 10*6/MM3 (ref 4.14–5.8)
SODIUM SERPL-SCNC: 135 MMOL/L (ref 136–145)
WBC NRBC COR # BLD AUTO: 21.12 10*3/MM3 (ref 3.4–10.8)

## 2024-01-04 PROCEDURE — 25810000003 SODIUM CHLORIDE 0.9 % SOLUTION: Performed by: FAMILY MEDICINE

## 2024-01-04 PROCEDURE — 99232 SBSQ HOSP IP/OBS MODERATE 35: CPT | Performed by: INTERNAL MEDICINE

## 2024-01-04 PROCEDURE — 25010000002 HEPARIN (PORCINE) PER 1000 UNITS: Performed by: FAMILY MEDICINE

## 2024-01-04 PROCEDURE — 94799 UNLISTED PULMONARY SVC/PX: CPT

## 2024-01-04 PROCEDURE — 97110 THERAPEUTIC EXERCISES: CPT

## 2024-01-04 PROCEDURE — 97530 THERAPEUTIC ACTIVITIES: CPT

## 2024-01-04 PROCEDURE — 86803 HEPATITIS C AB TEST: CPT | Performed by: INTERNAL MEDICINE

## 2024-01-04 PROCEDURE — 63710000001 PREDNISONE PER 1 MG: Performed by: INTERNAL MEDICINE

## 2024-01-04 PROCEDURE — 86704 HEP B CORE ANTIBODY TOTAL: CPT | Performed by: INTERNAL MEDICINE

## 2024-01-04 PROCEDURE — 87340 HEPATITIS B SURFACE AG IA: CPT | Performed by: INTERNAL MEDICINE

## 2024-01-04 PROCEDURE — 80053 COMPREHEN METABOLIC PANEL: CPT | Performed by: INTERNAL MEDICINE

## 2024-01-04 PROCEDURE — 86706 HEP B SURFACE ANTIBODY: CPT | Performed by: INTERNAL MEDICINE

## 2024-01-04 PROCEDURE — 85025 COMPLETE CBC W/AUTO DIFF WBC: CPT | Performed by: INTERNAL MEDICINE

## 2024-01-04 PROCEDURE — 94664 DEMO&/EVAL PT USE INHALER: CPT

## 2024-01-04 RX ADMIN — IPRATROPIUM BROMIDE AND ALBUTEROL SULFATE 3 ML: 2.5; .5 SOLUTION RESPIRATORY (INHALATION) at 00:22

## 2024-01-04 RX ADMIN — HEPARIN SODIUM 5000 UNITS: 5000 INJECTION INTRAVENOUS; SUBCUTANEOUS at 22:24

## 2024-01-04 RX ADMIN — HEPARIN SODIUM 5000 UNITS: 5000 INJECTION INTRAVENOUS; SUBCUTANEOUS at 15:46

## 2024-01-04 RX ADMIN — PREDNISONE 40 MG: 20 TABLET ORAL at 09:24

## 2024-01-04 RX ADMIN — DOCUSATE SODIUM 50MG AND SENNOSIDES 8.6MG 2 TABLET: 8.6; 5 TABLET, FILM COATED ORAL at 22:24

## 2024-01-04 RX ADMIN — GUAIFENESIN 1200 MG: 600 TABLET ORAL at 22:23

## 2024-01-04 RX ADMIN — ALLOPURINOL 300 MG: 300 TABLET ORAL at 22:23

## 2024-01-04 RX ADMIN — Medication 10 ML: at 22:24

## 2024-01-04 RX ADMIN — GUAIFENESIN 1200 MG: 600 TABLET ORAL at 09:24

## 2024-01-04 RX ADMIN — SODIUM CHLORIDE 75 ML/HR: 9 INJECTION, SOLUTION INTRAVENOUS at 04:37

## 2024-01-04 RX ADMIN — HEPARIN SODIUM 5000 UNITS: 5000 INJECTION INTRAVENOUS; SUBCUTANEOUS at 06:09

## 2024-01-04 RX ADMIN — IPRATROPIUM BROMIDE AND ALBUTEROL SULFATE 3 ML: 2.5; .5 SOLUTION RESPIRATORY (INHALATION) at 19:49

## 2024-01-04 RX ADMIN — DOCUSATE SODIUM 50MG AND SENNOSIDES 8.6MG 2 TABLET: 8.6; 5 TABLET, FILM COATED ORAL at 09:24

## 2024-01-04 RX ADMIN — ASPIRIN 81 MG: 81 TABLET, COATED ORAL at 09:24

## 2024-01-04 RX ADMIN — IPRATROPIUM BROMIDE AND ALBUTEROL SULFATE 3 ML: 2.5; .5 SOLUTION RESPIRATORY (INHALATION) at 08:24

## 2024-01-04 RX ADMIN — IPRATROPIUM BROMIDE AND ALBUTEROL SULFATE 3 ML: 2.5; .5 SOLUTION RESPIRATORY (INHALATION) at 12:17

## 2024-01-04 RX ADMIN — FAMOTIDINE 20 MG: 20 TABLET, FILM COATED ORAL at 06:10

## 2024-01-04 RX ADMIN — FAMOTIDINE 20 MG: 20 TABLET, FILM COATED ORAL at 16:36

## 2024-01-04 RX ADMIN — Medication 10 ML: at 09:24

## 2024-01-04 NOTE — PLAN OF CARE
Goal Outcome Evaluation:  Plan of Care Reviewed With: patient        Progress: no change  Outcome Evaluation: patient is alert and oriented x4 and on room air. iv fluids continued per mar. patient has a infrequent, unproductive cough. educated patient on the use of incentative spirometer. up to bathroom with stnad-by assist. possible d/c today. no other issues at this time.

## 2024-01-04 NOTE — CONSULTS
"Nutrition Services    Patient Name: Gilberto Sanders  YOB: 1950  MRN: [id number]  Admission date: 1/1/2024      CLINICAL NUTRITION ASSESSMENT      Reason for Assessment  Physician Consult and MST Score 2+     H&P:  Past Medical History:   Diagnosis Date    A-fib 2023    Coronary artery disease     Hyperlipidemia     Hypertension     Multiple rib fractures 2023    from a fall early spring 2023, healed without intervention    Myocardial infarction     Skin cancer     back, chest, arms (lots of things burned off)        Current Problems:   Active Hospital Problems    Diagnosis     **Acute respiratory failure with hypoxia         Nutrition/Diet History         Narrative   Patient admitted with acute respiratory failure.  Noted RSV infection.  Patient states he has intentionally lost weight since having CABG in 2018 and starting to follow a healthy heart diet.  He has lost weight more abruptly over the past few weeks.  Patient has a good appetite and is consuming % of meals.      Agreeable to adding oral nutrition supplement.      NFPE performed with no significant findings to note.      Anthropometrics        Current Height, Weight Height: 171.5 cm (67.5\")  Weight: 74.7 kg (164 lb 10.9 oz)   Current BMI Body mass index is 25.41 kg/m².   BMI Classification Overweight   % %   Adjusted Body Weight (ABW)    Weight Hx  Wt Readings from Last 30 Encounters:   01/02/24 0137 74.7 kg (164 lb 10.9 oz)   01/01/24 1831 81.6 kg (180 lb)   10/17/23 1610 82.1 kg (181 lb)   08/01/23 1413 73.9 kg (163 lb)   06/30/23 0700 78.1 kg (172 lb 2.9 oz)   05/16/23 1022 82.6 kg (182 lb)   04/12/23 1343 86.2 kg (190 lb)   02/20/23 1037 82.2 kg (181 lb 3.2 oz)   10/12/22 1220 89.4 kg (197 lb)   02/24/22 1303 88.5 kg (195 lb)   08/04/21 1325 88.9 kg (196 lb)   01/18/21 1313 94.8 kg (209 lb)   06/29/20 1407 94.8 kg (209 lb)   08/29/19 1126 93 kg (205 lb)   10/09/17 1043 86.9 kg (191 lb 9.6 oz)          Wt Change Observation " "-9% x 3 months,  -9.1% x 11 months      Estimated/Assessed Needs  Estimated Needs based on: Current Body Weight       Energy Requirements 30 kcal/kg    EST Needs (kcal/day) 2241       Protein Requirements 1.0 g/kg   EST Daily Needs (g/day) 75       Fluid Requirements 25 ml/kg    Estimated Needs (mL/day) 1868     Labs/Medications         Pertinent Labs Reviewed.   Results from last 7 days   Lab Units 01/04/24  0609 01/03/24  0620 01/02/24  0414   SODIUM mmol/L 135* 132* 135*   POTASSIUM mmol/L 4.1 4.0 4.4   CHLORIDE mmol/L 109* 103 102   CO2 mmol/L 16.9* 17.7* 19.9*   BUN mg/dL 58* 62* 58*   CREATININE mg/dL 1.70* 1.66* 2.02*   CALCIUM mg/dL 9.4 9.5 9.7   BILIRUBIN mg/dL 0.3 0.3 0.7   ALK PHOS U/L 130* 120* 123*   ALT (SGPT) U/L 215* 158* 159*   AST (SGOT) U/L 163* 131* 168*   GLUCOSE mg/dL 97 142* 122*     Results from last 7 days   Lab Units 01/04/24  0609 01/03/24  0620   PHOSPHORUS mg/dL  --  4.1   HEMOGLOBIN g/dL 8.1* 8.3*   HEMATOCRIT % 25.8* 25.8*     COVID19   Date Value Ref Range Status   01/01/2024 Not Detected Not Detected - Ref. Range Final     No results found for: \"HGBA1C\"      Pertinent Medications Reviewed.     Malnutrition Severity Assessment                Nutrition Diagnosis         Nutrition Dx Problem 1 Unintentional weight loss related to decreased ability to consume sufficient energy as evidenced by  -9% total body weight x 3 months.         Nutrition Intervention           Current Nutrition Orders & Evaluation of Intake       Current PO Diet Diet: Cardiac Diets; Healthy Heart (2-3 Na+); Texture: Regular Texture (IDDSI 7); Fluid Consistency: Thin (IDDSI 0)   Supplement No active supplement orders           Nutrition Intervention/Prescription        Boost Plus BID (Provides 720 kcals, 28 g protein if consumed)         Medical Nutrition Therapy/Nutrition Education          Learner     Readiness Patient  Acceptance     Method     Response Explanation  Verbalizes understanding "     Monitor/Evaluation        Monitor Per protocol, PO intake, Supplement intake, Pertinent labs, Weight       Nutrition Discharge Plan         To be determined       Electronically signed by:  Janelle Jama RD  01/04/24 15:43 EST

## 2024-01-04 NOTE — PLAN OF CARE
Goal Outcome Evaluation:  Plan of Care Reviewed With: patient        Progress: no change  Outcome Evaluation: Pleasant patient rested in bed throughout the day. No complaints of pain or discomfort. Medicated per MAR. Plans to have US done of liver tomorrow. Will be NPO after midnight.

## 2024-01-04 NOTE — PROGRESS NOTES
[facility]   Hospitalist Progress Note  Date: 2024  Patient Name: Gilberto Sanders  : 1950  MRN: [id number]  Date of admission: 2024  Room/Bed: [facility]      Subjective   Subjective     Chief Complaint: SOA    Summary: Gilberto Sanders is a 73 y.o. male who presented to the emergency department with shortness of breath cough chest congestion.  He was found to have RSV bronchiolitis and will be admitted for further evaluation and treatment     Interval Followup: overall improved but coughed and threw up this am.    Review of Systems    All systems reviewed and negative except for what is outlined above.      Objective   Objective     Vitals:   Temp:  [97.4 °F (36.3 °C)-98 °F (36.7 °C)] 97.4 °F (36.3 °C)  Heart Rate:  [58-86] 78  Resp:  [18-22] 18  BP: (122-160)/(66-86) 122/66    Physical Exam   General: lying in bed in nad; looks tired but nontoxic  HENT: NCAT, MMM  Eyes: pupils equal, no scleral icterus  Cardiovascular: RRR, no murmurs   Pulmonary: dim bs rufino  Gastrointestinal: S/ND/NT, +BS  Musculoskeletal: No gross deformities  Skin: No jaundice, no rash on exposed skin appreciated  Neuro: CN II through XII grossly intact; speech clear; no tremor      Result Review    Result Review:  I have personally reviewed these results 2024    [x]  Laboratory      Lab 24  0609 24  0620 24  0414   WBC 21.12* 17.66* 12.13*   HEMOGLOBIN 8.1* 8.3* 9.8*   HEMATOCRIT 25.8* 25.8* 30.7*   PLATELETS 355 335 397   NEUTROS ABS 17.18* 15.57* 10.46*   IMMATURE GRANS (ABS) 0.84* 0.35* 0.20*   LYMPHS ABS 1.81 1.12 0.91   MONOS ABS 1.25* 0.58 0.46   EOS ABS 0.00 0.00 0.03   MCV 92.8 90.2 91.1         Lab 24  0609 24  0620 24  0414   SODIUM 135* 132* 135*   POTASSIUM 4.1 4.0 4.4   CHLORIDE 109* 103 102   CO2 16.9* 17.7* 19.9*   ANION GAP 9.1 11.3 13.1   BUN 58* 62* 58*   CREATININE 1.70* 1.66* 2.02*   EGFR 42.0* 43.3* 34.2*   GLUCOSE 97 142* 122*   CALCIUM 9.4 9.5 9.7   PHOSPHORUS  --   4.1  --          Lab 01/04/24  0609 01/03/24  0620 01/02/24 0414 01/01/24  1855   TOTAL PROTEIN 6.1 6.9 7.5 8.2   ALBUMIN 2.9* 2.9*  2.9* 3.3* 3.6   GLOBULIN 3.2  --  4.2 4.6   ALT (SGPT) 215* 158* 159* 156*   AST (SGOT) 163* 131* 168* 163*   BILIRUBIN 0.3 0.3 0.7 0.8   BILIRUBIN DIRECT  --  <0.2  --   --    ALK PHOS 130* 120* 123* 128*         Lab 01/02/24  0414 01/01/24 2139 01/01/24  1855   PROBNP  --   --  1,204.0*   HSTROP T 67* 65* 70*             Lab 01/03/24  0620 01/01/24  1855   IRON 33*  --    IRON SATURATION (TSAT) 15*  --    TIBC 221*  --    TRANSFERRIN 148*  --    FOLATE  --  >20.00   VITAMIN B 12  --  1,834*         Brief Urine Lab Results       None          [x]  Microbiology   Microbiology Results (last 10 days)       Procedure Component Value - Date/Time    COVID-19, FLU A/B, RSV PCR 1 HR TAT - Swab, Nasopharynx [[id number]]  (Abnormal) Collected: 01/01/24 1843    Lab Status: Final result Specimen: Swab from Nasopharynx Updated: 01/01/24 1931     COVID19 Not Detected     Influenza A PCR Not Detected     Influenza B PCR Not Detected     RSV, PCR Detected    Narrative:      Fact sheet for providers: https://www.fda.gov/media/442265/download    Fact sheet for patients: https://www.fda.gov/media/122601/download    Test performed by PCR.          [x]  Radiology  XR Chest 1 View    Result Date: 1/1/2024    No acute infiltrate is appreciated.     Please note that portions of this note were completed with a voice recognition program.  ALICIA MORALEZ JR, MD       Electronically Signed and Approved By: ALICIA MORALEZ JR, MD on 1/01/2024 at 22:15             []  EKG/Telemetry   []  Cardiology/Vascular   []  Pathology  []  Old records  []  Other:    Assessment & Plan   Assessment / Plan     Assessment:  RSV bronchiolitis  CAITY  Transaminitis  CAD    Plan:  Admitted to Medicine  De-escalated Solu-Medrol to prednisone  Resumed aspirin  Holding hydralazine for now  Trend LFTs  Hold statin  Will get RUQ  us    Discussed with RN 1/4/2024      DVT prophylaxis:  Medical DVT prophylaxis orders are present.    CODE STATUS:   Code Status (Patient has no pulse and is not breathing): CPR (Attempt to Resuscitate)  Medical Interventions (Patient has pulse or is breathing): Full Support      Electronically signed by Aníbal La MD, 01/04/24, 12:37 PM EST.

## 2024-01-04 NOTE — THERAPY TREATMENT NOTE
Acute Care - Physical Therapy Treatment Note   [facility]     Patient Name: Gilberto Sanders  : 1950  MRN: [id number]  Today's Date: 2024      Visit Dx:     ICD-10-CM ICD-9-CM   1. Hypoxia  R09.02 799.02   2. RSV infection  B33.8 079.6   3. Difficulty walking  R26.2 719.7     Patient Active Problem List   Diagnosis    S/P CABG x10 by Dr. Kruger    Coronary heart disease    Hyperlipidemia    Hypertension    Skin disorder    Status post coronary artery stent placement    Status post coronary artery bypass graft    Paroxysmal atrial fibrillation    Sick sinus syndrome    Acute respiratory failure with hypoxia     Past Medical History:   Diagnosis Date    -2023    Coronary artery disease     Hyperlipidemia     Hypertension     Multiple rib fractures     from a fall early spring 2023, healed without intervention    Myocardial infarction     Skin cancer     back, chest, arms (lots of things burned off)     Past Surgical History:   Procedure Laterality Date    APPENDECTOMY      CARDIAC ELECTROPHYSIOLOGY PROCEDURE N/A 2023    Procedure: Ablation atrial fibrillation, RF, rep emailed;  Surgeon: Adrian Valentino MD;  Location: [facility] INVASIVE LOCATION;  Service: Cardiovascular;  Laterality: N/A;    CORONARY ANGIOPLASTY      CORONARY ARTERY BYPASS GRAFT  08/30/2017    X10 Dr Kruger    SKIN CANCER EXCISION       PT Assessment (last 12 hours)       PT Evaluation and Treatment       Row Name 24 1149          Physical Therapy Time and Intention    Subjective Information complains of;fatigue  -DK     Document Type therapy note (daily note)  -DK     Mode of Treatment individual therapy;physical therapy  -DK     Patient Effort good  -DK     Symptoms Noted During/After Treatment fatigue  -DK       Row Name 24 1149          Pain    Pretreatment Pain Rating 0/10 - no pain  -DK     Posttreatment Pain Rating 0/10 - no pain  -DK       Row Name 24 1149          Cognition    Affect/Mental  Status (Cognition) WFL  -DK     Orientation Status (Cognition) oriented x 4  -DK     Follows Commands (Cognition) WFL  -DK     Cognitive Function WFL  -DK     Personal Safety Interventions gait belt;nonskid shoes/slippers when out of bed;supervised activity  -DK       Row Name 01/04/24 1149          Transfers    Transfers sit-stand transfer;stand-sit transfer  -DK       Row Name 01/04/24 1149          Sit-Stand Transfer    Sit-Stand Butler (Transfers) standby assist  -DK     Assistive Device (Sit-Stand Transfers) --  IV pole  -DK       Row Name 01/04/24 1149          Stand-Sit Transfer    Stand-Sit Butler (Transfers) standby assist  -DK     Assistive Device (Stand-Sit Transfers) --  IV pole  -DK       Row Name 01/04/24 1149          Gait/Stairs (Locomotion)    Gait/Stairs Locomotion gait/ambulation independence;gait/ambulation assistive device;distance ambulated;gait pattern  -DK     Butler Level (Gait) standby assist;contact guard;1 person assist  -DK     Assistive Device (Gait) --  IV pole  -DK     Distance in Feet (Gait) 80  -DK     Pattern (Gait) step-through  -DK     Deviations/Abnormal Patterns (Gait) festinating/shuffling  -DK     Bilateral Gait Deviations forward flexed posture  -DK     Comment, (Gait/Stairs) Pt ambulated on room air with the IV pole. No LOB noted.  Pt was left in the recliner on alert post treatment.  -       Row Name 01/04/24 1149          Safety Issues, Functional Mobility    Impairments Affecting Function (Mobility) endurance/activity tolerance;strength  -       Row Name 01/04/24 1149          Balance    Balance Assessment sitting static balance;sitting dynamic balance;standing static balance;standing dynamic balance  -DK     Static Sitting Balance standby assist  -DK     Dynamic Sitting Balance standby assist  -DK     Position, Sitting Balance unsupported;sitting in chair  -DK     Static Standing Balance standby assist  -DK     Dynamic Standing Balance standby  assist;contact guard;1-person assist  -     Position/Device Used, Standing Balance --  IV pole  -     Balance Interventions standing;dynamic;tandem gait  -       Row Name 01/04/24 1149          Motor Skills    Motor Skills --  therapeutic exercises  -     Therapeutic Exercise hip;knee;ankle  -       Row Name 01/04/24 1149          Hip (Therapeutic Exercise)    Hip (Therapeutic Exercise) AROM (active range of motion)  -     Hip AROM (Therapeutic Exercise) bilateral;flexion;extension;aBduction;aDduction;sitting;20 repititions  -       Row Name 01/04/24 1149          Knee (Therapeutic Exercise)    Knee (Therapeutic Exercise) AROM (active range of motion)  -     Knee AROM (Therapeutic Exercise) bilateral;flexion;extension;LAQ (long arc quad);sitting;20 repititions  -       Row Name 01/04/24 1149          Ankle (Therapeutic Exercise)    Ankle (Therapeutic Exercise) AROM (active range of motion)  -     Ankle AROM (Therapeutic Exercise) bilateral;dorsiflexion;plantarflexion;sitting;20 repititions  -       Row Name 01/04/24 1149          Plan of Care Review    Plan of Care Reviewed With patient  -     Progress improving  -       Row Name 01/04/24 1149          Positioning and Restraints    Pre-Treatment Position sitting in chair/recliner  -DK     Post Treatment Position chair  -DK     In Chair reclined;call light within reach;encouraged to call for assist;exit alarm on;legs elevated  -       Row Name 01/04/24 1147          Therapy Assessment/Plan (PT)    Rehab Potential (PT) good, to achieve stated therapy goals  -     Criteria for Skilled Interventions Met (PT) skilled treatment is necessary  -     Therapy Frequency (PT) daily  -     Problem List (PT) problems related to;balance;mobility;strength  -       Row Name 01/04/24 1141          Progress Summary (PT)    Progress Toward Functional Goals (PT) progress toward functional goals is good  -               User Key  (r) = Recorded By,  (t) = Taken By, (c) = Cosigned By      Initials Name Provider Type    Mariana Nicholas, KERI Physical Therapist Assistant                    Physical Therapy Education       Title: PT OT SLP Therapies (In Progress)       Topic: Physical Therapy (In Progress)       Point: Mobility training (Done)       Learning Progress Summary             Patient Acceptance, E,TB, VU by AV at 1/2/2024 1515                         Point: Home exercise program (Not Started)       Learner Progress:  Not documented in this visit.              Point: Body mechanics (Done)       Learning Progress Summary             Patient Acceptance, E,TB, VU by AV at 1/2/2024 1515                         Point: Precautions (Done)       Learning Progress Summary             Patient Acceptance, E,TB, VU by AV at 1/2/2024 1515                                         User Key       Initials Effective Dates Name Provider Type Discipline    AV 06/11/21 -  David Marks, PT Physical Therapist PT                  PT Recommendation and Plan  Planned Therapy Interventions (PT): balance training, bed mobility training, home exercise program, gait training, strengthening, transfer training  Therapy Frequency (PT): daily  Progress Summary (PT)  Progress Toward Functional Goals (PT): progress toward functional goals is good  Plan of Care Reviewed With: patient  Progress: improving   Outcome Measures       Row Name 01/04/24 1149 01/02/24 1500          How much help from another person do you currently need...    Turning from your back to your side while in flat bed without using bedrails? 4  -DK 4  -AV     Moving from lying on back to sitting on the side of a flat bed without bedrails? 4  -DK 3  -AV     Moving to and from a bed to a chair (including a wheelchair)? 3  -DK 3  -AV     Standing up from a chair using your arms (e.g., wheelchair, bedside chair)? 3  -DK 3  -AV     Climbing 3-5 steps with a railing? 3  -DK 3  -AV     To walk in hospital room? 3  -DK 3   -AV     AM-PAC 6 Clicks Score (PT) 20  -DK 19  -AV     Highest Level of Mobility Goal 6 --> Walk 10 steps or more  -DK 6 --> Walk 10 steps or more  -AV        Functional Assessment    Outcome Measure Options AM-PAC 6 Clicks Basic Mobility (PT)  -DK AM-PAC 6 Clicks Basic Mobility (PT)  -AV               User Key  (r) = Recorded By, (t) = Taken By, (c) = Cosigned By      Initials Name Provider Type    Mariana Nicholas PTA Physical Therapist Assistant    AV David Marks, SULTANA Physical Therapist                     Time Calculation:    PT Charges       Row Name 01/04/24 1153             Time Calculation    PT Received On 01/04/24  -DK      PT Goal Re-Cert Due Date 01/11/24  -DK         Timed Charges    93280 - PT Therapeutic Exercise Minutes 12  -DK      54564 - Gait Training Minutes  5  -DK      33683 - PT Therapeutic Activity Minutes 6  -DK         Total Minutes    Timed Charges Total Minutes 23  -DK       Total Minutes 23  -DK                User Key  (r) = Recorded By, (t) = Taken By, (c) = Cosigned By      Initials Name Provider Type    Mariana Nicholas PTA Physical Therapist Assistant                  Therapy Charges for Today       Code Description Service Date Service Provider Modifiers Qty    67986852193 HC PT THER PROC EA 15 MIN 1/4/2024 Mariana Johnson, KERI GP 1    00074204309 HC PT THERAPEUTIC ACT EA 15 MIN 1/4/2024 Mariana Johnson PTA GP 1            PT G-Codes  Outcome Measure Options: AM-PAC 6 Clicks Basic Mobility (PT)  AM-PAC 6 Clicks Score (PT): 20    Mariana Johnson PTA  1/4/2024

## 2024-01-05 ENCOUNTER — APPOINTMENT (OUTPATIENT)
Dept: ULTRASOUND IMAGING | Facility: HOSPITAL | Age: 74
DRG: 202 | End: 2024-01-05
Payer: MEDICARE

## 2024-01-05 LAB
ALBUMIN SERPL-MCNC: 3.1 G/DL (ref 3.5–5.2)
ALBUMIN/GLOB SERPL: 0.8 G/DL
ALP SERPL-CCNC: 105 U/L (ref 39–117)
ALT SERPL W P-5'-P-CCNC: 178 U/L (ref 1–41)
ANION GAP SERPL CALCULATED.3IONS-SCNC: 12.6 MMOL/L (ref 5–15)
AST SERPL-CCNC: 73 U/L (ref 1–40)
BILIRUB SERPL-MCNC: 0.3 MG/DL (ref 0–1.2)
BUN SERPL-MCNC: 47 MG/DL (ref 8–23)
BUN/CREAT SERPL: 26.3 (ref 7–25)
CALCIUM SPEC-SCNC: 9.5 MG/DL (ref 8.6–10.5)
CHLORIDE SERPL-SCNC: 107 MMOL/L (ref 98–107)
CO2 SERPL-SCNC: 18.4 MMOL/L (ref 22–29)
CREAT SERPL-MCNC: 1.79 MG/DL (ref 0.76–1.27)
DEPRECATED RDW RBC AUTO: 51.8 FL (ref 37–54)
EGFRCR SERPLBLD CKD-EPI 2021: 39.5 ML/MIN/1.73
ERYTHROCYTE [DISTWIDTH] IN BLOOD BY AUTOMATED COUNT: 15.6 % (ref 12.3–15.4)
GLOBULIN UR ELPH-MCNC: 3.7 GM/DL
GLUCOSE SERPL-MCNC: 136 MG/DL (ref 65–99)
HBV CORE AB SERPL QL IA: NEGATIVE
HBV SURFACE AB SER QL: REACTIVE
HBV SURFACE AG SERPL QL IA: NEGATIVE
HCT VFR BLD AUTO: 28.1 % (ref 37.5–51)
HCV AB SERPL QL IA: NORMAL
HCV IGG SERPL QL IA: NON REACTIVE
HGB BLD-MCNC: 9 G/DL (ref 13–17.7)
MCH RBC QN AUTO: 29.3 PG (ref 26.6–33)
MCHC RBC AUTO-ENTMCNC: 32 G/DL (ref 31.5–35.7)
MCV RBC AUTO: 91.5 FL (ref 79–97)
PLATELET # BLD AUTO: 393 10*3/MM3 (ref 140–450)
PMV BLD AUTO: 10.3 FL (ref 6–12)
POTASSIUM SERPL-SCNC: 4.6 MMOL/L (ref 3.5–5.2)
PROT SERPL-MCNC: 6.8 G/DL (ref 6–8.5)
RBC # BLD AUTO: 3.07 10*6/MM3 (ref 4.14–5.8)
SODIUM SERPL-SCNC: 138 MMOL/L (ref 136–145)
WBC NRBC COR # BLD AUTO: 16.49 10*3/MM3 (ref 3.4–10.8)

## 2024-01-05 PROCEDURE — 99232 SBSQ HOSP IP/OBS MODERATE 35: CPT | Performed by: INTERNAL MEDICINE

## 2024-01-05 PROCEDURE — 76705 ECHO EXAM OF ABDOMEN: CPT

## 2024-01-05 PROCEDURE — 63710000001 PREDNISONE PER 1 MG: Performed by: INTERNAL MEDICINE

## 2024-01-05 PROCEDURE — 25010000002 HEPARIN (PORCINE) PER 1000 UNITS: Performed by: FAMILY MEDICINE

## 2024-01-05 PROCEDURE — 94799 UNLISTED PULMONARY SVC/PX: CPT

## 2024-01-05 PROCEDURE — 94664 DEMO&/EVAL PT USE INHALER: CPT

## 2024-01-05 PROCEDURE — 85027 COMPLETE CBC AUTOMATED: CPT | Performed by: INTERNAL MEDICINE

## 2024-01-05 PROCEDURE — 80053 COMPREHEN METABOLIC PANEL: CPT | Performed by: INTERNAL MEDICINE

## 2024-01-05 RX ADMIN — DOCUSATE SODIUM 50MG AND SENNOSIDES 8.6MG 2 TABLET: 8.6; 5 TABLET, FILM COATED ORAL at 10:32

## 2024-01-05 RX ADMIN — IPRATROPIUM BROMIDE AND ALBUTEROL SULFATE 3 ML: 2.5; .5 SOLUTION RESPIRATORY (INHALATION) at 08:28

## 2024-01-05 RX ADMIN — HEPARIN SODIUM 5000 UNITS: 5000 INJECTION INTRAVENOUS; SUBCUTANEOUS at 05:43

## 2024-01-05 RX ADMIN — FAMOTIDINE 20 MG: 20 TABLET, FILM COATED ORAL at 17:36

## 2024-01-05 RX ADMIN — IPRATROPIUM BROMIDE AND ALBUTEROL SULFATE 3 ML: 2.5; .5 SOLUTION RESPIRATORY (INHALATION) at 20:50

## 2024-01-05 RX ADMIN — ASPIRIN 81 MG: 81 TABLET, COATED ORAL at 10:32

## 2024-01-05 RX ADMIN — IPRATROPIUM BROMIDE AND ALBUTEROL SULFATE 3 ML: 2.5; .5 SOLUTION RESPIRATORY (INHALATION) at 13:00

## 2024-01-05 RX ADMIN — Medication 10 ML: at 09:15

## 2024-01-05 RX ADMIN — PREDNISONE 40 MG: 20 TABLET ORAL at 10:32

## 2024-01-05 RX ADMIN — HEPARIN SODIUM 5000 UNITS: 5000 INJECTION INTRAVENOUS; SUBCUTANEOUS at 21:24

## 2024-01-05 RX ADMIN — GUAIFENESIN 1200 MG: 600 TABLET ORAL at 21:24

## 2024-01-05 RX ADMIN — HEPARIN SODIUM 5000 UNITS: 5000 INJECTION INTRAVENOUS; SUBCUTANEOUS at 14:39

## 2024-01-05 RX ADMIN — GUAIFENESIN 1200 MG: 600 TABLET ORAL at 10:32

## 2024-01-05 RX ADMIN — ALLOPURINOL 300 MG: 300 TABLET ORAL at 21:24

## 2024-01-05 RX ADMIN — Medication 10 ML: at 21:25

## 2024-01-05 NOTE — PROGRESS NOTES
[facility]   Hospitalist Progress Note  Date: 2024  Patient Name: Gilberto Sanders  : 1950  MRN: [id number]  Date of admission: 2024  Room/Bed: [facility]      Subjective   Subjective     Chief Complaint: SOA    Summary: Gilberto Sanders is a 73 y.o. male who presented to the emergency department with shortness of breath cough chest congestion.  He was found to have RSV bronchiolitis and will be admitted for further evaluation and treatment     Interval Followup: doing better, no soa    Review of Systems    All systems reviewed and negative except for what is outlined above.      Objective   Objective     Vitals:   Temp:  [97.9 °F (36.6 °C)-98.2 °F (36.8 °C)] 98.2 °F (36.8 °C)  Heart Rate:  [67-87] 76  Resp:  [18] 18  BP: (140-160)/(76-90) 140/80    Physical Exam   General: sitting up in bed  HENT: NCAT, MMM  Eyes: pupils equal, no scleral icterus  Cardiovascular: RRR, no murmurs   Pulmonary: dim bs rufino  Gastrointestinal: S/ND/NT, +BS  Musculoskeletal: No gross deformities  Skin: No jaundice, no rash on exposed skin appreciated  Neuro: CN II through XII grossly intact; speech clear; no tremor      Result Review    Result Review:  I have personally reviewed these results 2024    [x]  Laboratory      Lab 24  1700 24  0609 24  0620 24  0414   WBC 16.49* 21.12* 17.66* 12.13*   HEMOGLOBIN 9.0* 8.1* 8.3* 9.8*   HEMATOCRIT 28.1* 25.8* 25.8* 30.7*   PLATELETS 393 355 335 397   NEUTROS ABS  --  17.18* 15.57* 10.46*   IMMATURE GRANS (ABS)  --  0.84* 0.35* 0.20*   LYMPHS ABS  --  1.81 1.12 0.91   MONOS ABS  --  1.25* 0.58 0.46   EOS ABS  --  0.00 0.00 0.03   MCV 91.5 92.8 90.2 91.1         Lab 24  0609 24  0620 24  0414   SODIUM 135* 132* 135*   POTASSIUM 4.1 4.0 4.4   CHLORIDE 109* 103 102   CO2 16.9* 17.7* 19.9*   ANION GAP 9.1 11.3 13.1   BUN 58* 62* 58*   CREATININE 1.70* 1.66* 2.02*   EGFR 42.0* 43.3* 34.2*   GLUCOSE 97 142* 122*   CALCIUM 9.4 9.5 9.7    PHOSPHORUS  --  4.1  --          Lab 01/04/24  0609 01/03/24  0620 01/02/24  0414 01/01/24  1855   TOTAL PROTEIN 6.1 6.9 7.5 8.2   ALBUMIN 2.9* 2.9*  2.9* 3.3* 3.6   GLOBULIN 3.2  --  4.2 4.6   ALT (SGPT) 215* 158* 159* 156*   AST (SGOT) 163* 131* 168* 163*   BILIRUBIN 0.3 0.3 0.7 0.8   BILIRUBIN DIRECT  --  <0.2  --   --    ALK PHOS 130* 120* 123* 128*         Lab 01/02/24  0414 01/01/24  2139 01/01/24  1855   PROBNP  --   --  1,204.0*   HSTROP T 67* 65* 70*             Lab 01/03/24  0620 01/01/24  1855   IRON 33*  --    IRON SATURATION (TSAT) 15*  --    TIBC 221*  --    TRANSFERRIN 148*  --    FOLATE  --  >20.00   VITAMIN B 12  --  1,834*         Brief Urine Lab Results       None          [x]  Microbiology   Microbiology Results (last 10 days)       Procedure Component Value - Date/Time    COVID-19, FLU A/B, RSV PCR 1 HR TAT - Swab, Nasopharynx [[id number]]  (Abnormal) Collected: 01/01/24 1843    Lab Status: Final result Specimen: Swab from Nasopharynx Updated: 01/01/24 1931     COVID19 Not Detected     Influenza A PCR Not Detected     Influenza B PCR Not Detected     RSV, PCR Detected    Narrative:      Fact sheet for providers: https://www.fda.gov/media/481305/download    Fact sheet for patients: https://www.fda.gov/media/513649/download    Test performed by PCR.          [x]  Radiology  US Liver    Result Date: 1/5/2024    Right upper quadrant ultrasound demonstrating tiny gallbladder polyp of no clinical significance.  Increased renal cortical echogenicity suggesting underlying glomerular disease.      LISBETH DAVIS MD       Electronically Signed and Approved By: LISBETH DAVIS MD on 1/05/2024 at 15:24             XR Chest 1 View    Result Date: 1/1/2024    No acute infiltrate is appreciated.     Please note that portions of this note were completed with a voice recognition program.  ALICIA MORALEZ JR, MD       Electronically Signed and Approved By: ALICIA MORALEZ JR, MD on 1/01/2024 at 22:15              []  EKG/Telemetry   []  Cardiology/Vascular   []  Pathology  []  Old records  []  Other:    Assessment & Plan   Assessment / Plan     Assessment:  RSV bronchiolitis  CAITY  Transaminitis  CAD    Plan:  Admitted to Medicine  De-escalated Solu-Medrol to prednisone  Resumed aspirin  Holding hydralazine for now  Trend LFTs  Hold statin  Will get RUQ us    Discussed with RN 1/5/2024      DVT prophylaxis:  Medical DVT prophylaxis orders are present.    CODE STATUS:   Code Status (Patient has no pulse and is not breathing): CPR (Attempt to Resuscitate)  Medical Interventions (Patient has pulse or is breathing): Full Support      Electronically signed by Aníbal La MD, 01/04/24, 12:37 PM EST.

## 2024-01-05 NOTE — PLAN OF CARE
Goal Outcome Evaluation:  Plan of Care Reviewed With: patient        Progress: no change  Outcome Evaluation: Pt remained A&Ox4 this shift. Also, pt remained on room air maintaining stable oxygen saturation. Pt denied pain this shift. Pt underwent US of liver this shift. Pt tolerated well. All other vital signs remained stable.

## 2024-01-05 NOTE — PLAN OF CARE
Goal Outcome Evaluation:  Plan of Care Reviewed With: patient        Progress: no change  Outcome Evaluation: very pleasant patient who is alert and oriented x4 and on room air. no c/o of pain throughout shift. medicated per mar. patient remained NPO throughout shift. AM pepcid held due to NPO status. US of liver scheduled for today. no other issues at this time.

## 2024-01-06 VITALS
SYSTOLIC BLOOD PRESSURE: 159 MMHG | WEIGHT: 164.68 LBS | RESPIRATION RATE: 18 BRPM | BODY MASS INDEX: 24.96 KG/M2 | OXYGEN SATURATION: 98 % | TEMPERATURE: 97.2 F | HEIGHT: 68 IN | HEART RATE: 70 BPM | DIASTOLIC BLOOD PRESSURE: 90 MMHG

## 2024-01-06 LAB
ALBUMIN SERPL-MCNC: 2.9 G/DL (ref 3.5–5.2)
ALBUMIN/GLOB SERPL: 0.8 G/DL
ALP SERPL-CCNC: 99 U/L (ref 39–117)
ALT SERPL W P-5'-P-CCNC: 162 U/L (ref 1–41)
ANION GAP SERPL CALCULATED.3IONS-SCNC: 12.1 MMOL/L (ref 5–15)
ANISOCYTOSIS BLD QL: NORMAL
AST SERPL-CCNC: 57 U/L (ref 1–40)
BASOPHILS # BLD AUTO: 0.05 10*3/MM3 (ref 0–0.2)
BASOPHILS NFR BLD AUTO: 0.3 % (ref 0–1.5)
BILIRUB SERPL-MCNC: 0.3 MG/DL (ref 0–1.2)
BUN SERPL-MCNC: 50 MG/DL (ref 8–23)
BUN/CREAT SERPL: 28.2 (ref 7–25)
BURR CELLS BLD QL SMEAR: NORMAL
C3 FRG RBC-MCNC: NORMAL
CALCIUM SPEC-SCNC: 9.7 MG/DL (ref 8.6–10.5)
CHLORIDE SERPL-SCNC: 107 MMOL/L (ref 98–107)
CO2 SERPL-SCNC: 17.9 MMOL/L (ref 22–29)
CREAT SERPL-MCNC: 1.77 MG/DL (ref 0.76–1.27)
DACRYOCYTES BLD QL SMEAR: NORMAL
DEPRECATED RDW RBC AUTO: 51.4 FL (ref 37–54)
EGFRCR SERPLBLD CKD-EPI 2021: 40.1 ML/MIN/1.73
EOSINOPHIL # BLD AUTO: 0.01 10*3/MM3 (ref 0–0.4)
EOSINOPHIL NFR BLD AUTO: 0.1 % (ref 0.3–6.2)
ERYTHROCYTE [DISTWIDTH] IN BLOOD BY AUTOMATED COUNT: 15.4 % (ref 12.3–15.4)
GLOBULIN UR ELPH-MCNC: 3.6 GM/DL
GLUCOSE SERPL-MCNC: 119 MG/DL (ref 65–99)
HCT VFR BLD AUTO: 26.6 % (ref 37.5–51)
HGB BLD-MCNC: 8.4 G/DL (ref 13–17.7)
IMM GRANULOCYTES # BLD AUTO: 0.93 10*3/MM3 (ref 0–0.05)
IMM GRANULOCYTES NFR BLD AUTO: 5.3 % (ref 0–0.5)
LARGE PLATELETS: NORMAL
LYMPHOCYTES # BLD AUTO: 1.75 10*3/MM3 (ref 0.7–3.1)
LYMPHOCYTES NFR BLD AUTO: 9.9 % (ref 19.6–45.3)
MACROCYTES BLD QL SMEAR: NORMAL
MCH RBC QN AUTO: 29.1 PG (ref 26.6–33)
MCHC RBC AUTO-ENTMCNC: 31.6 G/DL (ref 31.5–35.7)
MCV RBC AUTO: 92 FL (ref 79–97)
MICROCYTES BLD QL: NORMAL
MONOCYTES # BLD AUTO: 0.81 10*3/MM3 (ref 0.1–0.9)
MONOCYTES NFR BLD AUTO: 4.6 % (ref 5–12)
NEUTROPHILS NFR BLD AUTO: 14.07 10*3/MM3 (ref 1.7–7)
NEUTROPHILS NFR BLD AUTO: 79.8 % (ref 42.7–76)
NRBC BLD AUTO-RTO: 0 /100 WBC (ref 0–0.2)
OVALOCYTES BLD QL SMEAR: NORMAL
PLATELET # BLD AUTO: 373 10*3/MM3 (ref 140–450)
PMV BLD AUTO: 10.3 FL (ref 6–12)
POIKILOCYTOSIS BLD QL SMEAR: NORMAL
POTASSIUM SERPL-SCNC: 4.3 MMOL/L (ref 3.5–5.2)
PROT SERPL-MCNC: 6.5 G/DL (ref 6–8.5)
RBC # BLD AUTO: 2.89 10*6/MM3 (ref 4.14–5.8)
SMALL PLATELETS BLD QL SMEAR: ADEQUATE
SODIUM SERPL-SCNC: 137 MMOL/L (ref 136–145)
STOMATOCYTES BLD QL SMEAR: NORMAL
TARGETS BLD QL SMEAR: NORMAL
WBC MORPH BLD: NORMAL
WBC NRBC COR # BLD AUTO: 17.62 10*3/MM3 (ref 3.4–10.8)

## 2024-01-06 PROCEDURE — 80053 COMPREHEN METABOLIC PANEL: CPT | Performed by: INTERNAL MEDICINE

## 2024-01-06 PROCEDURE — 94664 DEMO&/EVAL PT USE INHALER: CPT

## 2024-01-06 PROCEDURE — 99239 HOSP IP/OBS DSCHRG MGMT >30: CPT | Performed by: INTERNAL MEDICINE

## 2024-01-06 PROCEDURE — 94799 UNLISTED PULMONARY SVC/PX: CPT

## 2024-01-06 PROCEDURE — 85025 COMPLETE CBC W/AUTO DIFF WBC: CPT | Performed by: INTERNAL MEDICINE

## 2024-01-06 PROCEDURE — 63710000001 PREDNISONE PER 1 MG: Performed by: INTERNAL MEDICINE

## 2024-01-06 PROCEDURE — 85007 BL SMEAR W/DIFF WBC COUNT: CPT | Performed by: INTERNAL MEDICINE

## 2024-01-06 PROCEDURE — 25010000002 HEPARIN (PORCINE) PER 1000 UNITS: Performed by: FAMILY MEDICINE

## 2024-01-06 RX ORDER — FAMOTIDINE 20 MG/1
20 TABLET, FILM COATED ORAL
Qty: 14 TABLET | Refills: 0 | Status: SHIPPED | OUTPATIENT
Start: 2024-01-06 | End: 2024-01-13

## 2024-01-06 RX ORDER — ATORVASTATIN CALCIUM 40 MG/1
40 TABLET, FILM COATED ORAL NIGHTLY
Start: 2024-01-08

## 2024-01-06 RX ORDER — PREDNISONE 20 MG/1
20 TABLET ORAL DAILY
Qty: 3 TABLET | Refills: 0 | Status: SHIPPED | OUTPATIENT
Start: 2024-01-06 | End: 2024-01-09

## 2024-01-06 RX ORDER — GUAIFENESIN 200 MG/1
400 TABLET ORAL EVERY 6 HOURS PRN
Qty: 30 TABLET | Refills: 0 | Status: SHIPPED | OUTPATIENT
Start: 2024-01-06

## 2024-01-06 RX ORDER — ALBUTEROL SULFATE 90 UG/1
2 AEROSOL, METERED RESPIRATORY (INHALATION) EVERY 4 HOURS PRN
Qty: 8 G | Refills: 0 | Status: SHIPPED | OUTPATIENT
Start: 2024-01-06

## 2024-01-06 RX ORDER — CARVEDILOL 6.25 MG/1
3.12 TABLET ORAL 2 TIMES DAILY WITH MEALS
Qty: 30 TABLET | Refills: 0 | Status: SHIPPED | OUTPATIENT
Start: 2024-01-06 | End: 2024-02-05

## 2024-01-06 RX ADMIN — Medication 10 ML: at 09:01

## 2024-01-06 RX ADMIN — HEPARIN SODIUM 5000 UNITS: 5000 INJECTION INTRAVENOUS; SUBCUTANEOUS at 06:29

## 2024-01-06 RX ADMIN — FAMOTIDINE 20 MG: 20 TABLET, FILM COATED ORAL at 06:30

## 2024-01-06 RX ADMIN — PREDNISONE 40 MG: 20 TABLET ORAL at 09:01

## 2024-01-06 RX ADMIN — IPRATROPIUM BROMIDE AND ALBUTEROL SULFATE 3 ML: 2.5; .5 SOLUTION RESPIRATORY (INHALATION) at 00:13

## 2024-01-06 RX ADMIN — IPRATROPIUM BROMIDE AND ALBUTEROL SULFATE 3 ML: 2.5; .5 SOLUTION RESPIRATORY (INHALATION) at 07:58

## 2024-01-06 RX ADMIN — GUAIFENESIN 1200 MG: 600 TABLET ORAL at 09:01

## 2024-01-06 RX ADMIN — ASPIRIN 81 MG: 81 TABLET, COATED ORAL at 09:01

## 2024-01-06 RX ADMIN — DOCUSATE SODIUM 50MG AND SENNOSIDES 8.6MG 2 TABLET: 8.6; 5 TABLET, FILM COATED ORAL at 09:01

## 2024-01-06 NOTE — DISCHARGE SUMMARY
[facility]         HOSPITALIST  DISCHARGE SUMMARY    Patient Name: Gilberto Sanders  : 1950  MRN: [id number]    Date of Admission: 2024  Date of Discharge:  2023  Primary Care Physician: Amarilys Bear MD  Admitting: Medicine    Final Diagnoses:  RSV bronchiolitis  CAITY  Transaminitis  CAD  Hypertensive heart disease  Mild HFpEF, chronic      Hospital Course     Hospital Course:  Gilberto Sanders is a 73 y.o. male who presented to the emergency department with shortness of breath cough chest congestion.  He was found to have RSV bronchiolitis and was admitted for further evaluation and treatment. He has clinically improved with steroids and nebulizers.  He had some mild transaminitis that has trended down without any workup, U/S without acute findings.  Did suggest holding his statin a few days but I suspect this could have been from viral illness.  Does appear to have some CKD, will refer him to nephrology as outpatient.  Also has moved [facility] and needs a local cardiologist so will refer him to cardiology.      DISCHARGE Follow Up Recommendations for labs and diagnostics: Recommend CMP in a week or so with PCP      Day of Discharge     Vital Signs:  Temp:  [97.2 °F (36.2 °C)-98.2 °F (36.8 °C)] 97.2 °F (36.2 °C)  Heart Rate:  [53-76] 70  Resp:  [16-18] 18  BP: (130-159)/(74-90) 159/90  Physical Exam: nad s1s2 chest clear      Discharge Details        Discharge Medications        New Medications        Instructions Start Date   carvedilol 6.25 MG tablet  Commonly known as: Coreg   3.125 mg, Oral, 2 Times Daily With Meals      famotidine 20 MG tablet  Commonly known as: PEPCID   20 mg, Oral, 2 Times Daily Before Meals      guaiFENesin 200 MG tablet   400 mg, Oral, Every 6 Hours PRN      predniSONE 20 MG tablet  Commonly known as: DELTASONE   20 mg, Oral, Daily      Ventolin  (90 Base) MCG/ACT inhaler  Generic drug: albuterol sulfate HFA   2 puffs, Inhalation, Every 4 Hours  PRN             Changes to Medications        Instructions Start Date   atorvastatin 40 MG tablet  Commonly known as: LIPITOR  What changed: These instructions start on January 8, 2024. If you are unsure what to do until then, ask your doctor or other care provider.   40 mg, Oral, Nightly   Start Date: January 8, 2024            Continue These Medications        Instructions Start Date   allopurinol 300 MG tablet  Commonly known as: ZYLOPRIM   300 mg, Oral, Nightly      aspirin 81 MG EC tablet   Take 1 tablet by mouth Every Night.               No Known Allergies    Discharge Disposition:  Home or Self Care    Diet:  Hospital:  Diet Order   Procedures    Diet: Cardiac Diets; Healthy Heart (2-3 Na+); Texture: Regular Texture (IDDSI 7); Fluid Consistency: Thin (IDDSI 0)       Discharge Activity:       CODE STATUS:  Code Status and Medical Interventions:   Ordered at: 01/01/24 5717     Code Status (Patient has no pulse and is not breathing):    CPR (Attempt to Resuscitate)     Medical Interventions (Patient has pulse or is breathing):    Full Support         Future Appointments   Date Time Provider Department Center   2/6/2024  1:15 PM Adrian Valentino MD [facility]   4/25/2024  1:50 PM Morris Sanz MD [facility]           Pertinent  and/or Most Recent Results     PROCEDURES:   none    LAB RESULTS:      Lab 01/06/24  0522 01/05/24  1700 01/04/24  0609 01/03/24  0620 01/02/24  0414 01/01/24  1855   WBC 17.62* 16.49* 21.12* 17.66* 12.13* 13.09*   HEMOGLOBIN 8.4* 9.0* 8.1* 8.3* 9.8* 10.1*   HEMATOCRIT 26.6* 28.1* 25.8* 25.8* 30.7* 31.8*   PLATELETS 373 393 355 335 397 419   NEUTROS ABS 14.07*  --  17.18* 15.57* 10.46* 10.68*   IMMATURE GRANS (ABS) 0.93*  --  0.84* 0.35* 0.20* 0.20*   LYMPHS ABS 1.75  --  1.81 1.12 0.91 1.26   MONOS ABS 0.81  --  1.25* 0.58 0.46 0.83   EOS ABS 0.01  --  0.00 0.00 0.03 0.04   MCV 92.0 91.5 92.8 90.2 91.1 91.1         Lab 01/06/24  0522 01/05/24  1700  01/04/24  0609 01/03/24 0620 01/02/24 0414   SODIUM 137 138 135* 132* 135*   POTASSIUM 4.3 4.6 4.1 4.0 4.4   CHLORIDE 107 107 109* 103 102   CO2 17.9* 18.4* 16.9* 17.7* 19.9*   ANION GAP 12.1 12.6 9.1 11.3 13.1   BUN 50* 47* 58* 62* 58*   CREATININE 1.77* 1.79* 1.70* 1.66* 2.02*   EGFR 40.1* 39.5* 42.0* 43.3* 34.2*   GLUCOSE 119* 136* 97 142* 122*   CALCIUM 9.7 9.5 9.4 9.5 9.7   PHOSPHORUS  --   --   --  4.1  --          Lab 01/06/24  0522 01/05/24  1700 01/04/24  0609 01/03/24 0620 01/02/24 0414 01/01/24  1855   TOTAL PROTEIN 6.5 6.8 6.1 6.9 7.5 8.2   ALBUMIN 2.9* 3.1* 2.9* 2.9*  2.9* 3.3* 3.6   GLOBULIN 3.6 3.7 3.2  --  4.2 4.6   ALT (SGPT) 162* 178* 215* 158* 159* 156*   AST (SGOT) 57* 73* 163* 131* 168* 163*   BILIRUBIN 0.3 0.3 0.3 0.3 0.7 0.8   BILIRUBIN DIRECT  --   --   --  <0.2  --   --    ALK PHOS 99 105 130* 120* 123* 128*         Lab 01/02/24  0414 01/01/24 2139 01/01/24  1855   PROBNP  --   --  1,204.0*   HSTROP T 67* 65* 70*             Lab 01/03/24  0620 01/01/24  1855   IRON 33*  --    IRON SATURATION (TSAT) 15*  --    TIBC 221*  --    TRANSFERRIN 148*  --    FOLATE  --  >20.00   VITAMIN B 12  --  1,834*         Brief Urine Lab Results       None          Microbiology Results (last 10 days)       Procedure Component Value - Date/Time    COVID-19, FLU A/B, RSV PCR 1 HR TAT - Swab, Nasopharynx [[id number]]  (Abnormal) Collected: 01/01/24 1843    Lab Status: Final result Specimen: Swab from Nasopharynx Updated: 01/01/24 1931     COVID19 Not Detected     Influenza A PCR Not Detected     Influenza B PCR Not Detected     RSV, PCR Detected    Narrative:      Fact sheet for providers: https://www.fda.gov/media/606997/download    Fact sheet for patients: https://www.fda.gov/media/048266/download    Test performed by PCR.            US Liver    Result Date: 1/5/2024    Right upper quadrant ultrasound demonstrating tiny gallbladder polyp of no clinical significance.  Increased renal cortical echogenicity  suggesting underlying glomerular disease.      LISBETH DAVIS MD       Electronically Signed and Approved By: LISBETH DAVIS MD on 1/05/2024 at 15:24             XR Chest 1 View    Result Date: 1/1/2024    No acute infiltrate is appreciated.     Please note that portions of this note were completed with a voice recognition program.  ALICIA MORALEZ JR, MD       Electronically Signed and Approved By: ALICIA MORALEZ JR, MD on 1/01/2024 at 22:15                        Results for orders placed during the hospital encounter of 01/01/24    Adult Transthoracic Echo Complete W/ Cont if Necessary Per Protocol    Interpretation Summary    Left ventricular systolic function is low normal. Calculated left ventricular EF = 46.5%    Left ventricular wall thickness is consistent with borderline eccentric hypertrophy.    Left ventricular diastolic function is consistent with (grade I) impaired relaxation.      Labs Pending at Discharge:        Time spent on Discharge including face to face service:  ~35 minutes    Electronically signed by Aníbal La MD, 01/06/24, 1:43 PM EST.

## 2024-01-06 NOTE — PLAN OF CARE
"Goal Outcome Evaluation:  Plan of Care Reviewed With: patient        Progress: no change  Outcome Evaluation: patient is alert and oriented x4 and on room air. no c/o pain throughout shift. medicated per mar. patient appeared frustrated during shift stating \"I hope I can go home tomorrow or sometime soon.\" no other issues at this time.         "

## 2024-01-06 NOTE — PLAN OF CARE
Goal Outcome Evaluation:                   Patient remains alert and oriented x4 on room air. Discharge paperwork addressed with both the patient and his daughter. No questions or concerns at this time. Patient discharged home with daughter via personal vehicle.

## 2024-01-07 ENCOUNTER — READMISSION MANAGEMENT (OUTPATIENT)
Dept: CALL CENTER | Facility: HOSPITAL | Age: 74
End: 2024-01-07
Payer: MEDICARE

## 2024-01-07 NOTE — OUTREACH NOTE
Prep Survey      Flowsheet Row Responses   [facility] facility patient discharged from? [facility]   Is LACE score < 7 ? No   Eligibility Readm Mgmt   Discharge diagnosis RSV bronchiolitis   Does the patient have one of the following disease processes/diagnoses(primary or secondary)? Other   Does the patient have Home health ordered? No   Is there a DME ordered? No   Prep survey completed? Yes            HERBIE JACOBSON - Registered Nurse

## 2024-01-10 ENCOUNTER — TELEPHONE (OUTPATIENT)
Dept: CARDIOLOGY | Facility: CLINIC | Age: 74
End: 2024-01-10

## 2024-01-10 NOTE — TELEPHONE ENCOUNTER
Caller: RAFAELA    Relationship: DAUGHTER    Best call back number: [phone]    Who is your current provider: DR. GALVEZ  AND DR. DE JESUS IN THE [facility]    Is your current provider offboarding? NO    Who would you like your new provider to be: SHERWIN VERMA    What are your reasons for transferring care: PATIENT HAS MOVED TO THE [facility] AND WAS RECENTLY HOSPITALIZED. PATIENT IS SUPPOSED TO FOLLOW UP WITH 1 WEEK. PLEASE CALL PATIENT'S DAUGHTER    Additional notes:

## 2024-01-11 ENCOUNTER — READMISSION MANAGEMENT (OUTPATIENT)
Dept: CALL CENTER | Facility: HOSPITAL | Age: 74
End: 2024-01-11
Payer: MEDICARE

## 2024-01-11 NOTE — OUTREACH NOTE
Medical Week 1 Survey      Flowsheet Row Responses   [facility] patient discharged from? [facility]   Does the patient have one of the following disease processes/diagnoses(primary or secondary)? Other   Week 1 attempt successful? No   Unsuccessful attempts Attempt 1            Teresa CALLAHAN - Licensed Nurse

## 2024-01-12 ENCOUNTER — LAB (OUTPATIENT)
Dept: LAB | Facility: HOSPITAL | Age: 74
End: 2024-01-12
Payer: MEDICARE

## 2024-01-12 ENCOUNTER — OFFICE VISIT (OUTPATIENT)
Dept: FAMILY MEDICINE CLINIC | Facility: CLINIC | Age: 74
End: 2024-01-12
Payer: MEDICARE

## 2024-01-12 VITALS
BODY MASS INDEX: 25.7 KG/M2 | SYSTOLIC BLOOD PRESSURE: 110 MMHG | WEIGHT: 169.6 LBS | HEIGHT: 68 IN | DIASTOLIC BLOOD PRESSURE: 75 MMHG | HEART RATE: 83 BPM | TEMPERATURE: 98 F | OXYGEN SATURATION: 96 %

## 2024-01-12 DIAGNOSIS — Z09 HOSPITAL DISCHARGE FOLLOW-UP: ICD-10-CM

## 2024-01-12 DIAGNOSIS — Z00.00 MEDICARE ANNUAL WELLNESS VISIT, SUBSEQUENT: ICD-10-CM

## 2024-01-12 DIAGNOSIS — I48.0 PAROXYSMAL ATRIAL FIBRILLATION: ICD-10-CM

## 2024-01-12 DIAGNOSIS — B33.8 RSV INFECTION: ICD-10-CM

## 2024-01-12 DIAGNOSIS — H66.92 LEFT OTITIS MEDIA, UNSPECIFIED OTITIS MEDIA TYPE: ICD-10-CM

## 2024-01-12 DIAGNOSIS — Z76.89 ESTABLISHING CARE WITH NEW DOCTOR, ENCOUNTER FOR: Primary | ICD-10-CM

## 2024-01-12 DIAGNOSIS — K21.00 GASTROESOPHAGEAL REFLUX DISEASE WITH ESOPHAGITIS WITHOUT HEMORRHAGE: ICD-10-CM

## 2024-01-12 LAB
ALBUMIN SERPL-MCNC: 3.9 G/DL (ref 3.5–5.2)
ALBUMIN/GLOB SERPL: 1.1 G/DL
ALP SERPL-CCNC: 92 U/L (ref 39–117)
ALT SERPL W P-5'-P-CCNC: 69 U/L (ref 1–41)
ANION GAP SERPL CALCULATED.3IONS-SCNC: 16 MMOL/L (ref 5–15)
AST SERPL-CCNC: 24 U/L (ref 1–40)
BASOPHILS # BLD AUTO: 0.04 10*3/MM3 (ref 0–0.2)
BASOPHILS NFR BLD AUTO: 0.3 % (ref 0–1.5)
BILIRUB SERPL-MCNC: 0.3 MG/DL (ref 0–1.2)
BUN SERPL-MCNC: 43 MG/DL (ref 8–23)
BUN/CREAT SERPL: 20.5 (ref 7–25)
CALCIUM SPEC-SCNC: 10.4 MG/DL (ref 8.6–10.5)
CHLORIDE SERPL-SCNC: 103 MMOL/L (ref 98–107)
CO2 SERPL-SCNC: 21 MMOL/L (ref 22–29)
CREAT SERPL-MCNC: 2.1 MG/DL (ref 0.76–1.27)
DEPRECATED RDW RBC AUTO: 48.3 FL (ref 37–54)
EGFRCR SERPLBLD CKD-EPI 2021: 32.6 ML/MIN/1.73
EOSINOPHIL # BLD AUTO: 0.15 10*3/MM3 (ref 0–0.4)
EOSINOPHIL NFR BLD AUTO: 1.1 % (ref 0.3–6.2)
ERYTHROCYTE [DISTWIDTH] IN BLOOD BY AUTOMATED COUNT: 15 % (ref 12.3–15.4)
GLOBULIN UR ELPH-MCNC: 3.5 GM/DL
GLUCOSE SERPL-MCNC: 96 MG/DL (ref 65–99)
HCT VFR BLD AUTO: 34.6 % (ref 37.5–51)
HGB BLD-MCNC: 11.2 G/DL (ref 13–17.7)
IMM GRANULOCYTES # BLD AUTO: 0.18 10*3/MM3 (ref 0–0.05)
IMM GRANULOCYTES NFR BLD AUTO: 1.3 % (ref 0–0.5)
LYMPHOCYTES # BLD AUTO: 1.77 10*3/MM3 (ref 0.7–3.1)
LYMPHOCYTES NFR BLD AUTO: 12.9 % (ref 19.6–45.3)
MCH RBC QN AUTO: 28.8 PG (ref 26.6–33)
MCHC RBC AUTO-ENTMCNC: 32.4 G/DL (ref 31.5–35.7)
MCV RBC AUTO: 88.9 FL (ref 79–97)
MONOCYTES # BLD AUTO: 0.82 10*3/MM3 (ref 0.1–0.9)
MONOCYTES NFR BLD AUTO: 6 % (ref 5–12)
NEUTROPHILS NFR BLD AUTO: 10.81 10*3/MM3 (ref 1.7–7)
NEUTROPHILS NFR BLD AUTO: 78.4 % (ref 42.7–76)
NRBC BLD AUTO-RTO: 0 /100 WBC (ref 0–0.2)
PLATELET # BLD AUTO: 406 10*3/MM3 (ref 140–450)
PMV BLD AUTO: 11.2 FL (ref 6–12)
POTASSIUM SERPL-SCNC: 4.9 MMOL/L (ref 3.5–5.2)
PROT SERPL-MCNC: 7.4 G/DL (ref 6–8.5)
RBC # BLD AUTO: 3.89 10*6/MM3 (ref 4.14–5.8)
SODIUM SERPL-SCNC: 140 MMOL/L (ref 136–145)
WBC NRBC COR # BLD AUTO: 13.77 10*3/MM3 (ref 3.4–10.8)

## 2024-01-12 PROCEDURE — 36415 COLL VENOUS BLD VENIPUNCTURE: CPT

## 2024-01-12 PROCEDURE — 80053 COMPREHEN METABOLIC PANEL: CPT

## 2024-01-12 PROCEDURE — 85025 COMPLETE CBC W/AUTO DIFF WBC: CPT

## 2024-01-12 RX ORDER — AMOXICILLIN AND CLAVULANATE POTASSIUM 875; 125 MG/1; MG/1
1 TABLET, FILM COATED ORAL 2 TIMES DAILY
Qty: 14 TABLET | Refills: 0 | Status: SHIPPED | OUTPATIENT
Start: 2024-01-12 | End: 2024-01-19

## 2024-01-12 RX ORDER — FAMOTIDINE 20 MG/1
20 TABLET, FILM COATED ORAL
Qty: 60 TABLET | Refills: 0 | Status: SHIPPED | OUTPATIENT
Start: 2024-01-12 | End: 2024-02-11

## 2024-01-12 RX ORDER — GUAIFENESIN 200 MG/1
400 TABLET ORAL EVERY 6 HOURS PRN
Qty: 30 TABLET | Refills: 0 | Status: SHIPPED | OUTPATIENT
Start: 2024-01-12

## 2024-01-12 RX ORDER — CARVEDILOL 6.25 MG/1
3.12 TABLET ORAL 2 TIMES DAILY WITH MEALS
Qty: 30 TABLET | Refills: 0 | Status: SHIPPED | OUTPATIENT
Start: 2024-01-12 | End: 2024-02-11

## 2024-01-12 NOTE — PROGRESS NOTES
The ABCs of the Annual Wellness Visit  Subsequent Medicare Wellness Visit    Subjective    Gilberto Sanders is a 73 y.o. male who presents for a Subsequent Medicare Wellness Visit.    The following portions of the patient's history were reviewed and   updated as appropriate: allergies, current medications, past family history, past medical history, past social history, past surgical history, and problem list.    Compared to one year ago, the patient feels his physical   health is worse.    Compared to one year ago, the patient feels his mental   health is the same.    Recent Hospitalizations:  This patient has had a [facility] admission record on file within the last 365 days.    Current Medical Providers:  Patient Care Team:  Marianne Chandra APRN as PCP - General (Family Medicine)  Morris Sanz MD as Consulting Physician (Interventional Cardiology)  Adrian Valentino MD as Consulting Physician (Cardiology)    Outpatient Medications Prior to Visit   Medication Sig Dispense Refill    albuterol sulfate  (90 Base) MCG/ACT inhaler Inhale 2 puffs Every 4 (Four) Hours As Needed for Wheezing or Shortness of Air. 8 g 0    allopurinol (ZYLOPRIM) 300 MG tablet Take 1 tablet by mouth Every Night.      aspirin 81 MG EC tablet Take 1 tablet by mouth Every Night.  0    atorvastatin (LIPITOR) 40 MG tablet Take 1 tablet by mouth Every Night.      carvedilol (Coreg) 6.25 MG tablet Take 0.5 tablets by mouth 2 (Two) Times a Day With Meals for 30 days. 30 tablet 0    famotidine (PEPCID) 20 MG tablet Take 1 tablet by mouth 2 (Two) Times a Day Before Meals for 7 days. 14 tablet 0    guaiFENesin 200 MG tablet Take 2 tablets by mouth Every 6 (Six) Hours As Needed for Cough. 30 tablet 0     No facility-administered medications prior to visit.       No opioid medication identified on active medication list. I have reviewed chart for other potential  high risk medication/s and harmful drug interactions in the  "elderly.        Aspirin is on active medication list. Aspirin use is indicated based on review of current medical condition/s. Pros and cons of this therapy have been discussed today. Benefits of this medication outweigh potential harm.  Patient has been encouraged to continue taking this medication.  .      Patient Active Problem List   Diagnosis    S/P CABG x10 by Dr. Kruger    Coronary heart disease    Hyperlipidemia    Hypertension    Skin disorder    Status post coronary artery stent placement    Status post coronary artery bypass graft    Paroxysmal atrial fibrillation    Sick sinus syndrome    Acute respiratory failure with hypoxia     Advance Care Planning   Advance Care Planning     Advance Directive is not on file.  ACP discussion was declined by the patient. Patient does not have an advance directive, declines further assistance.     Objective    Vitals:    24 0918   BP: 110/75   BP Location: Left arm   Patient Position: Sitting   Pulse: 83   Temp: 98 °F (36.7 °C)   TempSrc: Infrared   SpO2: 96%   Weight: 76.9 kg (169 lb 9.6 oz)   Height: 171.5 cm (67.5\")     Estimated body mass index is 26.17 kg/m² as calculated from the following:    Height as of this encounter: 171.5 cm (67.5\").    Weight as of this encounter: 76.9 kg (169 lb 9.6 oz).           Does the patient have evidence of cognitive impairment? No          HEALTH RISK ASSESSMENT    Smoking Status:  Social History     Tobacco Use   Smoking Status Never    Passive exposure: Never   Smokeless Tobacco Never     Alcohol Consumption:  Social History     Substance and Sexual Activity   Alcohol Use No     Fall Risk Screen:    VINADI Fall Risk Assessment has not been completed.    Depression Screenin/12/2024     9:19 AM   PHQ-2/PHQ-9 Depression Screening   Little Interest or Pleasure in Doing Things 0-->not at all   Feeling Down, Depressed or Hopeless 0-->not at all   PHQ-9: Brief Depression Severity Measure Score 0       Health Habits and " Functional and Cognitive Screenin/12/2024     9:00 AM   Functional & Cognitive Status   Do you have difficulty preparing food and eating? No   Do you have difficulty bathing yourself, getting dressed or grooming yourself? No   Do you have difficulty using the toilet? No   Do you have difficulty moving around from place to place? No   Do you have trouble with steps or getting out of a bed or a chair? No   Current Diet Well Balanced Diet   Dental Exam Not up to date   Eye Exam Not up to date   Exercise (times per week) 5 times per week   Current Exercises Include Walking   Do you need help using the phone?  No   Are you deaf or do you have serious difficulty hearing?  Yes   Do you need help to go to places out of walking distance? No   Do you need help shopping? No   Do you need help preparing meals?  No   Do you need help with housework?  No   Do you need help with laundry? No   Do you need help taking your medications? No   Do you need help managing money? No   Do you ever drive or ride in a car without wearing a seat belt? No   Have you felt unusual stress, anger or loneliness in the last month? No   Who do you live with? Alone   If you need help, do you have trouble finding someone available to you? No   Have you been bothered in the last four weeks by sexual problems? No   Do you have difficulty concentrating, remembering or making decisions? No       Age-appropriate Screening Schedule:  Refer to the list below for future screening recommendations based on patient's age, sex and/or medical conditions. Orders for these recommended tests are listed in the plan section. The patient has been provided with a written plan.    Health Maintenance   Topic Date Due    HEPATITIS C SCREENING  Never done    LIPID PANEL  10/11/2019    TDAP/TD VACCINES (1 - Tdap) 2024 (Originally 3/3/1969)    COVID-19 Vaccine (1) 2024 (Originally 1950)    Pneumococcal Vaccine 65+ (2 of 2 - PPSV23 or PCV20) 2024  (Originally 12/6/2018)    ZOSTER VACCINE (1 of 2) 01/29/2024 (Originally 3/3/2000)    INFLUENZA VACCINE  03/18/2024 (Originally 8/1/2023)    ANNUAL WELLNESS VISIT  01/12/2025    BMI FOLLOWUP  01/12/2025    COLORECTAL CANCER SCREENING  06/28/2028                  CMS Preventative Services Quick Reference  Risk Factors Identified During Encounter  None Identified  Hearing Problem:  ear infection noted  Immunizations Discussed/Encouraged: Influenza and Pneumococcal 23  The above risks/problems have been discussed with the patient.  Pertinent information has been shared with the patient in the After Visit Summary.  An After Visit Summary and PPPS were made available to the patient.    Follow Up:   Next Medicare Wellness visit to be scheduled in 1 year.       Additional E&M Note during same encounter follows:  Patient has multiple medical problems which are significant and separately identifiable that require additional work above and beyond the Medicare Wellness Visit.      Chief Complaint  Establish Care (He was going to Dr. Amarilys Bear in [place], states he moved here two years ago. [facility] to the [facility] for RSV. ), Hearing Problem (Having a difficult time hearing and has gotten worse since he took his shower this morning. His daughter states his ears have been clogged since he had RSV. ), and Medicare Wellness-subsequent    Subjective        Gilberto is here today to be seen to establish care. He was diagnosed with RSV on 1/1 and was in the hospital until 1/6. He is doing better now but still has a lot of sputum  he is coughing up. He would like to get a refill on the mucinex he has been taking. He is also having some hearing loss YUDY. He tried to use peroxide to help but it is worse today. He has a history of a fib rvr that he is on carvedilol for and has had a CABG done.                Objective   Vital Signs:  /75 (BP Location: Left arm, Patient Position: Sitting)   Pulse 83   Temp 98  "°F (36.7 °C) (Infrared)   Ht 171.5 cm (67.5\")   Wt 76.9 kg (169 lb 9.6 oz)   SpO2 96%   BMI 26.17 kg/m²     Physical Exam  Constitutional:       Appearance: Normal appearance.   HENT:      Nose: Nose normal.      Mouth/Throat:      Mouth: Mucous membranes are moist.   Cardiovascular:      Rate and Rhythm: Normal rate and regular rhythm.      Pulses: Normal pulses.      Heart sounds: Normal heart sounds.   Pulmonary:      Effort: Pulmonary effort is normal.      Breath sounds: Normal breath sounds.   Skin:     General: Skin is warm and dry.   Neurological:      General: No focal deficit present.      Mental Status: He is alert and oriented to person, place, and time.   Psychiatric:         Mood and Affect: Mood normal.         Behavior: Behavior normal.                         Assessment and Plan   Diagnoses and all orders for this visit:    1. Establishing care with new doctor, encounter for (Primary)    2. Hospital discharge follow-up  -     Comprehensive metabolic panel; Future  -     CBC w AUTO Differential; Future    3. Medicare annual wellness visit, subsequent    4. RSV infection  -     guaiFENesin 200 MG tablet; Take 2 tablets by mouth Every 6 (Six) Hours As Needed for Cough.  Dispense: 30 tablet; Refill: 0  -     Comprehensive metabolic panel; Future  -     CBC w AUTO Differential; Future    5. Paroxysmal atrial fibrillation  -     carvedilol (Coreg) 6.25 MG tablet; Take 0.5 tablets by mouth 2 (Two) Times a Day With Meals for 30 days.  Dispense: 30 tablet; Refill: 0    6. Left otitis media, unspecified otitis media type  -     amoxicillin-clavulanate (AUGMENTIN) 875-125 MG per tablet; Take 1 tablet by mouth 2 (Two) Times a Day for 7 days.  Dispense: 14 tablet; Refill: 0    7. Gastroesophageal reflux disease with esophagitis without hemorrhage  -     famotidine (PEPCID) 20 MG tablet; Take 1 tablet by mouth 2 (Two) Times a Day Before Meals for 30 days.  Dispense: 60 tablet; Refill: 0             Follow Up "   Return in about 3 months (around 4/12/2024).  Patient was given instructions and counseling regarding his condition or for health maintenance advice. Please see specific information pulled into the AVS if appropriate.

## 2024-01-12 NOTE — PROGRESS NOTES
Chief Complaint   Patient presents with    Establish Care     He was going to Dr. Amarilys Bear in [place], states he moved here two years ago. Was in the [facility] to the 6th for RSV.     Hearing Problem     Having a difficult time hearing and has gotten worse since he took his shower this morning. His daughter states his ears have been clogged since he had RSV.     Medicare Wellness-subsequent       Subjective     {Problem List  Visit Diagnosis   Encounters  Notes  Medications  Labs  Result Review Imaging  Media :23}     Gilberto Sanders presents to [facility]    History of Present Illness    Past History:  Medical History: has a past medical history of A-fib (2023), Coronary artery disease, Hyperlipidemia, Hypertension, Multiple rib fractures (2023), Myocardial infarction, and Skin cancer.   Surgical History: has a past surgical history that includes Coronary artery bypass graft (08/30/2017); Coronary angioplasty; Appendectomy; Skin cancer excision; and Cardiac electrophysiology procedure (N/A, 6/30/2023).   Family History: family history includes Cancer in his mother; Heart disease in his father; Stroke in his father.   Social History: reports that he has never smoked. He has never been exposed to tobacco smoke. He has never used smokeless tobacco. He reports that he does not drink alcohol and does not use drugs.  Allergies: Patient has no known allergies.  (Not in a hospital admission)       Social History     Socioeconomic History    Marital status:    Tobacco Use    Smoking status: Never     Passive exposure: Never    Smokeless tobacco: Never   Vaping Use    Vaping Use: Never used   Substance and Sexual Activity    Alcohol use: No    Drug use: No    Sexual activity: Defer       Health Maintenance Due   Topic Date Due    HEPATITIS C SCREENING  Never done    LIPID PANEL  10/11/2019       Objective     Vital Signs:   /75 (BP Location: Left arm,  "Patient Position: Sitting)   Pulse 83   Temp 98 °F (36.7 °C) (Infrared)   Ht 171.5 cm (67.5\")   Wt 76.9 kg (169 lb 9.6 oz)   SpO2 96%   BMI 26.17 kg/m²       Physical Exam     Review of Systems     Result Review :{Labs  Result Review  Imaging  Med Tab  Media  Procedures :23}   {The following data was reviewed by (Optional):02337}  {Ambulatory Labs (Optional):50652}  {Data reviewed (Optional):11172:::1}          Assessment and Plan {CC Problem List  Visit Diagnosis   ROS  Review (Popup)  [facility] Maintenance  Quality  BestPractice  Medications  SmartSets  SnapShot Encounters  Media :23}   Diagnoses and all orders for this visit:    1. Establishing care with new doctor, encounter for (Primary)    2. Hospital discharge follow-up    3. Medicare annual wellness visit, subsequent    4. RSV infection  -     guaiFENesin 200 MG tablet; Take 2 tablets by mouth Every 6 (Six) Hours As Needed for Cough.  Dispense: 30 tablet; Refill: 0    5. Paroxysmal atrial fibrillation  -     carvedilol (Coreg) 6.25 MG tablet; Take 0.5 tablets by mouth 2 (Two) Times a Day With Meals for 30 days.  Dispense: 30 tablet; Refill: 0    6. Left otitis media, unspecified otitis media type  -     amoxicillin-clavulanate (AUGMENTIN) 875-125 MG per tablet; Take 1 tablet by mouth 2 (Two) Times a Day for 7 days.  Dispense: 14 tablet; Refill: 0    7. Gastroesophageal reflux disease with esophagitis without hemorrhage  -     famotidine (PEPCID) 20 MG tablet; Take 1 tablet by mouth 2 (Two) Times a Day Before Meals for 30 days.  Dispense: 60 tablet; Refill: 0      {Time Spent (Optional):91047}    Pt thought to be clinically stable at this time.    Follow Up {Instructions Charge Capture  Follow-up Communications :23}  No follow-ups on file.  Patient was given instructions and counseling regarding his condition or for health maintenance advice. Please see specific information pulled into the AVS if appropriate.       "

## 2024-01-17 ENCOUNTER — READMISSION MANAGEMENT (OUTPATIENT)
Dept: CALL CENTER | Facility: HOSPITAL | Age: 74
End: 2024-01-17
Payer: MEDICARE

## 2024-01-17 NOTE — OUTREACH NOTE
Medical Week 1 Survey      Flowsheet Row Responses   [facility] patient discharged from? [facility]   Does the patient have one of the following disease processes/diagnoses(primary or secondary)? Other   Week 1 attempt successful? Yes   Call start time 1005   Call end time 1010   Discharge diagnosis RSV bronchiolitis   Person spoke with today (if not patient) and relationship daniel Cosme   Meds reviewed with patient/caregiver? Yes   Is the patient having any side effects they believe may be caused by any medication additions or changes? No   Does the patient have all medications ordered at discharge? Yes   Is the patient taking all medications as directed (includes completed medication regime)? Yes   Medication comments Another atb since discharge for treatment of OM   Comments regarding appointments Cardiology and Nephrology appts both scheduled   Does the patient have a primary care provider?  Yes   Does the patient have an appointment with their PCP within 7 days of discharge? Yes  [1/12/24]   Has the patient kept scheduled appointments due by today? Yes   Has home health visited the patient within 72 hours of discharge? N/A   Psychosocial issues? No   Did the patient receive a copy of their discharge instructions? Yes   Nursing interventions Reviewed instructions with patient   What is the patient's perception of their health status since discharge? Improving  [Dtr reports that pt s/s have improved but still has residual cough, diagnosed with OM since discharge and prescribed ATB for tx. Dtr has no questions today.]   Is the patient/caregiver able to teach back signs and symptoms related to disease process for when to call PCP? Yes   Week 1 call completed? Yes   Call end time 1010            DOMINGO MOY - Registered Nurse

## 2024-01-20 LAB
QT INTERVAL: 376 MS
QTC INTERVAL: 453 MS

## 2024-01-24 ENCOUNTER — ON CAMPUS - OUTPATIENT (AMBULATORY)
Dept: URBAN - METROPOLITAN AREA HOSPITAL 2 | Facility: HOSPITAL | Age: 74
End: 2024-01-24
Payer: COMMERCIAL

## 2024-01-24 ENCOUNTER — OFFICE (AMBULATORY)
Dept: URBAN - METROPOLITAN AREA PATHOLOGY 4 | Facility: PATHOLOGY | Age: 74
End: 2024-01-24
Payer: COMMERCIAL

## 2024-01-24 ENCOUNTER — READMISSION MANAGEMENT (OUTPATIENT)
Dept: CALL CENTER | Facility: HOSPITAL | Age: 74
End: 2024-01-24
Payer: MEDICARE

## 2024-01-24 VITALS
SYSTOLIC BLOOD PRESSURE: 121 MMHG | HEART RATE: 78 BPM | OXYGEN SATURATION: 97 % | DIASTOLIC BLOOD PRESSURE: 67 MMHG | DIASTOLIC BLOOD PRESSURE: 91 MMHG | RESPIRATION RATE: 18 BRPM | HEART RATE: 73 BPM | SYSTOLIC BLOOD PRESSURE: 126 MMHG | HEART RATE: 82 BPM | DIASTOLIC BLOOD PRESSURE: 66 MMHG | SYSTOLIC BLOOD PRESSURE: 101 MMHG | HEART RATE: 74 BPM | SYSTOLIC BLOOD PRESSURE: 153 MMHG | DIASTOLIC BLOOD PRESSURE: 102 MMHG | SYSTOLIC BLOOD PRESSURE: 160 MMHG | HEIGHT: 68 IN | SYSTOLIC BLOOD PRESSURE: 95 MMHG | DIASTOLIC BLOOD PRESSURE: 79 MMHG | DIASTOLIC BLOOD PRESSURE: 75 MMHG | DIASTOLIC BLOOD PRESSURE: 78 MMHG | RESPIRATION RATE: 17 BRPM | HEART RATE: 79 BPM | DIASTOLIC BLOOD PRESSURE: 81 MMHG | DIASTOLIC BLOOD PRESSURE: 69 MMHG | SYSTOLIC BLOOD PRESSURE: 98 MMHG | WEIGHT: 170 LBS | HEART RATE: 81 BPM | HEART RATE: 86 BPM | DIASTOLIC BLOOD PRESSURE: 71 MMHG | OXYGEN SATURATION: 100 % | OXYGEN SATURATION: 96 % | HEART RATE: 72 BPM | DIASTOLIC BLOOD PRESSURE: 76 MMHG | SYSTOLIC BLOOD PRESSURE: 96 MMHG | RESPIRATION RATE: 16 BRPM | SYSTOLIC BLOOD PRESSURE: 118 MMHG | OXYGEN SATURATION: 98 % | TEMPERATURE: 98.2 F | SYSTOLIC BLOOD PRESSURE: 128 MMHG

## 2024-01-24 DIAGNOSIS — D12.3 BENIGN NEOPLASM OF TRANSVERSE COLON: ICD-10-CM

## 2024-01-24 DIAGNOSIS — D12.5 BENIGN NEOPLASM OF SIGMOID COLON: ICD-10-CM

## 2024-01-24 DIAGNOSIS — K31.89 OTHER DISEASES OF STOMACH AND DUODENUM: ICD-10-CM

## 2024-01-24 DIAGNOSIS — D50.0 IRON DEFICIENCY ANEMIA SECONDARY TO BLOOD LOSS (CHRONIC): ICD-10-CM

## 2024-01-24 DIAGNOSIS — Z86.010 PERSONAL HISTORY OF COLONIC POLYPS: ICD-10-CM

## 2024-01-24 DIAGNOSIS — Z09 ENCOUNTER FOR FOLLOW-UP EXAMINATION AFTER COMPLETED TREATMEN: ICD-10-CM

## 2024-01-24 DIAGNOSIS — K22.70 BARRETT'S ESOPHAGUS WITHOUT DYSPLASIA: ICD-10-CM

## 2024-01-24 PROBLEM — K63.5 POLYP OF COLON: Status: ACTIVE | Noted: 2024-01-24

## 2024-01-24 PROCEDURE — 43239 EGD BIOPSY SINGLE/MULTIPLE: CPT | Performed by: INTERNAL MEDICINE

## 2024-01-24 PROCEDURE — 88305 TISSUE EXAM BY PATHOLOGIST: CPT | Mod: 26 | Performed by: INTERNAL MEDICINE

## 2024-01-24 PROCEDURE — 45385 COLONOSCOPY W/LESION REMOVAL: CPT | Mod: PT | Performed by: INTERNAL MEDICINE

## 2024-01-24 NOTE — OUTREACH NOTE
Medical Week 2 Survey      Flowsheet Row Responses   [facility] patient discharged from? [facility]   Does the patient have one of the following disease processes/diagnoses(primary or secondary)? Other   Week 2 attempt successful? No   Unsuccessful attempts Attempt 1            MARY RAMON - Registered Nurse

## 2024-01-31 ENCOUNTER — TELEPHONE (OUTPATIENT)
Dept: FAMILY MEDICINE CLINIC | Facility: CLINIC | Age: 74
End: 2024-01-31
Payer: MEDICARE

## 2024-01-31 DIAGNOSIS — B33.8 RSV INFECTION: ICD-10-CM

## 2024-01-31 DIAGNOSIS — H66.92 LEFT OTITIS MEDIA, UNSPECIFIED OTITIS MEDIA TYPE: ICD-10-CM

## 2024-01-31 RX ORDER — GUAIFENESIN 200 MG/1
400 TABLET ORAL EVERY 6 HOURS PRN
Qty: 30 TABLET | Refills: 0 | Status: SHIPPED | OUTPATIENT
Start: 2024-01-31

## 2024-01-31 RX ORDER — AMOXICILLIN AND CLAVULANATE POTASSIUM 875; 125 MG/1; MG/1
1 TABLET, FILM COATED ORAL 2 TIMES DAILY
Qty: 14 TABLET | Refills: 0 | OUTPATIENT
Start: 2024-01-31 | End: 2024-02-07

## 2024-01-31 NOTE — TELEPHONE ENCOUNTER
Caller: Gilberto Sanders    Relationship: Self    Best call back number: [phone]    What is the best time to reach you: ANY    Who are you requesting to speak with (clinical staff, provider,  specific staff member): CLINICAL    What was the call regarding: PATIENT IS REQUESTING A REFILL ON HIS RECENT ANTIBIOTICS. HE STATED THEY WERE STARTING TO HELP BUT HE HAS NONE OF THEM LEFT NOW. PLEASE SEND TO:     [address] [phone] [facility] [phone]  [phone]

## 2024-01-31 NOTE — TELEPHONE ENCOUNTER
The Augmentin was for ear infection. It should have treated with 7 days. If still not better we need to check his ear again

## 2024-01-31 NOTE — TELEPHONE ENCOUNTER
Patient was seen 1/12/24 and finished medications.  Still having symptoms and asking for refills.

## 2024-01-31 NOTE — TELEPHONE ENCOUNTER
Caller: Gilberto Sanders    Relationship: Self    Best call back number: [phone]     Requested Prescriptions:   Requested Prescriptions     Pending Prescriptions Disp Refills    amoxicillin-clavulanate (AUGMENTIN) 875-125 MG per tablet 14 tablet 0     Sig: Take 1 tablet by mouth 2 (Two) Times a Day for 7 days.    guaiFENesin 200 MG tablet 30 tablet 0     Sig: Take 2 tablets by mouth Every 6 (Six) Hours As Needed for Cough.        Pharmacy where request should be sent: [address] [phone] [facility] [phone] [facility]     Last office visit with prescribing clinician: 1/12/2024   Last telemedicine visit with prescribing clinician: Visit date not found   Next office visit with prescribing clinician: 4/12/2024     Additional details provided by patient: PATIENT IS OUT OF MEDICATION AND WOULD LIKE REFILLS DUE TO STILL HAVING SYMPTOMS OF COUGH, CONGESTION    Does the patient have less than a 3 day supply:  [x] Yes  [] No    Would you like a call back once the refill request has been completed: [x] Yes [] No    If the office needs to give you a call back, can they leave a voicemail: [x] Yes [] No    Keyur Ferrell Rep   01/31/24 13:36 EST

## 2024-02-01 NOTE — TELEPHONE ENCOUNTER
I spoke with patient today, he is aware antibiotic wasn't refilled but the medication for cough and congestion was.  He knows to call back if this doesn't help.

## 2024-02-06 ENCOUNTER — OFFICE VISIT (OUTPATIENT)
Dept: CARDIOLOGY | Facility: CLINIC | Age: 74
End: 2024-02-06
Payer: MEDICARE

## 2024-02-06 VITALS
SYSTOLIC BLOOD PRESSURE: 129 MMHG | WEIGHT: 169 LBS | HEART RATE: 95 BPM | HEIGHT: 68 IN | DIASTOLIC BLOOD PRESSURE: 90 MMHG | BODY MASS INDEX: 25.61 KG/M2 | OXYGEN SATURATION: 97 %

## 2024-02-06 DIAGNOSIS — I48.0 PAROXYSMAL ATRIAL FIBRILLATION: Primary | ICD-10-CM

## 2024-02-06 DIAGNOSIS — I10 PRIMARY HYPERTENSION: ICD-10-CM

## 2024-02-06 DIAGNOSIS — Z95.1 STATUS POST CORONARY ARTERY BYPASS GRAFT: ICD-10-CM

## 2024-02-06 PROCEDURE — 3074F SYST BP LT 130 MM HG: CPT | Performed by: INTERNAL MEDICINE

## 2024-02-06 PROCEDURE — 99213 OFFICE O/P EST LOW 20 MIN: CPT | Performed by: INTERNAL MEDICINE

## 2024-02-06 PROCEDURE — 93000 ELECTROCARDIOGRAM COMPLETE: CPT | Performed by: INTERNAL MEDICINE

## 2024-02-06 PROCEDURE — 3080F DIAST BP >= 90 MM HG: CPT | Performed by: INTERNAL MEDICINE

## 2024-02-06 NOTE — LETTER
2024     Morris Sanz MD  [address] [facility] [place] [address]    Patient: Gilberto Sanders   YOB: 1950   Date of Visit: 2024     Dear Morris Sanz MD:       Thank you for referring Gilberto Sanders to me for evaluation. Below are the relevant portions of my assessment and plan of care.    If you have questions, please do not hesitate to call me. I look forward to following Gilberto along with you.         Sincerely,        Adrian Valentino MD        CC: No Recipients    Adrian Valentino MD  24 1354  Incomplete  Progress note      Name: Gilberto Sanders ADMIT: (Not on file)   : 1950  PCP: Marianne Chandra APRN    MRN: [id number] LOS: 0 days   AGE/SEX: 73 y.o. male  ROOM: Room/bed info not found     Chief Complaint   Patient presents with   • Follow-up     EKG 6 Month       Subjective      History of present illness  Gilberto Sanders is a 73-year-old male patient who has coronary artery disease status post bypass in 2017, dyslipidemia, atrial for hypertension, dyslipidemia, atrial fibrillation status post PVI on 2023, here today for follow-up.  Patient had RSV infection in 2023, however since then he has been complaining of shortness of breath with minimal exertion which is preventing him from performing his daily activities.  The symptoms are reminiscent of his symptoms when he had bypass surgery.    Past Medical History:   Diagnosis Date   • A-fib    • Coronary artery disease    • Hyperlipidemia    • Hypertension    • Multiple rib fractures     from a fall early spring 2023, healed without intervention   • Myocardial infarction    • RSV (acute bronchiolitis due to respiratory syncytial virus) 2024   • Skin cancer     back, chest, arms (lots of things burned off)     Past Surgical History:   Procedure Laterality Date   • APPENDECTOMY     • CARDIAC ELECTROPHYSIOLOGY PROCEDURE N/A 2023    Procedure: Ablation atrial fibrillation, RF,  rep emailed;  Surgeon: Adrian Valentino MD;  Location: [facility] CATH INVASIVE LOC[facility];  Service: Cardiovascular;  Laterality: N/A;   • CORONARY ANGIOPLASTY     • CORONARY ARTERY BYPASS GRAFT  08/30/2017    X10 Dr Kruger   • SKIN CANCER EXCISION       Family History   Problem Relation Age of Onset   • Cancer Mother    • Stroke Father    • Heart disease Father      Social History     Tobacco Use   • Smoking status: Never     Passive exposure: Never   • Smokeless tobacco: Never   Vaping Use   • Vaping Use: Never used   Substance Use Topics   • Alcohol use: No   • Drug use: No       Current Outpatient Medications:   •  albuterol sulfate  (90 Base) MCG/ACT inhaler, Inhale 2 puffs Every 4 (Four) Hours As Needed for Wheezing or Shortness of Air., Disp: 8 g, Rfl: 0  •  allopurinol (ZYLOPRIM) 300 MG tablet, Take 1 tablet by mouth Every Night., Disp: , Rfl:   •  aspirin 81 MG EC tablet, Take 1 tablet by mouth Every Night., Disp: , Rfl: 0  •  atorvastatin (LIPITOR) 40 MG tablet, Take 1 tablet by mouth Every Night., Disp: , Rfl:   •  carvedilol (Coreg) 6.25 MG tablet, Take 0.5 tablets by mouth 2 (Two) Times a Day With Meals for 30 days., Disp: 30 tablet, Rfl: 0  •  famotidine (PEPCID) 20 MG tablet, Take 1 tablet by mouth 2 (Two) Times a Day Before Meals for 30 days., Disp: 60 tablet, Rfl: 0  •  guaiFENesin 200 MG tablet, Take 2 tablets by mouth Every 6 (Six) Hours As Needed for Cough., Disp: 30 tablet, Rfl: 0  Allergies:  Patient has no known allergies.      Physical Exam  VITALS REVIEWED    General:      well developed, in no acute distress.    Head:      normocephalic and atraumatic.    Eyes:      PERRL/EOM intact, conjunctiva and sclera clear with out nystagmus.    Neck:      no masses, thyromegaly,  trachea central with normal respiratory effort and PMI displaced laterally  Lungs:      Clear to auscultation bilaterally  Heart:       Regular rate and rhythm  Msk:      no deformity or scoliosis noted of thoracic or  lumbar spine.    Pulses:      pulses normal in all 4 extremities.    Extremities:       No lower extremity edema  Neurologic:      no focal deficits.   alert oriented x3  Skin:      intact without lesions or rashes.    Psych:      alert and cooperative; normal mood and affect; normal attention span and concentration.      Result Review:               Pertinent cardiac workup    Event monitor April 2023, paroxysmal atrial fibrillation           ECG 12 Lead    Date/Time: 2/6/2024 1:51 PM  Performed by: Adrian Valentino MD    Authorized by: Adrian Valentino MD  Comparison: compared with previous ECG   Similar to previous ECG  Rhythm: sinus rhythm  Ectopy: unifocal PVCs  Rate: normal  BPM: 82  Conduction: conduction normal  ST Segments: ST segments normal  QRS axis: normal  Other findings: non-specific ST-T wave changes              Assessment and Plan      Gilberto Sanders is a 73-year-old male patient who has coronary artery disease status post bypass, atrial fibrillation status post PVI on 6/30/2023.  Patient has been in sinus rhythm since then.  He lately however has been experiencing shortness of breath with minimal exertion, exact same symptoms he had when he had his bypass surgery.  I will notify Dr. Sanz and perhaps he might want to perform ischemic workup.    Diagnoses and all orders for this visit:    1. Paroxysmal atrial fibrillation (Primary)  Overview:  Added automatically from request for surgery [id number]      2. Status post coronary artery bypass graft    3. Primary hypertension    Other orders  -     ECG 12 Lead           Return in about 8 months (around 10/6/2024).  Patient was given instructions and counseling regarding his condition or for health maintenance advice. Please see specific information pulled into the AVS if appropriate.

## 2024-02-06 NOTE — PROGRESS NOTES
Progress note      Name: Gilberto Sanders ADMIT: (Not on file)   : 1950  PCP: Marianne Chandra APRN    MRN: [id number] LOS: 0 days   AGE/SEX: 73 y.o. male  ROOM: Room/bed info not found     Chief Complaint   Patient presents with    Follow-up     EKG 6 Month       Subjective       History of present illness  Gilberto Sanders is a 73-year-old male patient who has coronary artery disease status post bypass in , dyslipidemia, atrial for hypertension, dyslipidemia, atrial fibrillation status post PVI on 2023, here today for follow-up.  Patient had RSV infection in 2023, however since then he has been complaining of shortness of breath with minimal exertion which is preventing him from performing his daily activities.  The symptoms are reminiscent of his symptoms when he had bypass surgery.    Past Medical History:   Diagnosis Date    2023    Coronary artery disease     Hyperlipidemia     Hypertension     Multiple rib fractures     from a fall early spring 2023, healed without intervention    Myocardial infarction     RSV (acute bronchiolitis due to respiratory syncytial virus) 2024    Skin cancer     back, chest, arms (lots of things burned off)     Past Surgical History:   Procedure Laterality Date    APPENDECTOMY      CARDIAC ELECTROPHYSIOLOGY PROCEDURE N/A 2023    Procedure: Ablation atrial fibrillation, RF, rep emailed;  Surgeon: Adrian Valentino MD;  Location: [facility] INVASIVE LOCATION;  Service: Cardiovascular;  Laterality: N/A;    CORONARY ANGIOPLASTY      CORONARY ARTERY BYPASS GRAFT  08/30/2017    X10 Dr Kruger    SKIN CANCER EXCISION       Family History   Problem Relation Age of Onset    Cancer Mother     Stroke Father     Heart disease Father      Social History     Tobacco Use    Smoking status: Never     Passive exposure: Never    Smokeless tobacco: Never   Vaping Use    Vaping Use: Never used   Substance Use Topics    Alcohol use: No    Drug use: No        Current Outpatient Medications:     albuterol sulfate  (90 Base) MCG/ACT inhaler, Inhale 2 puffs Every 4 (Four) Hours As Needed for Wheezing or Shortness of Air., Disp: 8 g, Rfl: 0    allopurinol (ZYLOPRIM) 300 MG tablet, Take 1 tablet by mouth Every Night., Disp: , Rfl:     aspirin 81 MG EC tablet, Take 1 tablet by mouth Every Night., Disp: , Rfl: 0    atorvastatin (LIPITOR) 40 MG tablet, Take 1 tablet by mouth Every Night., Disp: , Rfl:     carvedilol (Coreg) 6.25 MG tablet, Take 0.5 tablets by mouth 2 (Two) Times a Day With Meals for 30 days., Disp: 30 tablet, Rfl: 0    famotidine (PEPCID) 20 MG tablet, Take 1 tablet by mouth 2 (Two) Times a Day Before Meals for 30 days., Disp: 60 tablet, Rfl: 0    guaiFENesin 200 MG tablet, Take 2 tablets by mouth Every 6 (Six) Hours As Needed for Cough., Disp: 30 tablet, Rfl: 0  Allergies:  Patient has no known allergies.      Physical Exam  VITALS REVIEWED    General:      well developed, in no acute distress.    Head:      normocephalic and atraumatic.    Eyes:      PERRL/EOM intact, conjunctiva and sclera clear with out nystagmus.    Neck:      no masses, thyromegaly,  trachea central with normal respiratory effort and PMI displaced laterally  Lungs:      Clear to auscultation bilaterally  Heart:       Regular rate and rhythm  Msk:      no deformity or scoliosis noted of thoracic or lumbar spine.    Pulses:      pulses normal in all 4 extremities.    Extremities:       No lower extremity edema  Neurologic:      no focal deficits.   alert oriented x3  Skin:      intact without lesions or rashes.    Psych:      alert and cooperative; normal mood and affect; normal attention span and concentration.      Result Review :               Pertinent cardiac workup    Event monitor April 2023, paroxysmal atrial fibrillation           ECG 12 Lead    Date/Time: 2/6/2024 1:51 PM  Performed by: Adrian Valentino MD    Authorized by: Adrian Valentino MD  Comparison: compared  with previous ECG   Similar to previous ECG  Rhythm: sinus rhythm  Ectopy: unifocal PVCs  Rate: normal  BPM: 82  Conduction: conduction normal  ST Segments: ST segments normal  QRS axis: normal  Other findings: non-specific ST-T wave changes              Assessment and Plan      Gilberto Sanders is a 73-year-old male patient who has coronary artery disease status post bypass, atrial fibrillation status post PVI on 6/30/2023.  Patient has been in sinus rhythm since then.  He lately however has been experiencing shortness of breath with minimal exertion, exact same symptoms he had when he had his bypass surgery.  I will notify Dr. Sanz and perhaps he might want to perform ischemic workup.    Diagnoses and all orders for this visit:    1. Paroxysmal atrial fibrillation (Primary)  Overview:  Added automatically from request for surgery [id number]      2. Status post coronary artery bypass graft    3. Primary hypertension    Other orders  -     ECG 12 Lead           Return in about 8 months (around 10/6/2024).  Patient was given instructions and counseling regarding his condition or for health maintenance advice. Please see specific information pulled into the AVS if appropriate.

## 2024-02-23 ENCOUNTER — OFFICE VISIT (OUTPATIENT)
Dept: FAMILY MEDICINE CLINIC | Facility: CLINIC | Age: 74
End: 2024-02-23
Payer: MEDICARE

## 2024-02-23 VITALS
BODY MASS INDEX: 26.8 KG/M2 | DIASTOLIC BLOOD PRESSURE: 79 MMHG | WEIGHT: 176.8 LBS | SYSTOLIC BLOOD PRESSURE: 116 MMHG | TEMPERATURE: 98.6 F | HEIGHT: 68 IN | OXYGEN SATURATION: 99 % | HEART RATE: 79 BPM

## 2024-02-23 DIAGNOSIS — I10 PRIMARY HYPERTENSION: Primary | ICD-10-CM

## 2024-02-23 DIAGNOSIS — E78.00 PURE HYPERCHOLESTEROLEMIA: ICD-10-CM

## 2024-02-23 DIAGNOSIS — I48.0 PAROXYSMAL ATRIAL FIBRILLATION: ICD-10-CM

## 2024-02-23 PROCEDURE — 3078F DIAST BP <80 MM HG: CPT

## 2024-02-23 PROCEDURE — 99214 OFFICE O/P EST MOD 30 MIN: CPT

## 2024-02-23 PROCEDURE — 3074F SYST BP LT 130 MM HG: CPT

## 2024-02-23 RX ORDER — CARVEDILOL 6.25 MG/1
3.12 TABLET ORAL 2 TIMES DAILY WITH MEALS
Qty: 30 TABLET | Refills: 5 | Status: SHIPPED | OUTPATIENT
Start: 2024-02-23

## 2024-02-23 NOTE — ASSESSMENT & PLAN NOTE
Lipid abnormalities are stable    Plan:  Continue same medication/s without change.      Discussed medication dosage, use, side effects, and goals of treatment in detail.    Counseled patient on lifestyle modifications to help control hyperlipidemia.     Patient Treatment Goals:   LDL goal is less than 70    Followup in 6 months.

## 2024-02-23 NOTE — LETTER
February 23, 2024     Patient: Gilberto Sanders   YOB: 1950   Date of Visit: 2/23/2024       To Whom It May Concern:    It is my medical opinion that Gilberto Sanders may return to work on March 4th. He should be given frequent breaks if needed.         Sincerely,        GEORGINA Pond    CC: No Recipients

## 2024-02-23 NOTE — PROGRESS NOTES
Chief Complaint   Patient presents with    [facility] Paperwork     Brought in [facility] paperwork for his work from when was admitted into the hospital in January and wants to be released to go back to work on March 4th.     Ear Problem     Both ears feel like they need to pop, he finished the anabiotics.        Subjective          Gilberto Sanders presents to [facility]    History of Present Illness  Patient presents for follow-up on chronic medical problems including hypertension, hyperlipidemia, and atrial fibrillation. Patient denies headache, chest pain, palpitations, and shortness of breath. Patient complains of shortness of breath. Patient is here for monitoring of chronic issues due to need for monitoring of blood pressure effects of meds, adverse effects, and side effects. He has been off work since early January and wants to have paperwork saying he can go back in March. He is doing ok overall just having some fatigue and shortness of breath still.         Past History:  Medical History: has a past medical history of A-fib (2023), Coronary artery disease, Hyperlipidemia, Hypertension, Multiple rib fractures (2023), Myocardial infarction, RSV (acute bronchiolitis due to respiratory syncytial virus) (01/01/2024), and Skin cancer.   Surgical History: has a past surgical history that includes Coronary artery bypass graft (08/30/2017); Coronary angioplasty; Appendectomy; Skin cancer excision; and Cardiac electrophysiology procedure (N/A, 6/30/2023).   Family History: family history includes Cancer in his mother; Heart disease in his father; Stroke in his father.   Social History: reports that he has never smoked. He has never been exposed to tobacco smoke. He has never used smokeless tobacco. He reports that he does not drink alcohol and does not use drugs.  Allergies: Patient has no known allergies.  (Not in a hospital admission)       Social History     Socioeconomic History    Marital  "status:    Tobacco Use    Smoking status: Never     Passive exposure: Never    Smokeless tobacco: Never   Vaping Use    Vaping Use: Never used   Substance and Sexual Activity    Alcohol use: No    Drug use: No    Sexual activity: Defer       Health Maintenance Due   Topic Date Due    HEPATITIS C SCREENING  Never done    LIPID PANEL  10/11/2019       Objective     Vital Signs:   /79 (BP Location: Right arm, Patient Position: Sitting)   Pulse 79   Temp 98.6 °F (37 °C) (Infrared)   Ht 172.7 cm (68\")   Wt 80.2 kg (176 lb 12.8 oz)   SpO2 99%   BMI 26.88 kg/m²       Physical Exam  Constitutional:       Appearance: Normal appearance.   HENT:      Nose: Nose normal.      Mouth/Throat:      Mouth: Mucous membranes are moist.   Cardiovascular:      Rate and Rhythm: Normal rate and regular rhythm.      Pulses: Normal pulses.      Heart sounds: Normal heart sounds.   Pulmonary:      Effort: Pulmonary effort is normal.      Breath sounds: Normal breath sounds.   Skin:     General: Skin is warm and dry.   Neurological:      General: No focal deficit present.      Mental Status: He is alert and oriented to person, place, and time.   Psychiatric:         Mood and Affect: Mood normal.         Behavior: Behavior normal.          Review of Systems   All other systems reviewed and are negative.       Result Review :                 Assessment and Plan    Diagnoses and all orders for this visit:    1. Primary hypertension (Primary)  Assessment & Plan:  Hypertension is stable and controlled  Continue current treatment regimen.  Blood pressure will be reassessed in 6 months.      2. Paroxysmal atrial fibrillation  -     carvedilol (Coreg) 6.25 MG tablet; Take 0.5 tablets by mouth 2 (Two) Times a Day With Meals.  Dispense: 30 tablet; Refill: 5    3. Pure hypercholesterolemia  Assessment & Plan:   Lipid abnormalities are stable    Plan:  Continue same medication/s without change.      Discussed medication dosage, use, " side effects, and goals of treatment in detail.    Counseled patient on lifestyle modifications to help control hyperlipidemia.     Patient Treatment Goals:   LDL goal is less than 70    Followup in 6 months.            Pt thought to be clinically stable at this time.    Follow Up   Return if symptoms worsen or fail to improve.  Patient was given instructions and counseling regarding his condition or for health maintenance advice. Please see specific information pulled into the AVS if appropriate.

## 2024-02-28 ENCOUNTER — PATIENT ROUNDING (BHMG ONLY) (OUTPATIENT)
Dept: FAMILY MEDICINE CLINIC | Facility: CLINIC | Age: 74
End: 2024-02-28
Payer: MEDICARE

## 2024-04-12 ENCOUNTER — LAB (OUTPATIENT)
Dept: LAB | Facility: HOSPITAL | Age: 74
End: 2024-04-12
Payer: MEDICARE

## 2024-04-12 ENCOUNTER — TRANSCRIBE ORDERS (OUTPATIENT)
Dept: LAB | Facility: HOSPITAL | Age: 74
End: 2024-04-12
Payer: MEDICARE

## 2024-04-12 ENCOUNTER — OFFICE VISIT (OUTPATIENT)
Dept: FAMILY MEDICINE CLINIC | Facility: CLINIC | Age: 74
End: 2024-04-12
Payer: MEDICARE

## 2024-04-12 VITALS
BODY MASS INDEX: 28.07 KG/M2 | HEIGHT: 68 IN | WEIGHT: 185.2 LBS | OXYGEN SATURATION: 96 % | TEMPERATURE: 99.1 F | DIASTOLIC BLOOD PRESSURE: 71 MMHG | HEART RATE: 88 BPM | SYSTOLIC BLOOD PRESSURE: 133 MMHG

## 2024-04-12 DIAGNOSIS — Z12.5 SPECIAL SCREENING FOR MALIGNANT NEOPLASM OF PROSTATE: ICD-10-CM

## 2024-04-12 DIAGNOSIS — Z13.29 SCREENING FOR THYROID DISORDER: ICD-10-CM

## 2024-04-12 DIAGNOSIS — Z13.220 SCREENING, LIPID: Primary | ICD-10-CM

## 2024-04-12 DIAGNOSIS — T50.905A IMPAIRED GLUCOSE TOLERANCE ASSOCIATED WITH DRUGS: ICD-10-CM

## 2024-04-12 DIAGNOSIS — I48.0 PAROXYSMAL ATRIAL FIBRILLATION: ICD-10-CM

## 2024-04-12 DIAGNOSIS — I25.10 DISEASE OF CARDIOVASCULAR SYSTEM: ICD-10-CM

## 2024-04-12 DIAGNOSIS — Z12.5 SCREENING FOR PROSTATE CANCER: ICD-10-CM

## 2024-04-12 DIAGNOSIS — I10 ESSENTIAL HYPERTENSION, MALIGNANT: ICD-10-CM

## 2024-04-12 DIAGNOSIS — I10 PRIMARY HYPERTENSION: ICD-10-CM

## 2024-04-12 DIAGNOSIS — E78.81 LIPOID DERMATOARTHRITIS: ICD-10-CM

## 2024-04-12 DIAGNOSIS — E61.1 IRON DEFICIENCY: ICD-10-CM

## 2024-04-12 DIAGNOSIS — Z11.59 NEED FOR HEPATITIS C SCREENING TEST: ICD-10-CM

## 2024-04-12 DIAGNOSIS — C43.9 MELANOMA OF SKIN: Primary | ICD-10-CM

## 2024-04-12 DIAGNOSIS — J01.10 ACUTE NON-RECURRENT FRONTAL SINUSITIS: ICD-10-CM

## 2024-04-12 DIAGNOSIS — C43.9 MELANOMA OF SKIN: ICD-10-CM

## 2024-04-12 DIAGNOSIS — Z13.220 SCREENING, LIPID: ICD-10-CM

## 2024-04-12 DIAGNOSIS — R73.02 IMPAIRED GLUCOSE TOLERANCE ASSOCIATED WITH DRUGS: ICD-10-CM

## 2024-04-12 LAB
ALBUMIN SERPL-MCNC: 4.4 G/DL (ref 3.5–5.2)
ALBUMIN/GLOB SERPL: 1.5 G/DL
ALP SERPL-CCNC: 118 U/L (ref 39–117)
ALT SERPL W P-5'-P-CCNC: 10 U/L (ref 1–41)
ANION GAP SERPL CALCULATED.3IONS-SCNC: 12.4 MMOL/L (ref 5–15)
AST SERPL-CCNC: 20 U/L (ref 1–40)
BASOPHILS # BLD AUTO: 0.08 10*3/MM3 (ref 0–0.2)
BASOPHILS NFR BLD AUTO: 1.3 % (ref 0–1.5)
BILIRUB SERPL-MCNC: 0.6 MG/DL (ref 0–1.2)
BUN SERPL-MCNC: 35 MG/DL (ref 8–23)
BUN/CREAT SERPL: 19.4 (ref 7–25)
CALCIUM SPEC-SCNC: 10.3 MG/DL (ref 8.6–10.5)
CHLORIDE SERPL-SCNC: 108 MMOL/L (ref 98–107)
CHOLEST SERPL-MCNC: 104 MG/DL (ref 0–200)
CO2 SERPL-SCNC: 19.6 MMOL/L (ref 22–29)
CREAT SERPL-MCNC: 1.8 MG/DL (ref 0.76–1.27)
DEPRECATED RDW RBC AUTO: 52 FL (ref 37–54)
EGFRCR SERPLBLD CKD-EPI 2021: 39 ML/MIN/1.73
EOSINOPHIL # BLD AUTO: 0.1 10*3/MM3 (ref 0–0.4)
EOSINOPHIL NFR BLD AUTO: 1.6 % (ref 0.3–6.2)
ERYTHROCYTE [DISTWIDTH] IN BLOOD BY AUTOMATED COUNT: 15.4 % (ref 12.3–15.4)
GLOBULIN UR ELPH-MCNC: 3 GM/DL
GLUCOSE SERPL-MCNC: 94 MG/DL (ref 65–99)
HBA1C MFR BLD: 5.4 % (ref 4.8–5.6)
HCT VFR BLD AUTO: 33.9 % (ref 37.5–51)
HCV AB SER DONR QL: NORMAL
HDLC SERPL-MCNC: 27 MG/DL (ref 40–60)
HGB BLD-MCNC: 10.8 G/DL (ref 13–17.7)
IMM GRANULOCYTES # BLD AUTO: 0.03 10*3/MM3 (ref 0–0.05)
IMM GRANULOCYTES NFR BLD AUTO: 0.5 % (ref 0–0.5)
IRON 24H UR-MRATE: 68 MCG/DL (ref 59–158)
IRON SATN MFR SERPL: 18 % (ref 20–50)
LDLC SERPL CALC-MCNC: 61 MG/DL (ref 0–100)
LDLC/HDLC SERPL: 2.29 {RATIO}
LYMPHOCYTES # BLD AUTO: 1.13 10*3/MM3 (ref 0.7–3.1)
LYMPHOCYTES NFR BLD AUTO: 18 % (ref 19.6–45.3)
MCH RBC QN AUTO: 29.7 PG (ref 26.6–33)
MCHC RBC AUTO-ENTMCNC: 31.9 G/DL (ref 31.5–35.7)
MCV RBC AUTO: 93.1 FL (ref 79–97)
MONOCYTES # BLD AUTO: 0.46 10*3/MM3 (ref 0.1–0.9)
MONOCYTES NFR BLD AUTO: 7.3 % (ref 5–12)
NEUTROPHILS NFR BLD AUTO: 4.47 10*3/MM3 (ref 1.7–7)
NEUTROPHILS NFR BLD AUTO: 71.3 % (ref 42.7–76)
NRBC BLD AUTO-RTO: 0 /100 WBC (ref 0–0.2)
PLATELET # BLD AUTO: 228 10*3/MM3 (ref 140–450)
PMV BLD AUTO: 11.6 FL (ref 6–12)
POTASSIUM SERPL-SCNC: 4.6 MMOL/L (ref 3.5–5.2)
PROT SERPL-MCNC: 7.4 G/DL (ref 6–8.5)
PSA SERPL-MCNC: 1.73 NG/ML (ref 0–4)
RBC # BLD AUTO: 3.64 10*6/MM3 (ref 4.14–5.8)
RETICS # AUTO: 0.06 10*6/MM3 (ref 0.02–0.13)
RETICS/RBC NFR AUTO: 1.78 % (ref 0.7–1.9)
SODIUM SERPL-SCNC: 140 MMOL/L (ref 136–145)
T4 FREE SERPL-MCNC: 1.09 NG/DL (ref 0.93–1.7)
TIBC SERPL-MCNC: 387 MCG/DL (ref 298–536)
TRANSFERRIN SERPL-MCNC: 260 MG/DL (ref 200–360)
TRIGL SERPL-MCNC: 76 MG/DL (ref 0–150)
TSH SERPL DL<=0.05 MIU/L-ACNC: 1.09 UIU/ML (ref 0.27–4.2)
URATE SERPL-MCNC: 3.4 MG/DL (ref 3.4–7)
VLDLC SERPL-MCNC: 16 MG/DL (ref 5–40)
WBC NRBC COR # BLD AUTO: 6.27 10*3/MM3 (ref 3.4–10.8)

## 2024-04-12 PROCEDURE — 84439 ASSAY OF FREE THYROXINE: CPT

## 2024-04-12 PROCEDURE — 83540 ASSAY OF IRON: CPT

## 2024-04-12 PROCEDURE — 84466 ASSAY OF TRANSFERRIN: CPT

## 2024-04-12 PROCEDURE — 84443 ASSAY THYROID STIM HORMONE: CPT

## 2024-04-12 PROCEDURE — 80061 LIPID PANEL: CPT

## 2024-04-12 PROCEDURE — 85025 COMPLETE CBC W/AUTO DIFF WBC: CPT

## 2024-04-12 PROCEDURE — 80053 COMPREHEN METABOLIC PANEL: CPT

## 2024-04-12 PROCEDURE — G0103 PSA SCREENING: HCPCS

## 2024-04-12 PROCEDURE — 85045 AUTOMATED RETICULOCYTE COUNT: CPT

## 2024-04-12 PROCEDURE — 86803 HEPATITIS C AB TEST: CPT

## 2024-04-12 PROCEDURE — 36415 COLL VENOUS BLD VENIPUNCTURE: CPT

## 2024-04-12 PROCEDURE — 83036 HEMOGLOBIN GLYCOSYLATED A1C: CPT

## 2024-04-12 PROCEDURE — 84550 ASSAY OF BLOOD/URIC ACID: CPT

## 2024-04-12 RX ORDER — AZITHROMYCIN 250 MG/1
TABLET, FILM COATED ORAL
Qty: 6 TABLET | Refills: 0 | Status: SHIPPED | OUTPATIENT
Start: 2024-04-12

## 2024-04-12 RX ORDER — FERROUS GLUCONATE 324(38)MG
324 TABLET ORAL
COMMUNITY

## 2024-04-12 RX ORDER — HYDRALAZINE HYDROCHLORIDE 100 MG/1
100 TABLET, FILM COATED ORAL 2 TIMES DAILY
COMMUNITY

## 2024-04-12 NOTE — PROGRESS NOTES
"Chief Complaint  Hypertension    Subjective        Gilberto Sanders presents to [facility]  History of Present Illness  Gilberto is here today for a 3 month follow up. States he is still having trouble with his ears. States they feel like he needs to pop them     HTN-states he checks his BP at home and it is 130/70s at home       Objective   Vital Signs:  /71 (BP Location: Left arm, Patient Position: Sitting, Cuff Size: Adult)   Pulse 88   Temp 99.1 °F (37.3 °C)   Ht 172.7 cm (68\")   Wt 84 kg (185 lb 3.2 oz)   SpO2 96%   BMI 28.16 kg/m²   Estimated body mass index is 28.16 kg/m² as calculated from the following:    Height as of this encounter: 172.7 cm (68\").    Weight as of this encounter: 84 kg (185 lb 3.2 oz).               Physical Exam  Vitals and nursing note reviewed.   Constitutional:       Appearance: Normal appearance.   HENT:      Nose: Nose normal.   Cardiovascular:      Pulses: Normal pulses.      Heart sounds: Normal heart sounds.   Pulmonary:      Effort: Pulmonary effort is normal.      Breath sounds: Normal breath sounds.   Skin:     General: Skin is warm and dry.   Neurological:      General: No focal deficit present.      Mental Status: He is alert and oriented to person, place, and time.   Psychiatric:         Mood and Affect: Mood normal.         Behavior: Behavior normal.        Result Review :                     Assessment and Plan     Diagnoses and all orders for this visit:    1. Screening, lipid (Primary)  -     Lipid panel; Future    2. Need for hepatitis C screening test  -     Hepatitis C Antibody; Future    3. Screening for thyroid disorder  -     TSH+Free T4; Future    4. Primary hypertension  Assessment & Plan:  Hypertension is stable and controlled  Continue current treatment regimen.  Blood pressure will be reassessed in 6 months.    Orders:  -     CBC w AUTO Differential; Future  -     Comprehensive metabolic panel; Future    5. Acute " non-recurrent frontal sinusitis  -     azithromycin (Zithromax Z-Shabbir) 250 MG tablet; Take 2 tablets by mouth on day 1, then 1 tablet daily on days 2-5  Dispense: 6 tablet; Refill: 0    6. Iron deficiency  -     Iron Profile; Future    7. Screening for prostate cancer  -     PSA SCREENING; Future    Other orders  -     Arexvy Vaccine (RSV for Adults > 60)             Follow Up     Return if symptoms worsen or fail to improve.  Patient was given instructions and counseling regarding his condition or for health maintenance advice. Please see specific information pulled into the AVS if appropriate.

## 2024-04-15 NOTE — PROGRESS NOTES
Labs look stable for him. Kidney function has improved from 3 months ago. Cholesterol also improved
No indicators present

## 2024-04-25 ENCOUNTER — OFFICE VISIT (OUTPATIENT)
Dept: CARDIOLOGY | Facility: CLINIC | Age: 74
End: 2024-04-25
Payer: MEDICARE

## 2024-04-25 VITALS
SYSTOLIC BLOOD PRESSURE: 114 MMHG | OXYGEN SATURATION: 98 % | WEIGHT: 181 LBS | BODY MASS INDEX: 27.43 KG/M2 | HEIGHT: 68 IN | HEART RATE: 89 BPM | DIASTOLIC BLOOD PRESSURE: 66 MMHG

## 2024-04-25 DIAGNOSIS — I25.10 CORONARY ARTERY DISEASE INVOLVING NATIVE CORONARY ARTERY OF NATIVE HEART WITHOUT ANGINA PECTORIS: ICD-10-CM

## 2024-04-25 DIAGNOSIS — I10 PRIMARY HYPERTENSION: ICD-10-CM

## 2024-04-25 DIAGNOSIS — I48.0 PAROXYSMAL ATRIAL FIBRILLATION: Primary | ICD-10-CM

## 2024-04-25 DIAGNOSIS — E78.00 PURE HYPERCHOLESTEROLEMIA: ICD-10-CM

## 2024-04-25 PROCEDURE — 3078F DIAST BP <80 MM HG: CPT | Performed by: INTERNAL MEDICINE

## 2024-04-25 PROCEDURE — 1160F RVW MEDS BY RX/DR IN RCRD: CPT | Performed by: INTERNAL MEDICINE

## 2024-04-25 PROCEDURE — 99214 OFFICE O/P EST MOD 30 MIN: CPT | Performed by: INTERNAL MEDICINE

## 2024-04-25 PROCEDURE — 1159F MED LIST DOCD IN RCRD: CPT | Performed by: INTERNAL MEDICINE

## 2024-04-25 PROCEDURE — 3074F SYST BP LT 130 MM HG: CPT | Performed by: INTERNAL MEDICINE

## 2024-04-25 RX ORDER — CARVEDILOL 3.12 MG/1
3.12 TABLET ORAL 2 TIMES DAILY
Qty: 180 TABLET | Refills: 3 | Status: SHIPPED | OUTPATIENT
Start: 2024-04-25

## 2024-04-25 NOTE — PROGRESS NOTES
"    Subjective:     Encounter Date:04/25/2024      Patient ID: Gilberto Sanders is a 74 y.o. male.    Chief Complaint:  History of Present Illness 74-year-old white male with history of coronary disease status post coronary bypass surgery x 10 vessels history of hypertension hyperlipidemia presents to the office for follow-up.  Patient is currently stable without any symptoms of chest pain or shortness of breath at rest or exertion.  No complaint any PND orthopnea.  No palpitation dizziness syncope or swelling of the feet.  Patient is taking all her medicines regular.  Patient does not smoke.    The following portions of the patient's history were reviewed and updated as appropriate: allergies, current medications, past family history, past medical history, past social history, past surgical history, and problem list.  Past Medical History:   Diagnosis Date    A-fib 2023    Coronary artery disease     Hyperlipidemia     Hypertension     Multiple rib fractures 2023    from a fall early spring 2023, healed without intervention    Myocardial infarction     RSV (acute bronchiolitis due to respiratory syncytial virus) 01/01/2024    Skin cancer     back, chest, arms (lots of things burned off)     Past Surgical History:   Procedure Laterality Date    APPENDECTOMY      CARDIAC ELECTROPHYSIOLOGY PROCEDURE N/A 6/30/2023    Procedure: Ablation atrial fibrillation, RF, rep emailed;  Surgeon: Adrian Valentino MD;  Location: [facility] CATH INVASIVE LOCATION;  Service: Cardiovascular;  Laterality: N/A;    CORONARY ANGIOPLASTY      CORONARY ARTERY BYPASS GRAFT  08/30/2017    X10 Dr Kruger    SKIN CANCER EXCISION       /66   Pulse 89   Ht 172.7 cm (67.99\")   Wt 82.1 kg (181 lb)   SpO2 98%   BMI 27.53 kg/m²   Family History   Problem Relation Age of Onset    Cancer Mother     Stroke Father     Heart disease Father        Current Outpatient Medications:     albuterol sulfate  (90 Base) MCG/ACT inhaler, Inhale 2 " puffs Every 4 (Four) Hours As Needed for Wheezing or Shortness of Air., Disp: 8 g, Rfl: 0    allopurinol (ZYLOPRIM) 300 MG tablet, Take 1 tablet by mouth Every Night., Disp: , Rfl:     aspirin 81 MG EC tablet, Take 1 tablet by mouth Every Night., Disp: , Rfl: 0    atorvastatin (LIPITOR) 40 MG tablet, Take 1 tablet by mouth Every Night., Disp: , Rfl:     ferrous gluconate (FERGON) 324 MG tablet, Take 1 tablet by mouth Daily With Breakfast., Disp: , Rfl:     guaiFENesin 200 MG tablet, Take 2 tablets by mouth Every 6 (Six) Hours As Needed for Cough., Disp: 30 tablet, Rfl: 0    hydrALAZINE (APRESOLINE) 100 MG tablet, Take 1 tablet by mouth 2 (Two) Times a Day., Disp: , Rfl:     azithromycin (Zithromax Z-Shabbir) 250 MG tablet, Take 2 tablets by mouth on day 1, then 1 tablet daily on days 2-5 (Patient not taking: Reported on 4/25/2024), Disp: 6 tablet, Rfl: 0    carvedilol (Coreg) 6.25 MG tablet, Take 0.5 tablets by mouth 2 (Two) Times a Day With Meals. (Patient not taking: Reported on 4/25/2024), Disp: 30 tablet, Rfl: 5  No Known Allergies  Social History     Socioeconomic History    Marital status:    Tobacco Use    Smoking status: Never     Passive exposure: Never    Smokeless tobacco: Never   Vaping Use    Vaping status: Never Used   Substance and Sexual Activity    Alcohol use: No    Drug use: No    Sexual activity: Defer     Review of Systems   Constitutional: Negative for malaise/fatigue.   Cardiovascular:  Negative for chest pain, dyspnea on exertion, leg swelling and palpitations.   Respiratory:  Negative for cough and shortness of breath.    Gastrointestinal:  Negative for abdominal pain, nausea and vomiting.   Neurological:  Negative for dizziness, focal weakness, headaches, light-headedness and numbness.   All other systems reviewed and are negative.             Objective:     Constitutional:       Appearance: Well-developed.   Eyes:      General: No scleral icterus.     Conjunctiva/sclera: Conjunctivae  normal.   HENT:      Head: Normocephalic and atraumatic.   Neck:      Vascular: No carotid bruit or JVD.   Pulmonary:      Effort: Pulmonary effort is normal.      Breath sounds: Normal breath sounds. No wheezing. No rales.   Cardiovascular:      Normal rate. Regular rhythm.   Pulses:     Intact distal pulses.   Abdominal:      General: Bowel sounds are normal.      Palpations: Abdomen is soft.   Musculoskeletal:      Cervical back: Normal range of motion and neck supple. Skin:     General: Skin is warm and dry.      Findings: No rash.   Neurological:      Mental Status: Alert.       Procedures    Lab Review:         MDM    #1 coronary disease  Patient had a coronary bypass surgery x 510 vessels and is currently stable on medications with normal function  2.  Hypertension  Patient blood pressure is currently stable with hydralazine and does not take Coreg.  I will start him back on 3.125 mg twice daily.  3.  Hyperlipidemia  Patient's lipid levels are well-controlled with Lipitor and I will decrease the dose to 20 mg once a day  4.  Patient had a postoperative atrial fibrillation but never had any more symptoms and hence is not taking any Xarelto.  He does not need any anticoagulants except aspirin    Patient's previous medical records, labs, and EKG were reviewed and discussed with the patient at today's visit.

## 2024-07-15 ENCOUNTER — LAB (OUTPATIENT)
Dept: LAB | Facility: HOSPITAL | Age: 74
End: 2024-07-15
Payer: MEDICARE

## 2024-07-15 ENCOUNTER — TRANSCRIBE ORDERS (OUTPATIENT)
Dept: LAB | Facility: HOSPITAL | Age: 74
End: 2024-07-15
Payer: MEDICARE

## 2024-07-15 DIAGNOSIS — N18.32 CHRONIC KIDNEY DISEASE (CKD) STAGE G3B/A1, MODERATELY DECREASED GLOMERULAR FILTRATION RATE (GFR) BETWEEN 30-44 ML/MIN/1.73 SQUARE METER AND ALBUMINURIA CREATININE RATIO LESS THAN 30 MG/G (CMS/H*: ICD-10-CM

## 2024-07-15 DIAGNOSIS — I25.10 DISEASE OF CARDIOVASCULAR SYSTEM: ICD-10-CM

## 2024-07-15 DIAGNOSIS — E78.2 MIXED HYPERLIPIDEMIA: ICD-10-CM

## 2024-07-15 DIAGNOSIS — N18.32 CHRONIC KIDNEY DISEASE (CKD) STAGE G3B/A1, MODERATELY DECREASED GLOMERULAR FILTRATION RATE (GFR) BETWEEN 30-44 ML/MIN/1.73 SQUARE METER AND ALBUMINURIA CREATININE RATIO LESS THAN 30 MG/G (CMS/H*: Primary | ICD-10-CM

## 2024-07-15 DIAGNOSIS — I12.9 HYPERTENSIVE NEPHROPATHY: ICD-10-CM

## 2024-07-15 DIAGNOSIS — M10.9 GOUT, UNSPECIFIED CAUSE, UNSPECIFIED CHRONICITY, UNSPECIFIED SITE: ICD-10-CM

## 2024-07-15 LAB
ALBUMIN SERPL-MCNC: 4.3 G/DL (ref 3.5–5.2)
ANION GAP SERPL CALCULATED.3IONS-SCNC: 11 MMOL/L (ref 5–15)
BASOPHILS # BLD AUTO: 0.06 10*3/MM3 (ref 0–0.2)
BASOPHILS NFR BLD AUTO: 0.9 % (ref 0–1.5)
BILIRUB UR QL STRIP: NEGATIVE
BUN SERPL-MCNC: 33 MG/DL (ref 8–23)
BUN/CREAT SERPL: 18.6 (ref 7–25)
CALCIUM SPEC-SCNC: 10.3 MG/DL (ref 8.6–10.5)
CHLORIDE SERPL-SCNC: 108 MMOL/L (ref 98–107)
CLARITY UR: CLEAR
CO2 SERPL-SCNC: 23 MMOL/L (ref 22–29)
COLOR UR: YELLOW
CREAT SERPL-MCNC: 1.77 MG/DL (ref 0.76–1.27)
CREAT UR-MCNC: 103.3 MG/DL
DEPRECATED RDW RBC AUTO: 48.5 FL (ref 37–54)
EGFRCR SERPLBLD CKD-EPI 2021: 39.8 ML/MIN/1.73
EOSINOPHIL # BLD AUTO: 0.12 10*3/MM3 (ref 0–0.4)
EOSINOPHIL NFR BLD AUTO: 1.9 % (ref 0.3–6.2)
ERYTHROCYTE [DISTWIDTH] IN BLOOD BY AUTOMATED COUNT: 14.3 % (ref 12.3–15.4)
GLUCOSE SERPL-MCNC: 94 MG/DL (ref 65–99)
GLUCOSE UR STRIP-MCNC: NEGATIVE MG/DL
HCT VFR BLD AUTO: 35.6 % (ref 37.5–51)
HGB BLD-MCNC: 12.1 G/DL (ref 13–17.7)
HGB UR QL STRIP.AUTO: NEGATIVE
IMM GRANULOCYTES # BLD AUTO: 0.04 10*3/MM3 (ref 0–0.05)
IMM GRANULOCYTES NFR BLD AUTO: 0.6 % (ref 0–0.5)
KETONES UR QL STRIP: NEGATIVE
LEUKOCYTE ESTERASE UR QL STRIP.AUTO: NEGATIVE
LYMPHOCYTES # BLD AUTO: 1.21 10*3/MM3 (ref 0.7–3.1)
LYMPHOCYTES NFR BLD AUTO: 18.9 % (ref 19.6–45.3)
MCH RBC QN AUTO: 32.1 PG (ref 26.6–33)
MCHC RBC AUTO-ENTMCNC: 34 G/DL (ref 31.5–35.7)
MCV RBC AUTO: 94.4 FL (ref 79–97)
MONOCYTES # BLD AUTO: 0.49 10*3/MM3 (ref 0.1–0.9)
MONOCYTES NFR BLD AUTO: 7.6 % (ref 5–12)
NEUTROPHILS NFR BLD AUTO: 4.49 10*3/MM3 (ref 1.7–7)
NEUTROPHILS NFR BLD AUTO: 70.1 % (ref 42.7–76)
NITRITE UR QL STRIP: NEGATIVE
NRBC BLD AUTO-RTO: 0 /100 WBC (ref 0–0.2)
PH UR STRIP.AUTO: 5.5 [PH] (ref 5–8)
PHOSPHATE SERPL-MCNC: 2.6 MG/DL (ref 2.5–4.5)
PLATELET # BLD AUTO: 233 10*3/MM3 (ref 140–450)
PMV BLD AUTO: 11.5 FL (ref 6–12)
POTASSIUM SERPL-SCNC: 4 MMOL/L (ref 3.5–5.2)
PROT ?TM UR-MCNC: 16.8 MG/DL
PROT UR QL STRIP: ABNORMAL
PROT/CREAT UR: 0.16 MG/G{CREAT}
PTH-INTACT SERPL-MCNC: 57.9 PG/ML (ref 15–65)
RBC # BLD AUTO: 3.77 10*6/MM3 (ref 4.14–5.8)
SODIUM SERPL-SCNC: 142 MMOL/L (ref 136–145)
SP GR UR STRIP: 1.02 (ref 1–1.03)
URATE SERPL-MCNC: 3.8 MG/DL (ref 3.4–7)
UROBILINOGEN UR QL STRIP: ABNORMAL
WBC NRBC COR # BLD AUTO: 6.41 10*3/MM3 (ref 3.4–10.8)

## 2024-07-15 PROCEDURE — 81003 URINALYSIS AUTO W/O SCOPE: CPT

## 2024-07-15 PROCEDURE — 82306 VITAMIN D 25 HYDROXY: CPT

## 2024-07-15 PROCEDURE — 80069 RENAL FUNCTION PANEL: CPT

## 2024-07-15 PROCEDURE — 85025 COMPLETE CBC W/AUTO DIFF WBC: CPT

## 2024-07-15 PROCEDURE — 36415 COLL VENOUS BLD VENIPUNCTURE: CPT

## 2024-07-15 PROCEDURE — 84156 ASSAY OF PROTEIN URINE: CPT

## 2024-07-15 PROCEDURE — 82570 ASSAY OF URINE CREATININE: CPT

## 2024-07-15 PROCEDURE — 83970 ASSAY OF PARATHORMONE: CPT

## 2024-07-15 PROCEDURE — 84550 ASSAY OF BLOOD/URIC ACID: CPT

## 2024-07-16 LAB — 25(OH)D3 SERPL-MCNC: 32.5 NG/ML (ref 30–100)

## 2024-08-07 ENCOUNTER — LAB (OUTPATIENT)
Dept: LAB | Facility: HOSPITAL | Age: 74
End: 2024-08-07
Payer: MEDICARE

## 2024-08-07 ENCOUNTER — TRANSCRIBE ORDERS (OUTPATIENT)
Dept: LAB | Facility: HOSPITAL | Age: 74
End: 2024-08-07
Payer: MEDICARE

## 2024-08-07 DIAGNOSIS — N18.32 CHRONIC KIDNEY DISEASE (CKD) STAGE G3B/A1, MODERATELY DECREASED GLOMERULAR FILTRATION RATE (GFR) BETWEEN 30-44 ML/MIN/1.73 SQUARE METER AND ALBUMINURIA CREATININE RATIO LESS THAN 30 MG/G (CMS/H*: Primary | ICD-10-CM

## 2024-08-07 DIAGNOSIS — N18.32 CHRONIC KIDNEY DISEASE (CKD) STAGE G3B/A1, MODERATELY DECREASED GLOMERULAR FILTRATION RATE (GFR) BETWEEN 30-44 ML/MIN/1.73 SQUARE METER AND ALBUMINURIA CREATININE RATIO LESS THAN 30 MG/G (CMS/H*: ICD-10-CM

## 2024-08-07 DIAGNOSIS — N18.9 CHRONIC KIDNEY DISEASE, UNSPECIFIED CKD STAGE: ICD-10-CM

## 2024-08-07 DIAGNOSIS — I12.9 HYPERTENSIVE NEPHROPATHY: ICD-10-CM

## 2024-08-07 LAB
ANION GAP SERPL CALCULATED.3IONS-SCNC: 13.1 MMOL/L (ref 5–15)
BUN SERPL-MCNC: 30 MG/DL (ref 8–23)
BUN/CREAT SERPL: 18.2 (ref 7–25)
CALCIUM SPEC-SCNC: 10.3 MG/DL (ref 8.6–10.5)
CHLORIDE SERPL-SCNC: 107 MMOL/L (ref 98–107)
CO2 SERPL-SCNC: 21.9 MMOL/L (ref 22–29)
CREAT SERPL-MCNC: 1.65 MG/DL (ref 0.76–1.27)
EGFRCR SERPLBLD CKD-EPI 2021: 43.3 ML/MIN/1.73
GLUCOSE SERPL-MCNC: 98 MG/DL (ref 65–99)
POTASSIUM SERPL-SCNC: 4 MMOL/L (ref 3.5–5.2)
SODIUM SERPL-SCNC: 142 MMOL/L (ref 136–145)

## 2024-08-07 PROCEDURE — 80048 BASIC METABOLIC PNL TOTAL CA: CPT

## 2024-08-07 PROCEDURE — 36415 COLL VENOUS BLD VENIPUNCTURE: CPT

## 2024-10-16 ENCOUNTER — LAB (OUTPATIENT)
Dept: LAB | Facility: HOSPITAL | Age: 74
End: 2024-10-16
Payer: MEDICARE

## 2024-10-16 ENCOUNTER — TRANSCRIBE ORDERS (OUTPATIENT)
Dept: ADMINISTRATIVE | Facility: HOSPITAL | Age: 74
End: 2024-10-16
Payer: MEDICARE

## 2024-10-16 ENCOUNTER — OFFICE VISIT (OUTPATIENT)
Dept: CARDIOLOGY | Facility: CLINIC | Age: 74
End: 2024-10-16
Payer: MEDICARE

## 2024-10-16 VITALS
DIASTOLIC BLOOD PRESSURE: 71 MMHG | WEIGHT: 189.6 LBS | SYSTOLIC BLOOD PRESSURE: 146 MMHG | OXYGEN SATURATION: 96 % | HEART RATE: 62 BPM | BODY MASS INDEX: 29.76 KG/M2 | HEIGHT: 67 IN

## 2024-10-16 DIAGNOSIS — N18.9 CHRONIC KIDNEY DISEASE, UNSPECIFIED CKD STAGE: ICD-10-CM

## 2024-10-16 DIAGNOSIS — N18.32 CHRONIC KIDNEY DISEASE (CKD) STAGE G3B/A1, MODERATELY DECREASED GLOMERULAR FILTRATION RATE (GFR) BETWEEN 30-44 ML/MIN/1.73 SQUARE METER AND ALBUMINURIA CREATININE RATIO LESS THAN 30 MG/G (CMS/H*: Primary | ICD-10-CM

## 2024-10-16 DIAGNOSIS — I10 PRIMARY HYPERTENSION: ICD-10-CM

## 2024-10-16 DIAGNOSIS — N18.32 CHRONIC KIDNEY DISEASE (CKD) STAGE G3B/A1, MODERATELY DECREASED GLOMERULAR FILTRATION RATE (GFR) BETWEEN 30-44 ML/MIN/1.73 SQUARE METER AND ALBUMINURIA CREATININE RATIO LESS THAN 30 MG/G (CMS/H*: ICD-10-CM

## 2024-10-16 DIAGNOSIS — Z95.5 STATUS POST CORONARY ARTERY STENT PLACEMENT: ICD-10-CM

## 2024-10-16 DIAGNOSIS — I12.9 HYPERTENSIVE NEPHROPATHY: ICD-10-CM

## 2024-10-16 DIAGNOSIS — Z95.1 S/P CABG (CORONARY ARTERY BYPASS GRAFT): ICD-10-CM

## 2024-10-16 DIAGNOSIS — I48.0 PAROXYSMAL ATRIAL FIBRILLATION: Primary | ICD-10-CM

## 2024-10-16 LAB
ALBUMIN SERPL-MCNC: 4.1 G/DL (ref 3.5–5.2)
ANION GAP SERPL CALCULATED.3IONS-SCNC: 13.7 MMOL/L (ref 5–15)
BACTERIA UR QL AUTO: NORMAL /HPF
BASOPHILS # BLD AUTO: 0.08 10*3/MM3 (ref 0–0.2)
BASOPHILS NFR BLD AUTO: 1.2 % (ref 0–1.5)
BILIRUB UR QL STRIP: NEGATIVE
BUN SERPL-MCNC: 29 MG/DL (ref 8–23)
BUN/CREAT SERPL: 15.5 (ref 7–25)
CALCIUM SPEC-SCNC: 10.1 MG/DL (ref 8.6–10.5)
CHLORIDE SERPL-SCNC: 105 MMOL/L (ref 98–107)
CLARITY UR: CLEAR
CO2 SERPL-SCNC: 22.3 MMOL/L (ref 22–29)
COLOR UR: YELLOW
CREAT SERPL-MCNC: 1.87 MG/DL (ref 0.76–1.27)
CREAT UR-MCNC: 59.6 MG/DL
DEPRECATED RDW RBC AUTO: 49.9 FL (ref 37–54)
EGFRCR SERPLBLD CKD-EPI 2021: 37.3 ML/MIN/1.73
EOSINOPHIL # BLD AUTO: 0.21 10*3/MM3 (ref 0–0.4)
EOSINOPHIL NFR BLD AUTO: 3.1 % (ref 0.3–6.2)
ERYTHROCYTE [DISTWIDTH] IN BLOOD BY AUTOMATED COUNT: 14 % (ref 12.3–15.4)
GLUCOSE SERPL-MCNC: 97 MG/DL (ref 65–99)
GLUCOSE UR STRIP-MCNC: NEGATIVE MG/DL
HCT VFR BLD AUTO: 40 % (ref 37.5–51)
HGB BLD-MCNC: 13.3 G/DL (ref 13–17.7)
HGB UR QL STRIP.AUTO: NEGATIVE
HYALINE CASTS UR QL AUTO: NORMAL /LPF
IMM GRANULOCYTES # BLD AUTO: 0.03 10*3/MM3 (ref 0–0.05)
IMM GRANULOCYTES NFR BLD AUTO: 0.4 % (ref 0–0.5)
KETONES UR QL STRIP: NEGATIVE
LEUKOCYTE ESTERASE UR QL STRIP.AUTO: NEGATIVE
LYMPHOCYTES # BLD AUTO: 1.41 10*3/MM3 (ref 0.7–3.1)
LYMPHOCYTES NFR BLD AUTO: 20.8 % (ref 19.6–45.3)
MCH RBC QN AUTO: 32.3 PG (ref 26.6–33)
MCHC RBC AUTO-ENTMCNC: 33.3 G/DL (ref 31.5–35.7)
MCV RBC AUTO: 97.1 FL (ref 79–97)
MONOCYTES # BLD AUTO: 0.57 10*3/MM3 (ref 0.1–0.9)
MONOCYTES NFR BLD AUTO: 8.4 % (ref 5–12)
NEUTROPHILS NFR BLD AUTO: 4.47 10*3/MM3 (ref 1.7–7)
NEUTROPHILS NFR BLD AUTO: 66.1 % (ref 42.7–76)
NITRITE UR QL STRIP: NEGATIVE
NRBC BLD AUTO-RTO: 0 /100 WBC (ref 0–0.2)
PH UR STRIP.AUTO: 6.5 [PH] (ref 5–8)
PHOSPHATE SERPL-MCNC: 3.2 MG/DL (ref 2.5–4.5)
PLATELET # BLD AUTO: 226 10*3/MM3 (ref 140–450)
PMV BLD AUTO: 11.5 FL (ref 6–12)
POTASSIUM SERPL-SCNC: 4.6 MMOL/L (ref 3.5–5.2)
PROT ?TM UR-MCNC: 15.3 MG/DL
PROT UR QL STRIP: ABNORMAL
PROT/CREAT UR: 0.26 MG/G{CREAT}
RBC # BLD AUTO: 4.12 10*6/MM3 (ref 4.14–5.8)
RBC # UR STRIP: NORMAL /HPF
REF LAB TEST METHOD: NORMAL
SODIUM SERPL-SCNC: 141 MMOL/L (ref 136–145)
SP GR UR STRIP: 1.01 (ref 1–1.03)
SQUAMOUS #/AREA URNS HPF: NORMAL /HPF
UROBILINOGEN UR QL STRIP: ABNORMAL
WBC # UR STRIP: NORMAL /HPF
WBC NRBC COR # BLD AUTO: 6.77 10*3/MM3 (ref 3.4–10.8)

## 2024-10-16 PROCEDURE — 82570 ASSAY OF URINE CREATININE: CPT

## 2024-10-16 PROCEDURE — 3078F DIAST BP <80 MM HG: CPT | Performed by: INTERNAL MEDICINE

## 2024-10-16 PROCEDURE — 81001 URINALYSIS AUTO W/SCOPE: CPT

## 2024-10-16 PROCEDURE — 99213 OFFICE O/P EST LOW 20 MIN: CPT | Performed by: INTERNAL MEDICINE

## 2024-10-16 PROCEDURE — 93000 ELECTROCARDIOGRAM COMPLETE: CPT | Performed by: INTERNAL MEDICINE

## 2024-10-16 PROCEDURE — 1160F RVW MEDS BY RX/DR IN RCRD: CPT | Performed by: INTERNAL MEDICINE

## 2024-10-16 PROCEDURE — 3077F SYST BP >= 140 MM HG: CPT | Performed by: INTERNAL MEDICINE

## 2024-10-16 PROCEDURE — 1159F MED LIST DOCD IN RCRD: CPT | Performed by: INTERNAL MEDICINE

## 2024-10-16 PROCEDURE — 84156 ASSAY OF PROTEIN URINE: CPT

## 2024-10-16 PROCEDURE — 80069 RENAL FUNCTION PANEL: CPT

## 2024-10-16 PROCEDURE — 36415 COLL VENOUS BLD VENIPUNCTURE: CPT

## 2024-10-16 PROCEDURE — 85025 COMPLETE CBC W/AUTO DIFF WBC: CPT

## 2024-10-16 RX ORDER — FAMOTIDINE 20 MG/1
20 TABLET, FILM COATED ORAL
COMMUNITY

## 2024-10-16 RX ORDER — LOSARTAN POTASSIUM 25 MG/1
25 TABLET ORAL
COMMUNITY
Start: 2024-07-24 | End: 2025-07-24

## 2024-10-16 RX ORDER — ASPIRIN 325 MG
325 TABLET ORAL DAILY
Start: 2024-10-16

## 2024-10-16 NOTE — PROGRESS NOTES
Progress note      Name: Gilberto Sanders ADMIT: (Not on file)   : 1950  PCP: Amarilys Bear MD    MRN: [id number] LOS: 0 days   AGE/SEX: 74 y.o. male  ROOM: Room/bed info not found     Chief Complaint   Patient presents with    Follow-up     8mth       Subjective       History of present illness  Gilberto Sanders is a 74-year-old male patient who has coronary artery disease status post bypass in , dyslipidemia, atrial for hypertension, dyslipidemia, atrial fibrillation status post PVI on 2023, here today for follow-up.   He is doing well, no issues to report.    Past Medical History:   Diagnosis Date    -2023    Coronary artery disease     Hyperlipidemia     Hypertension     Multiple rib fractures     from a fall early spring 2023, healed without intervention    Myocardial infarction     RSV (acute bronchiolitis due to respiratory syncytial virus) 2024    Skin cancer     back, chest, arms (lots of things burned off)     Past Surgical History:   Procedure Laterality Date    ABLATION OF DYSRHYTHMIC FOCUS      APPENDECTOMY      CARDIAC ELECTROPHYSIOLOGY PROCEDURE N/A 2023    Procedure: Ablation atrial fibrillation, RF, rep emailed;  Surgeon: Adrian Valentino MD;  Location: [facility] CATH INVASIVE LOCATION;  Service: Cardiovascular;  Laterality: N/A;    CORONARY ANGIOPLASTY      CORONARY ARTERY BYPASS GRAFT  08/30/2017    X10 Dr Kruger    CORONARY STENT PLACEMENT      RENAL ARTERY STENT      SKIN CANCER EXCISION       Family History   Problem Relation Age of Onset    Cancer Mother     Stroke Father     Heart disease Father      Social History     Tobacco Use    Smoking status: Never     Passive exposure: Never    Smokeless tobacco: Never   Vaping Use    Vaping status: Never Used   Substance Use Topics    Alcohol use: No    Drug use: No       Current Outpatient Medications:     albuterol sulfate  (90 Base) MCG/ACT inhaler, Inhale 2 puffs Every 4 (Four) Hours As Needed for  Wheezing or Shortness of Air., Disp: 8 g, Rfl: 0    allopurinol (ZYLOPRIM) 300 MG tablet, Take 1 tablet by mouth Every Night., Disp: , Rfl:     atorvastatin (LIPITOR) 40 MG tablet, Take 1 tablet by mouth Every Night., Disp: , Rfl:     carvedilol (COREG) 3.125 MG tablet, Take 1 tablet by mouth 2 (Two) Times a Day., Disp: 180 tablet, Rfl: 3    famotidine (PEPCID) 20 MG tablet, Take 1 tablet by mouth., Disp: , Rfl:     ferrous gluconate (FERGON) 324 MG tablet, Take 1 tablet by mouth Daily With Breakfast., Disp: , Rfl:     guaiFENesin 200 MG tablet, Take 2 tablets by mouth Every 6 (Six) Hours As Needed for Cough., Disp: 30 tablet, Rfl: 0    hydrALAZINE (APRESOLINE) 100 MG tablet, Take 1 tablet by mouth 2 (Two) Times a Day., Disp: , Rfl:     losartan (COZAAR) 25 MG tablet, Take 1 tablet by mouth., Disp: , Rfl:     aspirin 325 MG tablet, Take 1 tablet by mouth Daily., Disp: , Rfl:     azithromycin (Zithromax Z-Shabbir) 250 MG tablet, Take 2 tablets by mouth on day 1, then 1 tablet daily on days 2-5 (Patient not taking: Reported on 10/16/2024), Disp: 6 tablet, Rfl: 0  Allergies:  Patient has no known allergies.      Physical Exam  VITALS REVIEWED    General:      well developed, in no acute distress.    Head:      normocephalic and atraumatic.    Eyes:      PERRL/EOM intact, conjunctiva and sclera clear with out nystagmus.    Neck:      no masses, thyromegaly,  trachea central with normal respiratory effort and PMI displaced laterally  Lungs:      Clear to auscultation bilaterally  Heart:       Regular rate and rhythm  Msk:      no deformity or scoliosis noted of thoracic or lumbar spine.    Pulses:      pulses normal in all 4 extremities.    Extremities:       No lower extremity edema  Neurologic:      no focal deficits.   alert oriented x3  Skin:      intact without lesions or rashes.    Psych:      alert and cooperative; normal mood and affect; normal attention span and concentration.      Result Review :                Pertinent cardiac workup    Event monitor April 2023, paroxysmal atrial fibrillation  Echo 1/2/2024 ejection fraction 45%         ECG 12 Lead    Date/Time: 10/16/2024 3:12 PM  Performed by: Adrian Valentino MD    Authorized by: Adrian Valentino MD  Comparison: compared with previous ECG   Similar to previous ECG  Rhythm: sinus rhythm  Ectopy: infrequent PVCs  Rate: normal  BPM: 83  Conduction: 2nd degree AV block (Mobitz 1)  ST Segments: ST segments normal  QRS axis: normal  Other findings: non-specific ST-T wave changes              Assessment and Plan      Gilberto Sanders is a 74-year-old male patient who has coronary artery disease status post bypass, atrial fibrillation status post PVI on 6/30/2023. Patient has been in sinus rhythm since then.   He has been doing well no anginal symptoms no CHF symptoms.  He is not on antiarrhythmics, EKG showing second-degree type I AV block but no significant bradycardic events.  Continue same therapy for now.  Patient is no longer on anticoagulants, just baby aspirin.    Diagnoses and all orders for this visit:    1. Paroxysmal atrial fibrillation (Primary)  Overview:  Added automatically from request for surgery [id number]      2. Status post coronary artery stent placement    3. S/P CABG x10 by Dr. Kruger    4. Primary hypertension    Other orders  -     aspirin 325 MG tablet; Take 1 tablet by mouth Daily.           Return in about 1 year (around 10/16/2025).  Patient was given instructions and counseling regarding his condition or for health maintenance advice. Please see specific information pulled into the AVS if appropriate.       Electronically signed by Adrian Valentino MD, 10/16/24, 3:15 PM EDT.

## 2024-10-22 ENCOUNTER — TELEPHONE (OUTPATIENT)
Dept: ORTHOPEDIC SURGERY | Facility: CLINIC | Age: 74
End: 2024-10-22
Payer: MEDICARE

## 2024-10-22 NOTE — TELEPHONE ENCOUNTER
SUSANNE WILLINGHAM-  called from [facility], stated patient was seen at there office, has rt knee fx  Xrays was done at another facility a few days prior.  It is a work comp injury  Patient wanted seen in our office since lived in area  I advised would need records for review faxed, any:  Work comp info  Xray reports   Any clinic notes or referrals  The records would be reviewed by our clinic and if see for this injury would sched appt  Facility faxing all info  For review

## 2024-10-25 ENCOUNTER — PREP FOR SURGERY (OUTPATIENT)
Dept: OTHER | Facility: HOSPITAL | Age: 74
End: 2024-10-25
Payer: MEDICARE

## 2024-10-25 ENCOUNTER — OFFICE VISIT (OUTPATIENT)
Dept: ORTHOPEDIC SURGERY | Facility: CLINIC | Age: 74
End: 2024-10-25
Payer: OTHER MISCELLANEOUS

## 2024-10-25 ENCOUNTER — TELEPHONE (OUTPATIENT)
Dept: CARDIOLOGY | Facility: CLINIC | Age: 74
End: 2024-10-25
Payer: MEDICARE

## 2024-10-25 VITALS
WEIGHT: 189 LBS | OXYGEN SATURATION: 98 % | HEIGHT: 67 IN | BODY MASS INDEX: 29.66 KG/M2 | HEART RATE: 80 BPM | DIASTOLIC BLOOD PRESSURE: 77 MMHG | SYSTOLIC BLOOD PRESSURE: 124 MMHG

## 2024-10-25 DIAGNOSIS — S82.001A CLOSED DISPLACED FRACTURE OF RIGHT PATELLA, UNSPECIFIED FRACTURE MORPHOLOGY, INITIAL ENCOUNTER: Primary | ICD-10-CM

## 2024-10-25 NOTE — PROGRESS NOTES
"Chief Complaint  Pain and Initial Evaluation of the Right Knee (Xray on disk)       Subjective      Gilberto Sanders presents to [facility] for an evaluation  of his right knee. He reports he was at work when he tripped over an extension cord and landed on his knee. He went to a facility in [place], [place] where he had x-rays done on his right knee which he bought a disk with these x-rays. He presents to the office today in a wheel chair and without a brace. This is a work comp injury.     No Known Allergies     Social History     Socioeconomic History    Marital status:    Tobacco Use    Smoking status: Never     Passive exposure: Never    Smokeless tobacco: Never   Vaping Use    Vaping status: Never Used   Substance and Sexual Activity    Alcohol use: No    Drug use: No    Sexual activity: Not Currently     Partners: Female     Birth control/protection: Condom, Spermicide        I reviewed the patient's chief complaint, history of present illness, review of systems, past medical history, surgical history, family history, social history, medications, and allergy list.     Review of Systems     Constitutional: Denies fevers, chills, weight loss  Cardiovascular: Denies chest pain, shortness of breath  Skin: Denies rashes, acute skin changes  Neurologic: Denies headache, loss of consciousness  MSK: right knee pain       Vital Signs:   /77   Pulse 80   Ht 170.2 cm (67\")   Wt 85.7 kg (189 lb)   SpO2 98%   BMI 29.60 kg/m²            Ortho Exam    Physical Exam  General:Alert. No acute distress     Right lower extremity:  Bruising and ecchymoses to the right knee, moderate swelling, mild tenderness, weakness with extension but can briefly hold a straight leg raise, limited range of motion secondary to pain, distal neurovascularly intact, calf soft, positive  pulses, positive EHL, FHL, GS, and TA. Sensation intact to all 5 nerves of the foot.     Procedures    Imaging " Results (Most Recent)       None             Result Review :       No results found.           Assessment and Plan     Diagnoses and all orders for this visit:    1. Closed displaced fracture of right patella, unspecified fracture morphology, initial encounter (Primary)      The patient presents here today for an evaluation  of his right knee.     Discussed operative treatment options regarding a right patella ORIF versus non operative treatment options regarding bracing, medications and therapy.     Patient wishes to proceed with operative treatment options. Risks and benefits were discussed and reviewed with the patient today in the office. Patient expressed understanding and wishes to proceed.      Discussed surgery., Risks/benefits discussed with patient including, but not limited to: infection, bleeding, neurovascular damage, re-rupture, aesthetic deformity, need for further surgery, and death., Surgery pamphlet given., Call or return if worsening symptoms., DME order for a 3 in 1 given today due to patient will be confined to one room/level of the home that does not offer a toilet during postop recovery. , and I am ordering the use of the Nice1 cold therapy machine for 60 days post-op as part of an opioid-sparing approach to help manage pain and edema.  I feel this is medically necessary for the best care for this patient.       Follow Up     2 weeks post-operatively      Will obtain X-Rays of right knee at next visit.       Patient was given instructions and counseling regarding his condition or for health maintenance advice. Please see specific information pulled into the AVS if appropriate.     Scribed for Aníbal Montoya MD by Roxanne Pham.  10/25/24   08:57 EDT    I have personally performed the services described in this document as scribed by the above individual and it is both accurate and complete. Aníbal Montoya MD 10/25/24

## 2024-10-25 NOTE — PRE-PROCEDURE INSTRUCTIONS
PATIENT INSTRUCTED TO BE:    - NOTHING TO EAT OR DRINK AFTER MIDNIGHT     - TO HOLD ALL VITAMINS, SUPPLEMENTS, NSAIDS FOR ONE WEEK PRIOR TO THEIR SURGICAL PROCEDURE  - INSTRUCTED PT TO USE SURGICAL SOAP 1 TIME  AM OF SURGERY _____10/28/24________   USE THE SOAP FROM NECK TO TOES, AVOID THEIR FACE, HAIR, AND PRIVATE PARTS. IF USE THE SOAP THE NIGHT PRIOR TO SURGERY, CHANGE BED LINENS AND NO PETS IN THE BED.     INSTRUCTED NO LOTIONS, JEWELRY, PIERCINGS,  NAIL POLISH, OR DEODORANT DAY OF SURGERY    -  -INSTRUCTED TO TAKE THE FOLLOWING MEDICATIONS THE DAY OF SURGERY WITH SIPS OF WATER: ASPIRIN, HYDRALAZINE, CARVEDILOL,   PEPCID        - DO NOT BRING ANY MEDICATIONS WITH YOU TO THE HOSPITAL THE DAY OF SURGERY, EXCEPT IF USE INHALERS. BRING INHALERS DAY OF SURGERY         - DO NOT SMOKE OR VAPE 24 HOURS PRIOR TO PROCEDURE PER ANESTHESIA REQUEST     -MAKE SURE YOU HAVE A RIDE HOME OR SOMEONE TO STAY WITH YOU THE DAY OF THE PROCEDURE AFTER YOU GO HOME     - FOLLOW ANY OTHER INSTRUCTIONS GIVEN TO YOU BY YOUR SURGEON'S OFFICE.     - DAY OF SURGERY _10/28/24__ COME TO [facility]/ [facility], FIRST FLOOR. CHECK IN AT THE DESK FOR REGISTRATION/SURGERY    - YOU WILL RECEIVE A PHONE CALL THE DAY PRIOR TO SURGERY BETWEEN 1PM AND 4 PM WITH ARRIVAL TIME, IF YOUR SURGERY IS ON A MONDAY YOU WILL RECEIVE A CALL THE FRIDAY PRIOR TO SURGERY DATE    - BRING CASH OR CREDIT CARD FOR COPAYMENT OF MEDICATIONS AFTER SURGERY IF YOU USE THE HOSPITAL PHARMACY (MEDS TO BED)    - PREADMISSION TESTING NURSE JOSELUIS OAKLEY -[phone] IF HAVE ANY QUESTIONS     -PATIENT PROVIDED THE NUMBER FOR PREOP SURGICAL DEPT IF HAD QUESTIONS AFTER HOURS PRIOR TO SURGERY ( [phone]).  INFORMED PT IF NO ANSWER, LEAVE A MESSAGE AND SOMEONE WILL RETURN THEIR CALL       PATIENT VERBALIZED UNDERSTANDING

## 2024-10-25 NOTE — TELEPHONE ENCOUNTER
FACILITY: [facility]  DR: LONI PEREZ  PHONE: [phone]  FAX: [phone]  PROCEDURE: RIGHT PATTEMOSHEA ORIF  SCHEDULED: 10/25/24  MEDS TO HOLD: ASA

## 2024-10-25 NOTE — H&P (VIEW-ONLY)
"Chief Complaint  Pain and Initial Evaluation of the Right Knee (Xray on disk)       Subjective      Gilberto Sanders presents to [facility] for an evaluation  of his right knee. He reports he was at work when he tripped over an extension cord and landed on his knee. He went to a facility in [place], [place] where he had x-rays done on his right knee which he bought a disk with these x-rays. He presents to the office today in a wheel chair and without a brace. This is a work comp injury.     No Known Allergies     Social History     Socioeconomic History    Marital status:    Tobacco Use    Smoking status: Never     Passive exposure: Never    Smokeless tobacco: Never   Vaping Use    Vaping status: Never Used   Substance and Sexual Activity    Alcohol use: No    Drug use: No    Sexual activity: Not Currently     Partners: Female     Birth control/protection: Condom, Spermicide        I reviewed the patient's chief complaint, history of present illness, review of systems, past medical history, surgical history, family history, social history, medications, and allergy list.     Review of Systems     Constitutional: Denies fevers, chills, weight loss  Cardiovascular: Denies chest pain, shortness of breath  Skin: Denies rashes, acute skin changes  Neurologic: Denies headache, loss of consciousness  MSK: right knee pain       Vital Signs:   /77   Pulse 80   Ht 170.2 cm (67\")   Wt 85.7 kg (189 lb)   SpO2 98%   BMI 29.60 kg/m²            Ortho Exam    Physical Exam  General:Alert. No acute distress     Right lower extremity:  Bruising and ecchymoses to the right knee, moderate swelling, mild tenderness, weakness with extension but can briefly hold a straight leg raise, limited range of motion secondary to pain, distal neurovascularly intact, calf soft, positive  pulses, positive EHL, FHL, GS, and TA. Sensation intact to all 5 nerves of the foot.     Procedures    Imaging " Results (Most Recent)       None             Result Review :       No results found.           Assessment and Plan     Diagnoses and all orders for this visit:    1. Closed displaced fracture of right patella, unspecified fracture morphology, initial encounter (Primary)      The patient presents here today for an evaluation  of his right knee.     Discussed operative treatment options regarding a right patella ORIF versus non operative treatment options regarding bracing, medications and therapy.     Patient wishes to proceed with operative treatment options. Risks and benefits were discussed and reviewed with the patient today in the office. Patient expressed understanding and wishes to proceed.      Discussed surgery., Risks/benefits discussed with patient including, but not limited to: infection, bleeding, neurovascular damage, re-rupture, aesthetic deformity, need for further surgery, and death., Surgery pamphlet given., Call or return if worsening symptoms., DME order for a 3 in 1 given today due to patient will be confined to one room/level of the home that does not offer a toilet during postop recovery. , and I am ordering the use of the Nice1 cold therapy machine for 60 days post-op as part of an opioid-sparing approach to help manage pain and edema.  I feel this is medically necessary for the best care for this patient.       Follow Up     2 weeks post-operatively      Will obtain X-Rays of right knee at next visit.       Patient was given instructions and counseling regarding his condition or for health maintenance advice. Please see specific information pulled into the AVS if appropriate.     Scribed for Aníbal Montoya MD by Roxanne Pham.  10/25/24   08:57 EDT    I have personally performed the services described in this document as scribed by the above individual and it is both accurate and complete. Aníbal Montoya MD 10/25/24

## 2024-10-28 ENCOUNTER — HOSPITAL ENCOUNTER (OUTPATIENT)
Facility: HOSPITAL | Age: 74
Discharge: NURSING, REHAB OR LONG-TERM CARE | End: 2024-11-01
Attending: ORTHOPAEDIC SURGERY | Admitting: ORTHOPAEDIC SURGERY
Payer: OTHER MISCELLANEOUS

## 2024-10-28 ENCOUNTER — APPOINTMENT (OUTPATIENT)
Dept: GENERAL RADIOLOGY | Facility: HOSPITAL | Age: 74
End: 2024-10-28
Payer: MEDICARE

## 2024-10-28 ENCOUNTER — ANESTHESIA EVENT CONVERTED (OUTPATIENT)
Dept: ANESTHESIOLOGY | Facility: HOSPITAL | Age: 74
End: 2024-10-28
Payer: MEDICARE

## 2024-10-28 ENCOUNTER — ANESTHESIA (OUTPATIENT)
Dept: PERIOP | Facility: HOSPITAL | Age: 74
End: 2024-10-28
Payer: OTHER MISCELLANEOUS

## 2024-10-28 ENCOUNTER — ANESTHESIA EVENT (OUTPATIENT)
Dept: PERIOP | Facility: HOSPITAL | Age: 74
End: 2024-10-28
Payer: OTHER MISCELLANEOUS

## 2024-10-28 DIAGNOSIS — S82.001A CLOSED DISPLACED FRACTURE OF RIGHT PATELLA, UNSPECIFIED FRACTURE MORPHOLOGY, INITIAL ENCOUNTER: ICD-10-CM

## 2024-10-28 DIAGNOSIS — R26.2 DIFFICULTY IN WALKING: Primary | ICD-10-CM

## 2024-10-28 LAB
ANION GAP SERPL CALCULATED.3IONS-SCNC: 14.5 MMOL/L (ref 5–15)
BUN SERPL-MCNC: 42 MG/DL (ref 8–23)
BUN/CREAT SERPL: 22.7 (ref 7–25)
CALCIUM SPEC-SCNC: 10.1 MG/DL (ref 8.6–10.5)
CHLORIDE SERPL-SCNC: 103 MMOL/L (ref 98–107)
CO2 SERPL-SCNC: 19.5 MMOL/L (ref 22–29)
CREAT SERPL-MCNC: 1.85 MG/DL (ref 0.76–1.27)
EGFRCR SERPLBLD CKD-EPI 2021: 37.7 ML/MIN/1.73
GLUCOSE SERPL-MCNC: 80 MG/DL (ref 65–99)
POTASSIUM SERPL-SCNC: ABNORMAL MMOL/L
SODIUM SERPL-SCNC: 137 MMOL/L (ref 136–145)

## 2024-10-28 PROCEDURE — 25010000002 DEXAMETHASONE PER 1 MG: Performed by: NURSE ANESTHETIST, CERTIFIED REGISTERED

## 2024-10-28 PROCEDURE — 76000 FLUOROSCOPY <1 HR PHYS/QHP: CPT

## 2024-10-28 PROCEDURE — 27524 TREAT KNEECAP FRACTURE: CPT | Performed by: ORTHOPAEDIC SURGERY

## 2024-10-28 PROCEDURE — 80048 BASIC METABOLIC PNL TOTAL CA: CPT | Performed by: ANESTHESIOLOGY

## 2024-10-28 PROCEDURE — 25010000002 MIDAZOLAM PER 1MG: Performed by: ANESTHESIOLOGY

## 2024-10-28 PROCEDURE — 25010000002 MORPHINE PER 10 MG: Performed by: ORTHOPAEDIC SURGERY

## 2024-10-28 PROCEDURE — 99213 OFFICE O/P EST LOW 20 MIN: CPT | Performed by: PHYSICIAN ASSISTANT

## 2024-10-28 PROCEDURE — 25010000002 PROPOFOL 200 MG/20ML EMULSION: Performed by: NURSE ANESTHETIST, CERTIFIED REGISTERED

## 2024-10-28 PROCEDURE — 94799 UNLISTED PULMONARY SVC/PX: CPT

## 2024-10-28 PROCEDURE — 25010000002 LIDOCAINE PF 2% 2 % SOLUTION: Performed by: NURSE ANESTHETIST, CERTIFIED REGISTERED

## 2024-10-28 PROCEDURE — 25810000003 LACTATED RINGERS PER 1000 ML: Performed by: ANESTHESIOLOGY

## 2024-10-28 PROCEDURE — C1713 ANCHOR/SCREW BN/BN,TIS/BN: HCPCS | Performed by: ORTHOPAEDIC SURGERY

## 2024-10-28 PROCEDURE — C1769 GUIDE WIRE: HCPCS | Performed by: ORTHOPAEDIC SURGERY

## 2024-10-28 PROCEDURE — 25010000002 CEFAZOLIN PER 500 MG: Performed by: ORTHOPAEDIC SURGERY

## 2024-10-28 PROCEDURE — 25010000002 FENTANYL CITRATE (PF) 50 MCG/ML SOLUTION: Performed by: ANESTHESIOLOGY

## 2024-10-28 PROCEDURE — 25010000002 HYDROMORPHONE 1 MG/ML SOLUTION: Performed by: NURSE ANESTHETIST, CERTIFIED REGISTERED

## 2024-10-28 PROCEDURE — 25810000003 SODIUM CHLORIDE 0.9 % SOLUTION: Performed by: ORTHOPAEDIC SURGERY

## 2024-10-28 PROCEDURE — 94761 N-INVAS EAR/PLS OXIMETRY MLT: CPT

## 2024-10-28 PROCEDURE — 25010000002 ROPIVACAINE PER 1 MG: Performed by: ANESTHESIOLOGY

## 2024-10-28 PROCEDURE — 25010000002 ONDANSETRON PER 1 MG: Performed by: NURSE ANESTHETIST, CERTIFIED REGISTERED

## 2024-10-28 DEVICE — SUT FIBERTAPE FW 2MM 7IN BLU AR72377: Type: IMPLANTABLE DEVICE | Site: PATELLA | Status: FUNCTIONAL

## 2024-10-28 DEVICE — CANNULATED SCREW
Type: IMPLANTABLE DEVICE | Site: PATELLA | Status: FUNCTIONAL
Brand: ASNIS

## 2024-10-28 RX ORDER — ALLOPURINOL 300 MG/1
300 TABLET ORAL NIGHTLY
Status: DISCONTINUED | OUTPATIENT
Start: 2024-10-28 | End: 2024-10-28 | Stop reason: DRUGHIGH

## 2024-10-28 RX ORDER — OXYCODONE HYDROCHLORIDE 5 MG/1
5 TABLET ORAL
Status: DISCONTINUED | OUTPATIENT
Start: 2024-10-28 | End: 2024-10-28 | Stop reason: HOSPADM

## 2024-10-28 RX ORDER — NALOXONE HCL 0.4 MG/ML
0.4 VIAL (ML) INJECTION
Status: DISCONTINUED | OUTPATIENT
Start: 2024-10-28 | End: 2024-11-01 | Stop reason: HOSPADM

## 2024-10-28 RX ORDER — CARVEDILOL 3.12 MG/1
3.12 TABLET ORAL 2 TIMES DAILY
Status: DISCONTINUED | OUTPATIENT
Start: 2024-10-28 | End: 2024-11-01 | Stop reason: HOSPADM

## 2024-10-28 RX ORDER — LIDOCAINE HYDROCHLORIDE 20 MG/ML
INJECTION, SOLUTION EPIDURAL; INFILTRATION; INTRACAUDAL; PERINEURAL AS NEEDED
Status: DISCONTINUED | OUTPATIENT
Start: 2024-10-28 | End: 2024-10-28 | Stop reason: SURG

## 2024-10-28 RX ORDER — HYDROCODONE BITARTRATE AND ACETAMINOPHEN 7.5; 325 MG/1; MG/1
2 TABLET ORAL EVERY 4 HOURS PRN
Status: DISCONTINUED | OUTPATIENT
Start: 2024-10-28 | End: 2024-11-01 | Stop reason: HOSPADM

## 2024-10-28 RX ORDER — MAGNESIUM HYDROXIDE 1200 MG/15ML
LIQUID ORAL AS NEEDED
Status: DISCONTINUED | OUTPATIENT
Start: 2024-10-28 | End: 2024-10-28 | Stop reason: HOSPADM

## 2024-10-28 RX ORDER — PROMETHAZINE HYDROCHLORIDE 25 MG/1
25 SUPPOSITORY RECTAL ONCE AS NEEDED
Status: DISCONTINUED | OUTPATIENT
Start: 2024-10-28 | End: 2024-10-28 | Stop reason: HOSPADM

## 2024-10-28 RX ORDER — ALLOPURINOL 100 MG/1
50 TABLET ORAL NIGHTLY
Status: DISCONTINUED | OUTPATIENT
Start: 2024-10-28 | End: 2024-11-01 | Stop reason: HOSPADM

## 2024-10-28 RX ORDER — PROPOFOL 10 MG/ML
INJECTION, EMULSION INTRAVENOUS AS NEEDED
Status: DISCONTINUED | OUTPATIENT
Start: 2024-10-28 | End: 2024-10-28 | Stop reason: SURG

## 2024-10-28 RX ORDER — HYDROCODONE BITARTRATE AND ACETAMINOPHEN 7.5; 325 MG/1; MG/1
1 TABLET ORAL EVERY 4 HOURS PRN
Status: DISCONTINUED | OUTPATIENT
Start: 2024-10-28 | End: 2024-11-01 | Stop reason: HOSPADM

## 2024-10-28 RX ORDER — FAMOTIDINE 20 MG/1
20 TABLET, FILM COATED ORAL 2 TIMES DAILY
Status: DISCONTINUED | OUTPATIENT
Start: 2024-10-28 | End: 2024-11-01 | Stop reason: HOSPADM

## 2024-10-28 RX ORDER — ONDANSETRON 2 MG/ML
4 INJECTION INTRAMUSCULAR; INTRAVENOUS EVERY 6 HOURS PRN
Status: DISCONTINUED | OUTPATIENT
Start: 2024-10-28 | End: 2024-11-01 | Stop reason: HOSPADM

## 2024-10-28 RX ORDER — FENTANYL CITRATE 50 UG/ML
INJECTION, SOLUTION INTRAMUSCULAR; INTRAVENOUS AS NEEDED
Status: DISCONTINUED | OUTPATIENT
Start: 2024-10-28 | End: 2024-10-28 | Stop reason: SURG

## 2024-10-28 RX ORDER — SODIUM CHLORIDE, SODIUM LACTATE, POTASSIUM CHLORIDE, CALCIUM CHLORIDE 600; 310; 30; 20 MG/100ML; MG/100ML; MG/100ML; MG/100ML
9 INJECTION, SOLUTION INTRAVENOUS CONTINUOUS PRN
Status: DISCONTINUED | OUTPATIENT
Start: 2024-10-28 | End: 2024-10-28 | Stop reason: HOSPADM

## 2024-10-28 RX ORDER — ROPIVACAINE HYDROCHLORIDE 5 MG/ML
INJECTION, SOLUTION EPIDURAL; INFILTRATION; PERINEURAL
Status: COMPLETED | OUTPATIENT
Start: 2024-10-28 | End: 2024-10-28

## 2024-10-28 RX ORDER — FENTANYL CITRATE 50 UG/ML
INJECTION, SOLUTION INTRAMUSCULAR; INTRAVENOUS
Status: COMPLETED
Start: 2024-10-28 | End: 2024-10-28

## 2024-10-28 RX ORDER — MORPHINE SULFATE 2 MG/ML
2 INJECTION, SOLUTION INTRAMUSCULAR; INTRAVENOUS EVERY 4 HOURS PRN
Status: DISCONTINUED | OUTPATIENT
Start: 2024-10-28 | End: 2024-11-01 | Stop reason: HOSPADM

## 2024-10-28 RX ORDER — ALBUTEROL SULFATE 0.83 MG/ML
2.5 SOLUTION RESPIRATORY (INHALATION) EVERY 6 HOURS PRN
Status: DISCONTINUED | OUTPATIENT
Start: 2024-10-28 | End: 2024-11-01 | Stop reason: HOSPADM

## 2024-10-28 RX ORDER — PHENYLEPHRINE HCL IN 0.9% NACL 1 MG/10 ML
SYRINGE (ML) INTRAVENOUS AS NEEDED
Status: DISCONTINUED | OUTPATIENT
Start: 2024-10-28 | End: 2024-10-28 | Stop reason: SURG

## 2024-10-28 RX ORDER — MIDAZOLAM HYDROCHLORIDE 2 MG/2ML
1 INJECTION, SOLUTION INTRAMUSCULAR; INTRAVENOUS ONCE
Status: COMPLETED | OUTPATIENT
Start: 2024-10-28 | End: 2024-10-28

## 2024-10-28 RX ORDER — ONDANSETRON 2 MG/ML
4 INJECTION INTRAMUSCULAR; INTRAVENOUS ONCE AS NEEDED
Status: DISCONTINUED | OUTPATIENT
Start: 2024-10-28 | End: 2024-10-28 | Stop reason: HOSPADM

## 2024-10-28 RX ORDER — ACETAMINOPHEN 325 MG/1
325 TABLET ORAL EVERY 4 HOURS PRN
Status: DISCONTINUED | OUTPATIENT
Start: 2024-10-28 | End: 2024-11-01 | Stop reason: HOSPADM

## 2024-10-28 RX ORDER — PROMETHAZINE HYDROCHLORIDE 12.5 MG/1
25 TABLET ORAL ONCE AS NEEDED
Status: DISCONTINUED | OUTPATIENT
Start: 2024-10-28 | End: 2024-10-28 | Stop reason: HOSPADM

## 2024-10-28 RX ORDER — ATORVASTATIN CALCIUM 40 MG/1
40 TABLET, FILM COATED ORAL NIGHTLY
Status: DISCONTINUED | OUTPATIENT
Start: 2024-10-28 | End: 2024-11-01 | Stop reason: HOSPADM

## 2024-10-28 RX ORDER — ONDANSETRON 2 MG/ML
INJECTION INTRAMUSCULAR; INTRAVENOUS AS NEEDED
Status: DISCONTINUED | OUTPATIENT
Start: 2024-10-28 | End: 2024-10-28 | Stop reason: SURG

## 2024-10-28 RX ORDER — HYDRALAZINE HYDROCHLORIDE 25 MG/1
50 TABLET, FILM COATED ORAL 2 TIMES DAILY
Status: DISCONTINUED | OUTPATIENT
Start: 2024-10-28 | End: 2024-11-01 | Stop reason: HOSPADM

## 2024-10-28 RX ORDER — FERROUS SULFATE 325(65) MG
325 TABLET ORAL
Status: DISCONTINUED | OUTPATIENT
Start: 2024-10-29 | End: 2024-11-01 | Stop reason: HOSPADM

## 2024-10-28 RX ORDER — SODIUM CHLORIDE 9 MG/ML
100 INJECTION, SOLUTION INTRAVENOUS CONTINUOUS
Status: DISCONTINUED | OUTPATIENT
Start: 2024-10-28 | End: 2024-10-29

## 2024-10-28 RX ORDER — DEXAMETHASONE SODIUM PHOSPHATE 4 MG/ML
INJECTION, SOLUTION INTRA-ARTICULAR; INTRALESIONAL; INTRAMUSCULAR; INTRAVENOUS; SOFT TISSUE AS NEEDED
Status: DISCONTINUED | OUTPATIENT
Start: 2024-10-28 | End: 2024-10-28 | Stop reason: SURG

## 2024-10-28 RX ORDER — ACETAMINOPHEN 500 MG
1000 TABLET ORAL ONCE
Status: COMPLETED | OUTPATIENT
Start: 2024-10-28 | End: 2024-10-28

## 2024-10-28 RX ADMIN — Medication 100 MCG: at 13:19

## 2024-10-28 RX ADMIN — ROPIVACAINE HYDROCHLORIDE 30 ML: 5 INJECTION, SOLUTION EPIDURAL; INFILTRATION; PERINEURAL at 12:14

## 2024-10-28 RX ADMIN — PROPOFOL 140 MG: 10 INJECTION, EMULSION INTRAVENOUS at 12:42

## 2024-10-28 RX ADMIN — FAMOTIDINE 20 MG: 20 TABLET ORAL at 23:24

## 2024-10-28 RX ADMIN — ATORVASTATIN CALCIUM 40 MG: 40 TABLET, FILM COATED ORAL at 23:24

## 2024-10-28 RX ADMIN — PROPOFOL 60 MG: 10 INJECTION, EMULSION INTRAVENOUS at 13:10

## 2024-10-28 RX ADMIN — ACETAMINOPHEN 1000 MG: 500 TABLET ORAL at 11:55

## 2024-10-28 RX ADMIN — Medication 100 MCG: at 13:04

## 2024-10-28 RX ADMIN — FENTANYL CITRATE 50 MCG: 50 INJECTION, SOLUTION INTRAMUSCULAR; INTRAVENOUS at 12:13

## 2024-10-28 RX ADMIN — SODIUM CHLORIDE 100 ML/HR: 9 INJECTION, SOLUTION INTRAVENOUS at 16:31

## 2024-10-28 RX ADMIN — DEXAMETHASONE SODIUM PHOSPHATE 8 MG: 4 INJECTION, SOLUTION INTRAMUSCULAR; INTRAVENOUS at 12:42

## 2024-10-28 RX ADMIN — CARVEDILOL 3.12 MG: 3.12 TABLET, FILM COATED ORAL at 23:25

## 2024-10-28 RX ADMIN — Medication 100 MCG: at 12:58

## 2024-10-28 RX ADMIN — ALLOPURINOL 50 MG: 100 TABLET ORAL at 23:25

## 2024-10-28 RX ADMIN — HYDROMORPHONE HYDROCHLORIDE 0.5 MG: 1 INJECTION, SOLUTION INTRAMUSCULAR; INTRAVENOUS; SUBCUTANEOUS at 14:30

## 2024-10-28 RX ADMIN — Medication 100 MCG: at 12:53

## 2024-10-28 RX ADMIN — MORPHINE SULFATE 2 MG: 2 INJECTION, SOLUTION INTRAMUSCULAR; INTRAVENOUS at 22:11

## 2024-10-28 RX ADMIN — HYDRALAZINE HYDROCHLORIDE 50 MG: 25 TABLET ORAL at 23:24

## 2024-10-28 RX ADMIN — SODIUM CHLORIDE 2 G: 9 INJECTION, SOLUTION INTRAVENOUS at 12:47

## 2024-10-28 RX ADMIN — FENTANYL CITRATE 50 MCG: 50 INJECTION, SOLUTION INTRAMUSCULAR; INTRAVENOUS at 13:12

## 2024-10-28 RX ADMIN — FENTANYL CITRATE 50 MCG: 50 INJECTION, SOLUTION INTRAMUSCULAR; INTRAVENOUS at 12:08

## 2024-10-28 RX ADMIN — OXYCODONE HYDROCHLORIDE 5 MG: 5 TABLET ORAL at 14:13

## 2024-10-28 RX ADMIN — ONDANSETRON 4 MG: 2 INJECTION INTRAMUSCULAR; INTRAVENOUS at 12:42

## 2024-10-28 RX ADMIN — LIDOCAINE HYDROCHLORIDE 80 MG: 20 INJECTION, SOLUTION EPIDURAL; INFILTRATION; INTRACAUDAL; PERINEURAL at 12:42

## 2024-10-28 RX ADMIN — Medication 50 MCG: at 13:22

## 2024-10-28 RX ADMIN — SODIUM CHLORIDE, POTASSIUM CHLORIDE, SODIUM LACTATE AND CALCIUM CHLORIDE 9 ML/HR: 600; 310; 30; 20 INJECTION, SOLUTION INTRAVENOUS at 11:54

## 2024-10-28 RX ADMIN — SODIUM CHLORIDE 2000 MG: 9 INJECTION, SOLUTION INTRAVENOUS at 20:40

## 2024-10-28 RX ADMIN — MIDAZOLAM HYDROCHLORIDE 1 MG: 1 INJECTION, SOLUTION INTRAMUSCULAR; INTRAVENOUS at 12:10

## 2024-10-28 NOTE — BRIEF OP NOTE
PATELLA OPEN REDUCTION INTERNAL FIXATION  Progress Note    Gilberto VUONG Tommy  10/28/2024    Pre-op Diagnosis:   Closed displaced fracture of right patella, unspecified fracture morphology, initial encounter [S82.001A]       Post-Op Diagnosis Codes:     * Closed displaced fracture of right patella, unspecified fracture morphology, initial encounter [S82.001A]    Procedure/CPT® Codes:        Procedure(s):  Right PATELLA OPEN REDUCTION INTERNAL FIXATION              Surgeon(s):  Aníbal Montoya MD    Anesthesia: Choice    Staff:   Circulator: Jaleesa Rodriguez RN  Radiology Technologist: Re Walker  Scrub Person: Veronique Vazquez  Vendor Representative: Juventino Parsons  Assistant: Immanuel Carmichael RN  Assistant: Immanuel Carmichael RN      Estimated Blood Loss:  10cc    Urine Voided: * No values recorded between 10/28/2024 12:36 PM and 10/28/2024  1:46 PM *    Specimens:                None          Drains: * No LDAs found *    Findings: Patella fracture        Complications: None    Assistant: Immanuel Carmichael RN  was responsible for performing the following activities: Retraction, Suction, Irrigation, and Placing Dressing and their skilled assistance was necessary for the success of this case.    Aníbal Montoya MD     Date: 10/28/2024  Time: 13:50 EDT
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
## 2024-10-28 NOTE — PLAN OF CARE
Goal Outcome Evaluation:              Outcome Evaluation: Patient alert and oriented x4, calm, cooperative, and pleasant.  VS WNL.  LCTA.  No complaints of pain since arriving on the unit.  Patient states block is still effective.  R knee dsg intact and secured with ace wrap.  Knee immobilizer in place and to remain in place in locked position.  Daughter at bedside and supportive of patient.  Patient currently resting in bed with call light in reach.

## 2024-10-28 NOTE — ANESTHESIA PROCEDURE NOTES
Peripheral Block    Pre-sedation assessment completed: 10/28/2024 12:05 PM    Patient reassessed immediately prior to procedure    Patient location during procedure: pre-op  Start time: 10/28/2024 12:08 PM  Stop time: 10/28/2024 12:14 PM  Reason for block: at surgeon's request and post-op pain management  Preanesthetic Checklist  Completed: patient identified, IV checked, site marked, risks and benefits discussed, surgical consent, monitors and equipment checked, pre-op evaluation and timeout performed  Prep:  Pt Position: supine  Sterile barriers:alcohol skin prep, partial drape, cap, washed/disinfected hands, mask and gloves  Prep: ChloraPrep  Patient monitoring: blood pressure monitoring, continuous pulse oximetry and EKG  Procedure    Sedation: yes  Performed under: local infiltration  Guidance:ultrasound guided and nerve stimulator    ULTRASOUND INTERPRETATION.  Using ultrasound guidance a 20 G gauge needle was placed in close proximity to the nerve, at which point, under ultrasound guidance anesthetic was injected in the area of the nerve and spread of the anesthesia was seen on ultrasound in close proximity thereto.  There were no abnormalities seen on ultrasound; a digital image was taken; and the patient tolerated the procedure with no complications. Images:still images obtained, printed/placed on chart    Laterality:right  Block Type:adductor canal block  Injection Technique:single-shot  Needle Type:echogenic  Needle Gauge:20 G (4in)  Resistance on Injection: none    Medications Used: ropivacaine (NAROPIN) 0.5 % injection - Injection   30 mL - 10/28/2024 12:14:00 PM      Post Assessment  Injection Assessment: negative aspiration for heme, no paresthesia on injection and incremental injection  Patient Tolerance:comfortable throughout block  Complications:no  Additional Notes  The block or continuous infusion is requested by the referring physician for management of postoperative pain, or pain related to a  procedure. Ultrasound guidance (deemed medically necessary). Painless injection, pt was awake and conversant during the procedure without complications. Needle and surrounding structures visualized throughout procedure. No adverse reactions or complications seen during this period. Post-procedure image showed no signs of complication, and anatomy was consistent with an uncomplicated nerve blockade.  Performed by: Janice Toledo MD

## 2024-10-28 NOTE — OP NOTE
PATELLA OPEN REDUCTION INTERNAL FIXATION  Procedure Report    Patient Name:  Gilberto Sanders  YOB: 1950    Date of Surgery:  10/28/2024     Indications:  Patient sustained a patella fracture after injury. Patient wishes to undergo operative treatment. Risks and benefits of operative treatment including bleeding, infection, damage to neurovascular structures, continued pain and disability, maulnion, non-union, need for additional procedures including hardware removal, among others. Informed consent was obtained and they wished to proceed.    Pre-op Diagnosis:   Closed displaced fracture of right patella, unspecified fracture morphology, initial encounter [S82.001A]       Post-Op Diagnosis Codes:     * Closed displaced fracture of right patella, unspecified fracture morphology, initial encounter [S82.001A]    Procedure/CPT® Codes:      Procedure(s):  Right PATELLA OPEN REDUCTION INTERNAL FIXATION    Staff:  Surgeon(s):  Aníbal Montoya MD    Assistant: Immanuel Carmichael RN    Anesthesia: Choice    Estimated Blood Loss:  10cc    Implants:    Implant Name Type Inv. Item Serial No.  Lot No. LRB No. Used Action   SUT FIBERTAPE FW 2MM 7IN ADALGISA [id number] - [id number] Implant SUT FIBERTAPE FW 2MM 7IN ADALGISA [id number]  [employer] [id number] Right 1 Implanted   SCRW DILIP ASNIS3 1/3THRD TI [id number] - [id number] Implant SCRW DILIP ASNIS3 1/3THRD TI [id number]  JAYLENE FCO 0 Right 2 Implanted       Specimen:          None        Findings: Patella fx    Complications: None    Description of Procedure: The op site was marked the preoperative holding area.  The patient was brought the operating room placed upon the OR table.  Preoperatively a nerve blocks performed by anesthesia.  The nonsterile tourniquet was placed on the right thigh.  An SCD boot was placed on the left leg.  The extremity was placed on an elevated foam ramp.  The extremity was prepped and draped in usual sterile fashion.  Preoperative antibiotic  was given.  A formal timeout was held.  The limb was exsanguinated with an Esmarch and the tourniquet was inflated.  A longitudinal incision was made over the anterior knee.  The subcutaneous tissues and fascia were divided.  The patella fracture was admitted patella mildly displaced fracture.  The medial and lateral retinacular tissues were intact.  The fracture site was irrigated and cleaned.  It was reduced with a point-to-point reduction clamp and two 4.0 cannulated screws were placed from distal to proximal parallel across the patella.  K wires were placed first and confirmed by fluoroscopy.  The screw lengths were measured and left little short of the proximal cortex.  The cannulated screws were inserted.  The K wires were removed and a suture passer was used to pass a fiber tape suture in a figure-of-eight fashion through the screws.  The fiber tape suture was tied with alternating half hitch knots to secure the tension band construct.  The fracture was well reduced and well-fixed.  Final fluoroscopic images showed the hardware in good position and the fracture well reduced.  This point the wound was irrigated and the tourniquet was released.  Bleeding is minimal.  The subtenons tissues were closed with 2-0 Vicryl and skin with staples.  An Aquacel dressing was placed.  4 x 4's, soft roll, Ace wrap and an ACL brace with the knee locked in extension was placed.  The patient will from anesthesia in stable condition.  There are no complications and all counts were correct.  The patient was stable to recovery.    Assistant: Immanuel Carmichael RN  was responsible for performing the following activities: Retraction, Suction, Irrigation, and Placing Dressing and their skilled assistance was necessary for the success of this case.    Aníbal Montoya MD     Date: 10/28/2024  Time: 15:11 EDT

## 2024-10-28 NOTE — ANESTHESIA PREPROCEDURE EVALUATION
Anesthesia Evaluation     Patient summary reviewed and Nursing notes reviewed   no history of anesthetic complications:   NPO Solid Status: > 8 hours  NPO Liquid Status: > 2 hours           Airway   Mallampati: II  TM distance: >3 FB  Neck ROM: full  No difficulty expected  Dental          Pulmonary - negative pulmonary ROS and normal exam    breath sounds clear to auscultation  Cardiovascular - normal exam  Exercise tolerance: good (4-7 METS)    Rhythm: regular  Rate: normal    (+) hypertension (coreg), past MI , CAD, CABG >6 Months, cardiac stents more than 12 months ago , dysrhythmias (atrial fibrillation status post PVI on 6/30/2023) Paroxysmal Atrial Fib, hyperlipidemia    ROS comment: Echo (1/2/24; dyspnea):  ·  Left ventricular systolic function is low normal. Calculated left ventricular EF = 46.5%  ·  Left ventricular wall thickness is consistent with borderline eccentric hypertrophy.  ·  Left ventricular diastolic function is consistent with (grade I) impaired relaxation.         Neuro/Psych- negative ROS  GI/Hepatic/Renal/Endo    (+) renal disease-    Musculoskeletal (-) negative ROS    Abdominal  - normal exam   Substance History - negative use     OB/GYN negative ob/gyn ROS         Other      history of cancer    ROS/Med Hx Other: >4METS, HX CABG 10V 2017. 3 STENTS 1984, LAST CARDS OV 10/16/24, TO F/U ANNALLY/YEARLY. DENIES C/P, SOA. KT                     Anesthesia Plan    ASA 3     general and regional     (Patient understands anesthesia not responsible for dental damage. Regional anesthesia options discussed with patient. Pt accepts regional block.)  intravenous induction     Anesthetic plan, risks, benefits, and alternatives have been provided, discussed and informed consent has been obtained with: patient.    Use of blood products discussed with patient .    Plan discussed with CRNA.        CODE STATUS:

## 2024-10-28 NOTE — ANESTHESIA POSTPROCEDURE EVALUATION
Patient: Gilberto Sanders    Procedure Summary       Date: 10/28/24 Room / Location: [facility]  / [facility]    Anesthesia Start: 1237 Anesthesia Stop: 1349    Procedure: PATELLA OPEN REDUCTION INTERNAL FIXATION (Right: Knee) Diagnosis:       Closed displaced fracture of right patella, unspecified fracture morphology, initial encounter      (Closed displaced fracture of right patella, unspecified fracture morphology, initial encounter [S82.001A])    Surgeons: Aníbal Montoya MD Provider: Janice Toledo MD    Anesthesia Type: general, regional ASA Status: 3            Anesthesia Type: general, regional    Vitals  Vitals Value Taken Time   /83 10/28/24 1419   Temp 36.9 °C (98.4 °F) 10/28/24 1347   Pulse 60 10/28/24 1426   Resp 14 10/28/24 1347   SpO2 94 % 10/28/24 1426   Vitals shown include unfiled device data.        Post Anesthesia Care and Evaluation    Patient location during evaluation: bedside  Patient participation: complete - patient participated  Level of consciousness: awake  Pain management: adequate    Airway patency: patent  PONV Status: none  Cardiovascular status: acceptable and stable  Respiratory status: acceptable  Hydration status: acceptable

## 2024-10-29 LAB
ALBUMIN SERPL-MCNC: 3.7 G/DL (ref 3.5–5.2)
ANION GAP SERPL CALCULATED.3IONS-SCNC: 11.7 MMOL/L (ref 5–15)
BUN SERPL-MCNC: 42 MG/DL (ref 8–23)
BUN/CREAT SERPL: 24.1 (ref 7–25)
CALCIUM SPEC-SCNC: 9.5 MG/DL (ref 8.6–10.5)
CHLORIDE SERPL-SCNC: 103 MMOL/L (ref 98–107)
CO2 SERPL-SCNC: 21.3 MMOL/L (ref 22–29)
CREAT SERPL-MCNC: 1.74 MG/DL (ref 0.76–1.27)
EGFRCR SERPLBLD CKD-EPI 2021: 40.6 ML/MIN/1.73
GLUCOSE BLDC GLUCOMTR-MCNC: 94 MG/DL (ref 70–99)
GLUCOSE SERPL-MCNC: 126 MG/DL (ref 65–99)
PHOSPHATE SERPL-MCNC: 3.8 MG/DL (ref 2.5–4.5)
POTASSIUM SERPL-SCNC: 4.7 MMOL/L (ref 3.5–5.2)
SODIUM SERPL-SCNC: 136 MMOL/L (ref 136–145)

## 2024-10-29 PROCEDURE — 97535 SELF CARE MNGMENT TRAINING: CPT

## 2024-10-29 PROCEDURE — 25010000002 CEFAZOLIN PER 500 MG: Performed by: ORTHOPAEDIC SURGERY

## 2024-10-29 PROCEDURE — 82948 REAGENT STRIP/BLOOD GLUCOSE: CPT

## 2024-10-29 PROCEDURE — 94799 UNLISTED PULMONARY SVC/PX: CPT

## 2024-10-29 PROCEDURE — 80069 RENAL FUNCTION PANEL: CPT | Performed by: PHYSICIAN ASSISTANT

## 2024-10-29 PROCEDURE — 97165 OT EVAL LOW COMPLEX 30 MIN: CPT

## 2024-10-29 PROCEDURE — 97161 PT EVAL LOW COMPLEX 20 MIN: CPT

## 2024-10-29 PROCEDURE — 99213 OFFICE O/P EST LOW 20 MIN: CPT | Performed by: PHYSICIAN ASSISTANT

## 2024-10-29 RX ADMIN — HYDRALAZINE HYDROCHLORIDE 50 MG: 25 TABLET ORAL at 09:26

## 2024-10-29 RX ADMIN — HYDROCODONE BITARTRATE AND ACETAMINOPHEN 1 TABLET: 7.5; 325 TABLET ORAL at 06:03

## 2024-10-29 RX ADMIN — FERROUS SULFATE TAB 325 MG (65 MG ELEMENTAL FE) 325 MG: 325 (65 FE) TAB at 09:26

## 2024-10-29 RX ADMIN — CARVEDILOL 3.12 MG: 3.12 TABLET, FILM COATED ORAL at 09:26

## 2024-10-29 RX ADMIN — SODIUM CHLORIDE 2000 MG: 9 INJECTION, SOLUTION INTRAVENOUS at 05:54

## 2024-10-29 RX ADMIN — HYDRALAZINE HYDROCHLORIDE 50 MG: 25 TABLET ORAL at 20:38

## 2024-10-29 RX ADMIN — ATORVASTATIN CALCIUM 40 MG: 40 TABLET, FILM COATED ORAL at 20:38

## 2024-10-29 RX ADMIN — FAMOTIDINE 20 MG: 20 TABLET ORAL at 20:40

## 2024-10-29 RX ADMIN — HYDROCODONE BITARTRATE AND ACETAMINOPHEN 2 TABLET: 7.5; 325 TABLET ORAL at 11:38

## 2024-10-29 RX ADMIN — FAMOTIDINE 20 MG: 20 TABLET ORAL at 09:26

## 2024-10-29 RX ADMIN — APIXABAN 2.5 MG: 2.5 TABLET, FILM COATED ORAL at 09:26

## 2024-10-29 RX ADMIN — HYDROCODONE BITARTRATE AND ACETAMINOPHEN 1 TABLET: 7.5; 325 TABLET ORAL at 20:39

## 2024-10-29 RX ADMIN — CARVEDILOL 3.12 MG: 3.12 TABLET, FILM COATED ORAL at 20:39

## 2024-10-29 RX ADMIN — APIXABAN 2.5 MG: 2.5 TABLET, FILM COATED ORAL at 20:40

## 2024-10-29 RX ADMIN — ALLOPURINOL 50 MG: 100 TABLET ORAL at 20:39

## 2024-10-29 NOTE — PLAN OF CARE
Goal Outcome Evaluation:  Plan of Care Reviewed With: (P) patient        Progress: (P) improving  Outcome Evaluation: (P) Patient presents AOx3 and showed fair tolerance throughout session. Patient participated in ADL tasks targeting balance with weight-bearing precautions in preparation for ADL completion at home. Patient shows improvement in balance with transfers and dynamic standing tasks, requiring less assistance to support stance. Patient demonstrates need for improvement in ROM and balance with seated dynamic tasks, inhibiting independence with dressing ADLs. Patient shows need for improvement with functional mobility and transfers from lower surfaces, inhibiting independence in ADLs. Patient would benefit from skilled OT intervention addressing deficits to maximize independence in ADLs.    Anticipated Discharge Disposition (OT): (P) skilled nursing facility

## 2024-10-29 NOTE — THERAPY EVALUATION
Acute Care - Physical Therapy Initial Evaluation   [facility]     Patient Name: Gilberto Sanders  : 1950  MRN: [id number]  Today's Date: 10/29/2024      Admit date: 10/28/2024     Referring Physician: Bebeto Ventura MD     Surgery Date:10/28/2024   Procedure(s) (LRB):  PATELLA OPEN REDUCTION INTERNAL FIXATION (Right) ;      Visit Dx:     ICD-10-CM ICD-9-CM   1. Difficulty in walking  R26.2 719.7   2. Closed displaced fracture of right patella, unspecified fracture morphology, initial encounter  S82.001A 822.0     Patient Active Problem List   Diagnosis    S/P CABG x10 by Dr. Kruger    Coronary heart disease    Hyperlipidemia    Hypertension    Skin disorder    Status post coronary artery stent placement    Status post coronary artery bypass graft    Paroxysmal atrial fibrillation    Sick sinus syndrome    Acute respiratory failure with hypoxia    Closed displaced fracture of right patella    Right patella fracture     Past Medical History:   Diagnosis Date    -2023    Chronic kidney disease (CKD)     Coronary artery disease     Hyperlipidemia     Hypertension     Multiple rib fractures     from a fall early spring 2023, healed without intervention    Myocardial infarction     RSV (acute bronchiolitis due to respiratory syncytial virus) 2024    Skin cancer     back, chest, arms (lots of things burned off)     Past Surgical History:   Procedure Laterality Date    ABLATION OF DYSRHYTHMIC FOCUS      APPENDECTOMY      CARDIAC ELECTROPHYSIOLOGY PROCEDURE N/A 2023    Procedure: Ablation atrial fibrillation, RF, rep emailed;  Surgeon: Adrian Valentino MD;  Location: [facility] INVASIVE LOCATION;  Service: Cardiovascular;  Laterality: N/A;    CORONARY ANGIOPLASTY      CORONARY ARTERY BYPASS GRAFT  08/30/2017    X10 Dr Kruger    CORONARY STENT PLACEMENT  1984    3 stent    RENAL ARTERY STENT      SKIN CANCER EXCISION       PT Assessment (Last 12 Hours)       PT Evaluation and Treatment        Row Name 10/29/24 1000          Physical Therapy Time and Intention    Subjective Information no complaints  -YANELI     Document Type evaluation  -YANELI     Mode of Treatment individual therapy;physical therapy  -YANELI     Patient Effort good  -YANELI       Row Name 10/29/24 1000          General Information    Patient Observations alert;cooperative;agree to therapy  -YANELI     Prior Level of Function independent:;all household mobility;community mobility  -YANELI     Equipment Currently Used at Home none  -YANELI     Existing Precautions/Restrictions fall;weight bearing;brace on at all times  Brace locked in full extension but ok for 0-30° with PT  -YANELI     Barriers to Rehab none identified  -YANELI       Row Name 10/29/24 1000          Living Environment    Current Living Arrangements home  -YANELI     People in Home alone  -YANELI       Row Name 10/29/24 1000          Range of Motion (ROM)    Range of Motion ROM is WFL  except RLE limited by ortho precautions  -YANELI       Row Name 10/29/24 1000          Strength (Manual Muscle Testing)    Strength (Manual Muscle Testing) strength is WFL  Except R knee not assessed due to recent sx  -YANELI       Row Name 10/29/24 1000          Mobility    Extremity Weight-bearing Status right lower extremity  -YANELI     Right Lower Extremity (Weight-bearing Status) weight-bearing as tolerated (WBAT)  -YANELI       Row Name 10/29/24 1000          Bed Mobility    Bed Mobility bed mobility (all) activities;supine-sit  -YANELI     All Activities, [place] (Bed Mobility) minimum assist (75% patient effort)  -YANELI     Supine-Sit [place] (Bed Mobility) minimum assist (75% patient effort)  -YANELI       Row Name 10/29/24 1000          Transfers    Transfers bed-chair transfer;sit-stand transfer  -YANELI       Row Name 10/29/24 1000          Bed-Chair Transfer    Bed-Chair [place] (Transfers) minimum assist (75% patient effort)  -YANELI     Assistive Device (Bed-Chair Transfers) walker, front-wheeled  -YANELI       Row Name 10/29/24 1000           Sit-Stand Transfer    Sit-Stand [place] (Transfers) moderate assist (50% patient effort)  -YANELI     Assistive Device (Sit-Stand Transfers) walker, front-wheeled  -YANELI       Row Name 10/29/24 1000          Gait/Stairs (Locomotion)    Gait/Stairs Locomotion gait/ambulation assistive device  -YANELI     [place] Level (Gait) contact guard  -YANELI     Assistive Device (Gait) walker, front-wheeled  -YANELI     Patient was able to Ambulate yes  -YANELI     Distance in Feet (Gait) 75  -YANELI     Pattern (Gait) step-to  -YANELI       Row Name 10/29/24 1000          Safety Issues/Impairments Affecting Functional Mobility    Impairments Affecting Function (Mobility) balance;pain;range of motion (ROM);strength  -YANELI       Row Name 10/29/24 1000          Balance    Balance Assessment standing dynamic balance  -YANELI     Dynamic Standing Balance contact guard  -YANELI     Position/Device Used, Standing Balance walker, front-wheeled  -YANELI       Row Name             Wound 10/28/24 Right anterior knee    Wound - Properties Group Placement Date: 10/28/24  -JM Side: Right  -JM Orientation: anterior  -JM Location: knee  -JM Primary Wound Type: Incision  -JM    Retired Wound - Properties Group Placement Date: 10/28/24  -JM Side: Right  -JM Orientation: anterior  -JM Location: knee  -JM Primary Wound Type: Incision  -JM    Retired Wound - Properties Group Placement Date: 10/28/24  -JM Side: Right  -JM Orientation: anterior  -JM Location: knee  -JM Primary Wound Type: Incision  -JM    Retired Wound - Properties Group Date first assessed: 10/28/24  -JM Side: Right  -JM Location: knee  -JM Primary Wound Type: Incision  -JM      Row Name 10/29/24 1000          Plan of Care Review    Plan of Care Reviewed With patient  -YANELI     Outcome Evaluation Patient presents with decreased strength, transfers and ambulation.  Skilled physical therapy services will be required to address these mobility deficits.  -YANELI       Row Name 10/29/24 1000          Therapy  Assessment/Plan (PT)    Rehab Potential (PT) good  -YANELI     Criteria for Skilled Interventions Met (PT) skilled treatment is necessary  -YANELI     Therapy Frequency (PT) daily  -YANELI     Predicted Duration of Therapy Intervention (PT) 10 days  -YANELI     Problem List (PT) problems related to;balance;mobility;strength;pain  -YANELI     Activity Limitations Related to Problem List (PT) unable to transfer safely;unable to ambulate safely  -YANELI       Row Name 10/29/24 1000          PT Evaluation Complexity    History, PT Evaluation Complexity no personal factors and/or comorbidities  -YANELI     Examination of Body Systems (PT Eval Complexity) total of 4 or more elements  -YANELI     Clinical Presentation (PT Evaluation Complexity) stable  -YANELI     Clinical Decision Making (PT Evaluation Complexity) low complexity  -YANELI     Overall Complexity (PT Evaluation Complexity) low complexity  -YANELI       Row Name 10/29/24 1000          Physical Therapy Goals    Transfer Goal Selection (PT) transfer, PT goal 1  -YANELI     Gait Training Goal Selection (PT) gait training, PT goal 1  -YANELI       Row Name 10/29/24 1000          Transfer Goal 1 (PT)    Activity/Assistive Device (Transfer Goal 1, PT) transfers, all  -YANELI     [place] Level/Cues Needed (Transfer Goal 1, PT) independent  -YANELI     Time Frame (Transfer Goal 1, PT) long term goal (LTG);10 days  -YANELI       Row Name 10/29/24 1000          Gait Training Goal 1 (PT)    Activity/Assistive Device (Gait Training Goal 1, PT) gait (walking locomotion);assistive device use;walker, rolling  -YANELI     [place] Level (Gait Training Goal 1, PT) independent  -YANELI     Distance (Gait Training Goal 1, PT) 300  -YANELI     Time Frame (Gait Training Goal 1, PT) long term goal (LTG);10 days  -YANELI               User Key  (r) = Recorded By, (t) = Taken By, (c) = Cosigned By      Initials Name Provider Type    Jaleesa Moore RN Registered Nurse    Sean Ross, PT Physical Therapist                    Physical Therapy  Education        No education to display                  PT Recommendation and Plan  Anticipated Discharge Disposition (PT): skilled nursing facility  Planned Therapy Interventions (PT): balance training, bed mobility training, gait training, home exercise program, stair training, strengthening, transfer training  Therapy Frequency (PT): daily  Plan of Care Reviewed With: patient  Outcome Evaluation: Patient presents with decreased strength, transfers and ambulation.  Skilled physical therapy services will be required to address these mobility deficits.   Outcome Measures       Row Name 10/29/24 1000             How much help from another person do you currently need...    Turning from your back to your side while in flat bed without using bedrails? 3  -YANELI      Moving from lying on back to sitting on the side of a flat bed without bedrails? 3  -YANELI      Moving to and from a bed to a chair (including a wheelchair)? 3  -YANELI      Standing up from a chair using your arms (e.g., wheelchair, bedside chair)? 2  -YANELI      Climbing 3-5 steps with a railing? 3  -YANELI      To walk in hospital room? 3  -YANELI      AM-PAC 6 Clicks Score (PT) 17  -YANELI         Functional Assessment    Outcome Measure Options AM-PAC 6 Clicks Basic Mobility (PT)  -YANELI                User Key  (r) = Recorded By, (t) = Taken By, (c) = Cosigned By      Initials Name Provider Type    Sean Ross PT Physical Therapist                     Time Calculation:    PT Charges       Row Name 10/29/24 1027             Time Calculation    PT Received On 10/29/24  -YANELI      PT Goal Re-Cert Due Date 11/07/24  -YANELI         Untimed Charges    PT Eval/Re-eval Minutes 30  -YANELI         Total Minutes    Untimed Charges Total Minutes 30  -YANELI       Total Minutes 30  -YANELI                User Key  (r) = Recorded By, (t) = Taken By, (c) = Cosigned By      Initials Name Provider Type    Sean Ross PT Physical Therapist                      PT G-Codes  Outcome Measure  Options: AM-PAC 6 Clicks Basic Mobility (PT)  AM-[facility] 6 Clicks Score (PT): 17    Sean Victor, PT  10/29/2024

## 2024-10-29 NOTE — THERAPY EVALUATION
Patient Name: Gilberto Sanders  : 1950    MRN: [id number]                              Today's Date: 10/29/2024       Admit Date: 10/28/2024    Visit Dx:     ICD-10-CM ICD-9-CM   1. Difficulty in walking  R26.2 719.7   2. Closed displaced fracture of right patella, unspecified fracture morphology, initial encounter  S82.001A 822.0     Patient Active Problem List   Diagnosis    S/P CABG x10 by Dr. Kruger    Coronary heart disease    Hyperlipidemia    Hypertension    Skin disorder    Status post coronary artery stent placement    Status post coronary artery bypass graft    Paroxysmal atrial fibrillation    Sick sinus syndrome    Acute respiratory failure with hypoxia    Closed displaced fracture of right patella    Right patella fracture     Past Medical History:   Diagnosis Date    -2023    Chronic kidney disease (CKD)     Coronary artery disease     Hyperlipidemia     Hypertension     Multiple rib fractures     from a fall early spring 2023, healed without intervention    Myocardial infarction     RSV (acute bronchiolitis due to respiratory syncytial virus) 2024    Skin cancer     back, chest, arms (lots of things burned off)     Past Surgical History:   Procedure Laterality Date    ABLATION OF DYSRHYTHMIC FOCUS      APPENDECTOMY      CARDIAC ELECTROPHYSIOLOGY PROCEDURE N/A 2023    Procedure: Ablation atrial fibrillation, RF, rep emailed;  Surgeon: Adrian Valentino MD;  Location: [facility] CATH INVASIVE LOCATION;  Service: Cardiovascular;  Laterality: N/A;    CORONARY ANGIOPLASTY      CORONARY ARTERY BYPASS GRAFT  08/30/2017    X10 Dr Kruger    CORONARY STENT PLACEMENT  1984    3 stent    PATELLA OPEN REDUCTION INTERNAL FIXATION Right 10/28/2024    Procedure: PATELLA OPEN REDUCTION INTERNAL FIXATION;  Surgeon: Aníbal Montoya MD;  Location: [facility] OR [facility];  Service: Orthopedics;  Laterality: Right;    RENAL ARTERY STENT      SKIN CANCER EXCISION        General Information       Row  Name 10/29/24 1111 10/29/24 1051       OT Time and Intention    Document Type therapy note (daily note) (P)   - evaluation (P)   -    Mode of Treatment individual therapy;occupational therapy (P)   - individual therapy;occupational therapy (P)   -      Row Name 10/29/24 1111 10/29/24 1051       General Information    Patient Profile Reviewed yes (P)   - yes (P)   -    Prior Level of Function -- independent:;ADL's;all household mobility  Patient independent in ADLs, stands in walk-in shower and at sink. Patient does not use device for mobility, no home O2. x1 fall in last 3 months.  -ES    Existing Precautions/Restrictions fall;weight bearing;brace on at all times (P)   -AJ fall;weight bearing;brace on at all times (P)   -    Barriers to Rehab none identified (P)   - none identified (P)   -      Row Name 10/29/24 1051          Occupational Profile    Reason for Services/Referral (Occupational Profile) Patient is 74 yr-old F admitted to [facility] on 10/28/24 with R knee pain post fall. S/P R Patella Open Reduction Internal Fixation, WBAT RLE with brace locked in extension at all times. Occupational Therapy evaluation and treatment order d/t recent decline in ADLs/transfer ability and discharge planning recommendations. No previous OT for current condition.  -       Row Name 10/29/24 1051          Living Environment    People in Home alone (P)   -       Row Name 10/29/24 1051          Home Main Entrance    Number of Stairs, Main Entrance other (see comments)  Patient has three steps on front of house and six in back of house.  -ES     Stair Railings, Main Entrance railings safe and in good condition (P)   -       Row Name 10/29/24 1051          Stairs Within Home, Primary    Stairs, Within Home, Primary --  -ES     Number of Stairs, Within Home, Primary none (P)   -       Row Name 10/29/24 1111 10/29/24 1051       Cognition    Orientation Status (Cognition) oriented x 3  -ES  oriented x 3  -ES      Row Name 10/29/24 1111 10/29/24 1051       Safety Issues/Impairments Affecting Functional Mobility    Impairments Affecting Function (Mobility) balance;pain;range of motion (ROM);strength (P)   -AJ balance;pain;range of motion (ROM);strength (P)   -AJ              User Key  (r) = Recorded By, (t) = Taken By, (c) = Cosigned By      Initials Name Provider Type    Tasha Guaman, OTR/L, CSRS Occupational Therapist    Vicky Cummings, OT Student OT Student                     Mobility/ADL's       Row Name 10/29/24 1100          Bed Mobility    All Activities, [place] (Bed Mobility) not tested  -ES     Comment, (Bed Mobility) Patient met seated in recliner chair and returned to chair post-session. (P)   -       Row Name 10/29/24 1112 10/29/24 1100       Transfers    Transfers sit-stand transfer;stand-sit transfer;toilet transfer (P)   -AJ sit-stand transfer;stand-sit transfer;toilet transfer (P)   -ASHLEY    Comment, (Transfers) Patient completed x4 transfers throughout session, requiring CGA intially transitioning to Min A with lower surfaces. Patient required Min A with sitting in recliner due to knee locked in extension. Provided verbal cueing for UE placement for safe transfer prior to transfer. (P)   -AJ --      Row Name 10/29/24 1112 10/29/24 1100       Sit-Stand Transfer    Sit-Stand [place] (Transfers) contact guard (P)   - contact guard (P)   -    Assistive Device (Sit-Stand Transfers) walker, front-wheeled (P)   -AJ walker, front-wheeled (P)   -AJ      Row Name 10/29/24 1112 10/29/24 1100       Stand-Sit Transfer    Stand-Sit [place] (Transfers) minimum assist (75% patient effort) (P)   -AJ minimum assist (75% patient effort) (P)   -AJ    Assistive Device (Stand-Sit Transfers) walker, front-wheeled (P)   -AJ walker, front-wheeled (P)   -AJ      Row Name 10/29/24 1112 10/29/24 1100       Toilet Transfer    Type (Toilet Transfer) sit-stand;stand-sit (P)   -AJ  sit-stand;stand-sit (P)   -AJ    [place] Level (Toilet Transfer) minimum assist (75% patient effort) (P)   -AJ minimum assist (75% patient effort) (P)   -AJ    Assistive Device (Toilet Transfer) raised toilet seat;walker, front-wheeled;grab bars/safety frame (P)   -AJ raised toilet seat;grab bars/safety frame;walker, front-wheeled (P)   -      Row Name 10/29/24 1112 10/29/24 1100       Functional Mobility    Functional Mobility- Ind. Level contact guard assist (P)   -AJ contact guard assist (P)   -AJ    Functional Mobility- Device walker, front-wheeled (P)   -AJ walker, front-wheeled (P)   -    Functional Mobility-Maintain WBing --  -ES -- (P)   -AJ    Functional Mobility- Safety Issues step length decreased;weight-shifting ability decreased (P)   -AJ --    Functional Mobility- Comment Patient completed functional mobility with-in room, in preparation for household mobility. Provided education and training on rolling walker management prior to mobility. (P)   - --      Row Name 10/29/24 1112 10/29/24 1100       Activities of Daily Living    BADL Assessment/Intervention bathing;upper body dressing;lower body dressing;toileting (P)   - bathing;upper body dressing;lower body dressing;grooming;toileting;feeding (P)   -      Row Name 10/29/24 1112 10/29/24 1100       Mobility    Extremity Weight-bearing Status right lower extremity;left upper extremity (P)   -AJ right lower extremity (P)   -    Right Lower Extremity (Weight-bearing Status) weight-bearing as tolerated (WBAT) (P)   -AJ weight-bearing as tolerated (WBAT) (P)   -      Row Name 10/29/24 1112 10/29/24 1100       Bathing Assessment/Intervention    [place] Level (Bathing) -- bathing skills;lower body;maximum assist (25% patient effort);upper body;set up (P)   -AJ    Comment, (Bathing) Provided education on surgical precautions with bathing and safe transfer techniques in/out shower to prevent falls. (P)   -AJ --      Row Name 10/29/24  1112 10/29/24 1100       Upper Body Dressing Assessment/Training    [place] Level (Upper Body Dressing) upper body dressing skills;doff;don;front opening garment;contact guard assist (P)   -AJ upper body dressing skills;set up (P)   -AJ    Position (Upper Body Dressing) supported standing (P)   -AJ --    Comment, (Upper Body Dressing) Patient doffed/donned hospital gown in supported standing, requiring CGA for support. (P)   - --      Row Name 10/29/24 1112 10/29/24 1100       Lower Body Dressing Assessment/Training    [place] Level (Lower Body Dressing) lower body dressing skills;doff;don;pants/bottoms;moderate assist (50% patient effort) (P)   -AJ lower body dressing skills;moderate assist (50% patient effort) (P)   -AJ    Position (Lower Body Dressing) unsupported sitting;supported standing (P)   -AJ --    Comment, (Lower Body Dressing) Patient completed doffing/donning brief, requiring Max A to thread BLEs. Patient able to don brief from knee level, requiring verbal cueing to navigate brief around brace. Provided education on LB dressing techinques and safe transfer strategies to prevent pain and falls with dressing. Patient CGA to stand. (P)   -AJ --      Row Name 10/29/24 1100          Grooming Assessment/Training    [place] Level (Grooming) grooming skills;set up (P)   -       Row Name 10/29/24 1112 10/29/24 1100       Toileting Assessment/Training    [place] Level (Toileting) toileting skills (P)   - toileting skills;maximum assist (25% patient effort) (P)   -AJ    Assistive Devices (Toileting) raised toilet seat;grab bar/safety frame (P)   -AJ --    Position (Toileting) supported standing;supported sitting (P)   -AJ --    Comment, (Toileting) Patient participated in simulated toileting task in preparation for completion at home. Patient required Min A with toilet transfer, transitioning to CGA in supported standing. Provided education on safe transfer using grab-bars, and discussed  with patient possiblity of providing raised toilet seat for home. (P)   -AJ --      Row Name 10/29/24 1100          Self-Feeding Assessment/Training    [place] Level (Feeding) feeding skills;set up (P)   -               User Key  (r) = Recorded By, (t) = Taken By, (c) = Cosigned By      Initials Name Provider Type    Tasha Guaman, OTR/L, JOSIAHS Occupational Therapist    Vicky Cummings OT Student OT Student                   Obj/Interventions       Row Name 10/29/24 1103          Range of Motion Comprehensive    General Range of Motion no range of motion deficits identified (P)   -       Row Name 10/29/24 1103          Strength Comprehensive (MMT)    General Manual Muscle Testing (MMT) Assessment upper extremity strength deficits identified (P)   -     Comment, General Manual Muscle Testing (MMT) Assessment 3+/5  -       Row Name 10/29/24 1127 10/29/24 1103       Motor Skills    Motor Skills functional endurance (P)   - functional endurance (P)   -    Functional Endurance fair (P)   - fair (P)   -      Row Name 10/29/24 1127 10/29/24 1103       Balance    Balance Assessment sitting dynamic balance;standing dynamic balance (P)   - sitting dynamic balance;standing dynamic balance (P)   -    Dynamic Sitting Balance standby assist (P)   - standby assist (P)   -    Position, Sitting Balance sitting in chair (P)   - sitting in chair (P)   -    Dynamic Standing Balance contact guard (P)   - contact guard (P)   -    Position/Device Used, Standing Balance supported;walker, front-wheeled (P)   - walker, front-wheeled (P)   -    Comment, Balance Patient demonstrated no losses of balance, however reports anxiety concerning transfers and standing independently. (P)   - --              User Key  (r) = Recorded By, (t) = Taken By, (c) = Cosigned By      Initials Name Provider Type    Tasha Guaman, OTR/L, [place] Occupational Therapist    Vicky Cummings OT  Student OT Student                   Goals/Plan       Row Name 10/29/24 1106          Bed Mobility Goal 1 (OT)    Activity/Assistive Device (Bed Mobility Goal 1, OT) bed mobility activities, all (P)   -AJ     [place] Level/Cues Needed (Bed Mobility Goal 1, OT) independent (P)   -AJ     Time Frame (Bed Mobility Goal 1, OT) long term goal (LTG);10 days (P)   -AJ       Row Name 10/29/24 1106          Transfer Goal 1 (OT)    Activity/Assistive Device (Transfer Goal 1, OT) transfers, all (P)   -AJ     [place] Level/Cues Needed (Transfer Goal 1, OT) modified independence (P)   -AJ     Time Frame (Transfer Goal 1, OT) long term goal (LTG);10 days (P)   -AJ       Row Name 10/29/24 1106          Bathing Goal 1 (OT)    Activity/Device (Bathing Goal 1, OT) bathing skills, all (P)   -AJ     [place] Level/Cues Needed (Bathing Goal 1, OT) minimum assist (75% or more patient effort) (P)   -AJ     Time Frame (Bathing Goal 1, OT) long term goal (LTG);10 days (P)   -AJ       Row Name 10/29/24 1106          Dressing Goal 1 (OT)    Activity/Device (Dressing Goal 1, OT) dressing skills, all (P)   -AJ     [place]/Cues Needed (Dressing Goal 1, OT) minimum assist (75% or more patient effort) (P)   -AJ     Time Frame (Dressing Goal 1, OT) long term goal (LTG);10 days (P)   -AJ       Row Name 10/29/24 1106          Toileting Goal 1 (OT)    Activity/Device (Toileting Goal 1, OT) toileting skills, all (P)   -AJ     [place] Level/Cues Needed (Toileting Goal 1, OT) minimum assist (75% or more patient effort) (P)   -AJ     Time Frame (Toileting Goal 1, OT) long term goal (LTG);10 days (P)   -AJ       Row Name 10/29/24 1106          Grooming Goal 1 (OT)    Activity/Device (Grooming Goal 1, OT) grooming skills, all (P)   -AJ     [place] (Grooming Goal 1, OT) independent (P)   -AJ     Time Frame (Grooming Goal 1, OT) long term goal (LTG);10 days (P)   -AJ       Row Name 10/29/24 1106          Self-Feeding Goal 1 (OT)     Activity/Device (Self-Feeding Goal 1, OT) self-feeding skills, all (P)   -     [place] Level/Cues Needed (Self-Feeding Goal 1, OT) independent (P)   -     Time Frame (Self-Feeding Goal 1, OT) 10 days (P)   -       Row Name 10/29/24 1106          Problem Specific Goal 1 (OT)    Problem Specific Goal 1 (OT) Patient will demonstrate fair+ balance to maximize independence in ADLs. (P)   -     Time Frame (Problem Specific Goal 1, OT) long term goal (LTG);10 days (P)   -       Row Name 10/29/24 1106          Therapy Assessment/Plan (OT)    Planned Therapy Interventions (OT) activity tolerance training;BADL retraining;functional balance retraining;occupation/activity based interventions;patient/caregiver education/training;ROM/therapeutic exercise;strengthening exercise;transfer/mobility retraining;adaptive equipment training (P)   -               User Key  (r) = Recorded By, (t) = Taken By, (c) = Cosigned By      Initials Name Provider Type    Vicky Cummings, OT Student OT Student                   Clinical Impression       Row Name 10/29/24 1104          Pain Assessment    Pretreatment Pain Rating 1/10 (P)   -     Posttreatment Pain Rating 5/10 (P)   -     Pain Location hip;knee (P)   -     Pain Side/Orientation right (P)   -     Pain Management Interventions nursing notified;positioning techniques utilized (P)   -       Row Name 10/29/24 1129 10/29/24 1104       Plan of Care Review    Plan of Care Reviewed With patient (P)   -AJ patient (P)   -    Progress improving (P)   - --    Outcome Evaluation Patient presents AOx3 and showed fair tolerance throughout session. Patient participated in ADL tasks targeting balance with weight-bearing precautions in preparation for ADL completion at home. Patient shows improvement in balance with transfers and dynamic standing tasks, requiring less assistance to support stance. Patient demonstrates need for improvement in ROM and balance with  seated dynamic tasks, inhibiting independence with dressing ADLs. Patient shows need for improvement with functional mobility and transfers from lower surfaces, inhibiting independence in ADLs. Patient would benefit from skilled OT intervention addressing deficits to maximize independence in ADLs. (P)   -AJ Occupational Therapy evaluation complete this date. Patient presents with deficits related to decreased balance, strength, ROM and pain. Patient would benefit from skilled OT intervention addressing deficits to maximize independence in ADLs. (P)   -      Row Name 10/29/24 1104          Therapy Assessment/Plan (OT)    Patient/Family Therapy Goal Statement (OT) Maximize independence (P)   -AJ     Rehab Potential (OT) good (P)   -AJ     Criteria for Skilled Therapeutic Interventions Met (OT) yes;meets criteria;skilled treatment is necessary (P)   -ASHLEY     Therapy Frequency (OT) 5 times/wk (P)   -       Row Name 10/29/24 1104          Therapy Plan Review/Discharge Plan (OT)    Equipment Needs Upon Discharge (OT) raised toilet seat;reacher (P)   -ASHLEY     Anticipated Discharge Disposition (OT) skilled nursing facility (P)   -       Row Name 10/29/24 1129 10/29/24 1104       Positioning and Restraints    Pre-Treatment Position sitting in chair/recliner (P)   -AJ sitting in chair/recliner (P)   -AJ    Post Treatment Position chair (P)   -AJ chair (P)   -AJ    In Chair reclined;call light within reach;encouraged to call for assist;exit alarm on;with nsg (P)   -AJ reclined;call light within reach;encouraged to call for assist;exit alarm on;with nsg (P)   -AJ              User Key  (r) = Recorded By, (t) = Taken By, (c) = Cosigned By      Initials Name Provider Type    Vicky Cummings, OT Student OT Student                   Outcome Measures       Row Name 10/29/24 1109          How much help from another is currently needed...    Putting on and taking off regular lower body clothing? 2 (P)   -ASHLEY     Bathing  (including washing, rinsing, and drying) 2 (P)   -AJ     Toileting (which includes using toilet bed pan or urinal) 2 (P)   -AJ     Putting on and taking off regular upper body clothing 3 (P)   -AJ     Taking care of personal grooming (such as brushing teeth) 3 (P)   -AJ     Eating meals 4 (P)   -ASHLEY     AM-PAC 6 Clicks Score (OT) 16 (P)   -       Row Name 10/29/24 1000 10/29/24 0926       How much help from another person do you currently need...    Turning from your back to your side while in flat bed without using bedrails? 3  -YANELI 3  -ABA    Moving from lying on back to sitting on the side of a flat bed without bedrails? 3  -YANELI 3  -ABA    Moving to and from a bed to a chair (including a wheelchair)? 3  -YANELI 3  -ABA    Standing up from a chair using your arms (e.g., wheelchair, bedside chair)? 2  -YANELI 3  -ABA    Climbing 3-5 steps with a railing? 3  -YANELI 3  -ABA    To walk in hospital room? 3  -YANELI 3  -ABA    AM-PAC 6 Clicks Score (PT) 17  -YANELI 18  -ABA    Highest Level of Mobility Goal 5 --> Static standing  -YANELI 6 --> Walk 10 steps or more  -      Row Name 10/29/24 1109 10/29/24 1000       Functional Assessment    Outcome Measure Options AM-PAC 6 Clicks Daily Activity (OT) (P)   - AM-PAC 6 Clicks Basic Mobility (PT)  -YANELI              User Key  (r) = Recorded By, (t) = Taken By, (c) = Cosigned By      Initials Name Provider Type    YANELISean Bradley, PT Physical Therapist    Marianne Flynn, RN Registered Nurse    Vicky Cummings, OT Student OT Student                    Occupational Therapy Education        No education to display                  OT Recommendation and Plan  Planned Therapy Interventions (OT): (P) activity tolerance training, BADL retraining, functional balance retraining, occupation/activity based interventions, patient/caregiver education/training, ROM/therapeutic exercise, strengthening exercise, transfer/mobility retraining, adaptive equipment training  Therapy Frequency (OT): (P) 5  times/wk  Plan of Care Review  Plan of Care Reviewed With: (P) patient  Progress: (P) improving  Outcome Evaluation: (P) Patient presents AOx3 and showed fair tolerance throughout session. Patient participated in ADL tasks targeting balance with weight-bearing precautions in preparation for ADL completion at home. Patient shows improvement in balance with transfers and dynamic standing tasks, requiring less assistance to support stance. Patient demonstrates need for improvement in ROM and balance with seated dynamic tasks, inhibiting independence with dressing ADLs. Patient shows need for improvement with functional mobility and transfers from lower surfaces, inhibiting independence in ADLs. Patient would benefit from skilled OT intervention addressing deficits to maximize independence in ADLs.     Time Calculation:   Evaluation Complexity (OT)  Review Occupational Profile/Medical/Therapy History Complexity: (P) brief/low complexity  Assessment, Occupational Performance/Identification of Deficit Complexity: (P) 3-5 performance deficits  Clinical Decision Making Complexity (OT): (P) problem focused assessment/low complexity  Overall Complexity of Evaluation (OT): (P) low complexity     Time Calculation- OT       Row Name 10/29/24 1135 10/29/24 1110          Time Calculation- OT    OT Received On 10/29/24 (P)   - 10/29/24 (P)   -     OT Goal Re-Cert Due Date 11/07/24 (P)   - 11/07/24 (P)   -        Timed Charges    30560 - OT Self Care/Mgmt Minutes 25 (P)   - --        Untimed Charges    OT Eval/Re-eval Minutes -- 20 (P)   -        Total Minutes    Timed Charges Total Minutes 25 (P)   - --     Untimed Charges Total Minutes -- 20 (P)   -      Total Minutes 25 (P)   - 20 (P)   -               User Key  (r) = Recorded By, (t) = Taken By, (c) = Cosigned By      Initials Name Provider Type    Vicky Cummings OT Student OT Student                           Vicky Toledo OT  Student  10/29/2024

## 2024-10-29 NOTE — PLAN OF CARE
Goal Outcome Evaluation:  Plan of Care Reviewed With: patient        Progress: improving  Outcome Evaluation: Patient up in recliner chair A/Ox4, Medicated for pain x1 this shift and ice applied. Ambulated 10 ft and transfers with 1 assist, walker, and gait belt. Immobilizer in place to right lower extremity. Voiding and eating well. Currently sitting up in recliner chair and call light in reach.

## 2024-10-29 NOTE — PLAN OF CARE
Goal Outcome Evaluation:  Plan of Care Reviewed With: patient        Progress: improving  Outcome Evaluation: Pt is oriented x4 and during initial assessment, rated pain at 3/10. Later in shift pt complained of pain rating it 8/10. Pt was administered 2mg IV Morphine, and has had not complaint since. Will continue to monitor for pain.

## 2024-10-29 NOTE — PLAN OF CARE
Goal Outcome Evaluation:  Plan of Care Reviewed With: patient           Outcome Evaluation: Patient presents with decreased strength, transfers and ambulation.  Skilled physical therapy services will be required to address these mobility deficits.    Anticipated Discharge Disposition (PT): skilled nursing facility

## 2024-10-29 NOTE — PROGRESS NOTES
[facility]   Hospitalist Progress Note  Date: 10/29/2024  Patient Name: Gilberto Sanders  : 1950  MRN: [id number]  Date of admission: 10/28/2024      Subjective   Subjective     Chief Complaint: Medical management    Summary: Gilberto Sanders is a 74 y.o. male past medical history significant for coronary artery disease status post coronary artery bypass grafting, atrial fibrillation status post ablation on aspirin for anticoagulation, CKD stage IIIb, hypertension, hyperlipidemia, gout who recently fractured his right patella patient subsequently underwent open reduction internal fixation of right patella fracture hospitalist have been consulted postoperatively for medical management.     Interval Followup: Patient seen and examined doing well postoperative labs stable renal function stable blood pressure stable continue with hydralazine continuing to hold ARB patient having difficulty ambulating will likely require rehab will hold discharge consult       Objective   Objective     Vitals:   Temp:  [97.3 °F (36.3 °C)-98.4 °F (36.9 °C)] 98.1 °F (36.7 °C)  Heart Rate:  [56-82] 61  Resp:  [14-16] 16  BP: (114-150)/(69-97) 123/69  Flow (L/min) (Oxygen Therapy):  [3] 3    Physical Exam    Constitutional: Awake, alert, no acute distress   Eyes: Pupils equal, sclerae anicteric, no conjunctival injection   HENT: NCAT, mucous membranes moist   Neck: Supple, no thyromegaly, no lymphadenopathy, trachea midline   Respiratory: Clear to auscultation bilaterally, nonlabored respirations    Cardiovascular: RRR, no murmurs, rubs, or gallops, palpable pedal pulses bilaterally   Gastrointestinal: Positive bowel sounds, soft, nontender, nondistended   Musculoskeletal: No bilateral ankle edema, no clubbing or cyanosis to extremities   Psychiatric: Appropriate affect, cooperative   Neurologic: Oriented x 3, strength symmetric in all extremities, Cranial Nerves grossly intact to confrontation, speech  clear   Skin: No rashes     Result Review    Result Review:  I have personally reviewed the results from the time of this admission to 10/29/2024 12:04 EDT and agree with these findings:  []  Laboratory  []  Microbiology  []  Radiology  []  EKG/Telemetry   []  Cardiology/Vascular   []  Pathology  []  Old records  []  Other:    Assessment & Plan   Assessment / Plan   Assessment:     Coronary artery disease status post bypass surgery in the past and coronary artery stent placement  History of atrial fibrillation status post cardiac ablation maintaining sinus rhythm on aspirin  History of second-degree AV block Mobitz type I  Hypertension  Hyperlipidemia  Gout  CKD stage IIIb     Plan:     Continue with Coreg continue hydralazine  Hold losartan can resume as blood pressures require  Hold aspirin while patient is on Eliquis for DVT prophylaxis can resume after  Continue allopurinol for history of gout  History of CKD stage IIIb will hold nephrotoxics and NSAIDs  DC IV fluid.  Chemistries in the a.m. along with CBC to assess renal function electrolytes and hemoglobin  PT OT consultations  Further treatment contingent upon his hospital course       Discussed plan with RN.    VTE Prophylaxis:  Pharmacologic VTE prophylaxis orders are present.        CODE STATUS:   Level Of Support Discussed With: Patient  Code Status (Patient has no pulse and is not breathing): CPR (Attempt to Resuscitate)  Medical Interventions (Patient has pulse or is breathing): Full Support        Electronically signed by JARED Worthington, 10/29/24, 12:04 PM EDT.

## 2024-10-29 NOTE — PROGRESS NOTES
" [facility]     Progress Note    Patient Name: Gilberto Sanders  : 1950  MRN: [id number]  Primary Care Physician:  Amarilys Bear MD  Date of admission: 10/28/2024    Subjective   Subjective     HPI:  Patient Reports no new complaints this morning.  His pain is controlled.  He denies any chest pain or shortness of air.    Review of Systems   See HPI    Objective   Objective     Vitals:   Temp:  [97.3 °F (36.3 °C)-98.4 °F (36.9 °C)] 97.7 °F (36.5 °C)  Heart Rate:  [56-82] 82  Resp:  [14-19] 16  BP: (114-150)/(69-97) 120/69  Flow (L/min) (Oxygen Therapy):  [3] 3  Physical Exam    General: Alert, no acute distress   Chest: Unlabored breathing, cardiovascular: Regular rate    Musculoskeletal: Dressing intact.  Immobilizer intact.  Neurovascular intact extremity.  Positive pulses.    Result Review      No results found for: \"WBC\", \"RBC\", \"HGB\", \"HCT\", \"MCV\", \"MCH\", \"MCHC\", \"RDW\", \"RDWSD\", \"MPV\", \"PLT\", \"NEUTRORELPCT\", \"LYMPHORELPCT\", \"MONORELPCT\", \"EOSRELPCT\", \"BASORELPCT\", \"AUTOIGPER\", \"NEUTROABS\", \"LYMPHSABS\", \"MONOSABS\", \"EOSABS\", \"BASOSABS\", \"AUTOIGNUM\", \"NRBC\"     Result Review:  I have personally reviewed the results from the time of this admission to 10/29/2024 07:42 EDT and agree with these findings:  [x]  Laboratory  []  Microbiology  [x]  Radiology  []  EKG/Telemetry   []  Cardiology/Vascular   []  Pathology  []  Old records  []  Other:      Assessment & Plan   Assessment / Plan     Brief Patient Summary:  Gilberto Sanders is a 74 y.o. male who is status post ORIF right patella fracture    Active Hospital Problems:  Active Hospital Problems    Diagnosis     **Closed displaced fracture of right patella     Right patella fracture      Plan: Weightbearing as tolerated with brace locked in extension  PT may unlock brace to do range of motion 0 to 30 degrees  PT/OT  Pain control  DVT prophylaxis  Discharge planning: Home versus inpatient rehab when able.  Probably not until tomorrow       VTE " Prophylaxis:  Pharmacologic VTE prophylaxis orders are present.        CODE STATUS:   Level Of Support Discussed With: Patient  Code Status (Patient has no pulse and is not breathing): CPR (Attempt to Resuscitate)  Medical Interventions (Patient has pulse or is breathing): Full Support    Disposition:  I expect patient to be discharged when medically able.    Electronically signed by Aníbal Montoya MD, 10/29/24, 7:42 AM EDT.

## 2024-10-30 PROCEDURE — 97116 GAIT TRAINING THERAPY: CPT

## 2024-10-30 PROCEDURE — 99213 OFFICE O/P EST LOW 20 MIN: CPT | Performed by: PHYSICIAN ASSISTANT

## 2024-10-30 RX ADMIN — CARVEDILOL 3.12 MG: 3.12 TABLET, FILM COATED ORAL at 09:24

## 2024-10-30 RX ADMIN — ATORVASTATIN CALCIUM 40 MG: 40 TABLET, FILM COATED ORAL at 20:29

## 2024-10-30 RX ADMIN — FAMOTIDINE 20 MG: 20 TABLET ORAL at 20:29

## 2024-10-30 RX ADMIN — ALLOPURINOL 50 MG: 100 TABLET ORAL at 20:29

## 2024-10-30 RX ADMIN — HYDRALAZINE HYDROCHLORIDE 50 MG: 25 TABLET ORAL at 09:24

## 2024-10-30 RX ADMIN — HYDRALAZINE HYDROCHLORIDE 50 MG: 25 TABLET ORAL at 20:28

## 2024-10-30 RX ADMIN — FAMOTIDINE 20 MG: 20 TABLET ORAL at 09:24

## 2024-10-30 RX ADMIN — FERROUS SULFATE TAB 325 MG (65 MG ELEMENTAL FE) 325 MG: 325 (65 FE) TAB at 09:24

## 2024-10-30 RX ADMIN — APIXABAN 2.5 MG: 2.5 TABLET, FILM COATED ORAL at 09:24

## 2024-10-30 RX ADMIN — CARVEDILOL 3.12 MG: 3.12 TABLET, FILM COATED ORAL at 20:29

## 2024-10-30 RX ADMIN — APIXABAN 2.5 MG: 2.5 TABLET, FILM COATED ORAL at 20:29

## 2024-10-30 RX ADMIN — HYDROCODONE BITARTRATE AND ACETAMINOPHEN 2 TABLET: 7.5; 325 TABLET ORAL at 20:28

## 2024-10-30 RX ADMIN — HYDROCODONE BITARTRATE AND ACETAMINOPHEN 1 TABLET: 7.5; 325 TABLET ORAL at 09:34

## 2024-10-30 NOTE — PROGRESS NOTES
" [facility]     Progress Note    Patient Name: Gilberto Sanders  : 1950  MRN: [id number]  Primary Care Physician:  Amarilys Bear MD  Date of admission: 10/28/2024    Subjective   Subjective     HPI:  Patient Reports no new complaints this morning.  His pain is controlled.  He is awaiting discharge to inpatient rehab.    Review of Systems   See HPI    Objective   Objective     Vitals:   Temp:  [97.7 °F (36.5 °C)-98.2 °F (36.8 °C)] 97.9 °F (36.6 °C)  Heart Rate:  [59-81] 59  Resp:  [16] 16  BP: (106-123)/(69-86) 106/71  Physical Exam    General: Alert, no acute distress   Chest: Unlabored breathing, cardiovascular: Regular heart rate   Musculoskeletal: Neurovascular intact extremity.  Dressing intact.  Full extension.  Brace intact.    Result Review      No results found for: \"WBC\", \"RBC\", \"HGB\", \"HCT\", \"MCV\", \"MCH\", \"MCHC\", \"RDW\", \"RDWSD\", \"MPV\", \"PLT\", \"NEUTRORELPCT\", \"LYMPHORELPCT\", \"MONORELPCT\", \"EOSRELPCT\", \"BASORELPCT\", \"AUTOIGPER\", \"NEUTROABS\", \"LYMPHSABS\", \"MONOSABS\", \"EOSABS\", \"BASOSABS\", \"AUTOIGNUM\", \"NRBC\"     Result Review:  I have personally reviewed the results from the time of this admission to 10/30/2024 07:06 EDT and agree with these findings:  []  Laboratory  []  Microbiology  []  Radiology  []  EKG/Telemetry   []  Cardiology/Vascular   []  Pathology  []  Old records  []  Other:      Assessment & Plan   Assessment / Plan     Brief Patient Summary:  Gilberto Sanders is a 74 y.o. male who is status post right patella fracture ORIF    Active Hospital Problems:  Active Hospital Problems    Diagnosis     **Closed displaced fracture of right patella     Right patella fracture      Plan: Weightbearing as tolerated with brace locked in extension  PT/OT, may unlock brace with therapy 0 to 30 degrees range of motion  DVT prophylaxis  Pain control  Discharge planning: Inpatient rehab when able if approved       VTE Prophylaxis:  Pharmacologic VTE prophylaxis orders are present.        CODE STATUS: "   Level Of Support Discussed With: Patient  Code Status (Patient has no pulse and is not breathing): CPR (Attempt to Resuscitate)  Medical Interventions (Patient has pulse or is breathing): Full Support    Disposition:  I expect patient to be discharged when medically able.    Electronically signed by Aníbal Montoya MD, 10/30/24, 7:06 AM EDT.

## 2024-10-30 NOTE — PLAN OF CARE
Goal Outcome Evaluation:  Plan of Care Reviewed With: patient        Progress: no change  Outcome Evaluation: Pt. VSS, remains A & O X 4. Immobilizer remains in place to RLE. Norco given for pain control per orders. No new concerns stated. Neurovascular checks remain intact.

## 2024-10-30 NOTE — THERAPY TREATMENT NOTE
Acute Care - Physical Therapy Treatment Note   [facility]     Patient Name: Gilberto Sanders  : 1950  MRN: [id number]  Today's Date: 10/30/2024      Visit Dx:     ICD-10-CM ICD-9-CM   1. Difficulty in walking  R26.2 719.7   2. Closed displaced fracture of right patella, unspecified fracture morphology, initial encounter  S82.001A 822.0     Patient Active Problem List   Diagnosis    S/P CABG x10 by Dr. Kruger    Coronary heart disease    Hyperlipidemia    Hypertension    Skin disorder    Status post coronary artery stent placement    Status post coronary artery bypass graft    Paroxysmal atrial fibrillation    Sick sinus syndrome    Acute respiratory failure with hypoxia    Closed displaced fracture of right patella    Right patella fracture     Past Medical History:   Diagnosis Date    -2023    Chronic kidney disease (CKD)     Coronary artery disease     Hyperlipidemia     Hypertension     Multiple rib fractures     from a fall early spring 2023, healed without intervention    Myocardial infarction     RSV (acute bronchiolitis due to respiratory syncytial virus) 2024    Skin cancer     back, chest, arms (lots of things burned off)     Past Surgical History:   Procedure Laterality Date    ABLATION OF DYSRHYTHMIC FOCUS      APPENDECTOMY      CARDIAC ELECTROPHYSIOLOGY PROCEDURE N/A 2023    Procedure: Ablation atrial fibrillation, RF, rep emailed;  Surgeon: Adrian Valentino MD;  Location: [facility] INVASIVE LOCATION;  Service: Cardiovascular;  Laterality: N/A;    CORONARY ANGIOPLASTY      CORONARY ARTERY BYPASS GRAFT  08/30/2017    X10 Dr Kruger    CORONARY STENT PLACEMENT  1984    3 stent    PATELLA OPEN REDUCTION INTERNAL FIXATION Right 10/28/2024    Procedure: PATELLA OPEN REDUCTION INTERNAL FIXATION;  Surgeon: Aníbal Montoya MD;  Location: [facility];  Service: Orthopedics;  Laterality: Right;    RENAL ARTERY STENT      SKIN CANCER EXCISION       PT Assessment (Last 12 Hours)        PT Evaluation and Treatment       Row Name 10/30/24 1500          Physical Therapy Time and Intention    Subjective Information no complaints (P)   -EW     Document Type therapy note (daily note) (P)   -EW     Mode of Treatment individual therapy;physical therapy (P)   -EW     Total Minutes, Physical Therapy 15 (P)   -EW     Patient Effort good (P)   -EW     Symptoms Noted During/After Treatment none (P)   -EW       Row Name 10/30/24 1500          General Information    Patient Profile Reviewed yes (P)   -EW     Patient Observations alert;cooperative;agree to therapy (P)   -EW     Benefits Reviewed patient:;improve function;increase independence;increase strength;increase balance;decrease pain (P)   -EW     Barriers to Rehab none identified (P)   -EW       Row Name 10/30/24 1500          Mobility    Extremity Weight-bearing Status right lower extremity (P)   -EW     Right Lower Extremity (Weight-bearing Status) weight-bearing as tolerated (WBAT) (P)   -EW       Row Name 10/30/24 1500          Bed Mobility    Bed Mobility supine-sit;sit-supine (P)   -EW     Supine-Sit Storey (Bed Mobility) minimum assist (75% patient effort);1 person assist (P)   -EW     Sit-Supine Storey (Bed Mobility) minimum assist (75% patient effort);1 person assist (P)   -EW       Row Name 10/30/24 1500          Transfers    Transfers sit-stand transfer;stand-sit transfer (P)   -EW       Row Name 10/30/24 1500          Sit-Stand Transfer    Sit-Stand Storey (Transfers) contact guard (P)   -EW     Assistive Device (Sit-Stand Transfers) walker, front-wheeled (P)   -EW       Row Name 10/30/24 1500          Stand-Sit Transfer    Stand-Sit Storey (Transfers) contact guard;1 person assist (P)   -EW       Row Name 10/30/24 1500          Gait/Stairs (Locomotion)    Gait/Stairs Locomotion gait/ambulation assistive device (P)   -EW     Storey Level (Gait) contact guard (P)   -EW     Assistive Device (Gait) walker,  front-wheeled (P)   -EW     Patient was able to Ambulate yes (P)   -EW     Distance in Feet (Gait) 75 (P)   -EW     Pattern (Gait) step-to (P)   -EW     Bilateral Gait Deviations forward flexed posture (P)   -EW       Row Name 10/30/24 1500          Safety Issues/Impairments Affecting Functional Mobility    Impairments Affecting Function (Mobility) balance;pain;range of motion (ROM);strength (P)   -EW       Row Name 10/30/24 1500          Balance    Balance Assessment sitting static balance;standing dynamic balance (P)   -EW     Static Sitting Balance independent (P)   -EW     Position, Sitting Balance unsupported;sitting edge of bed (P)   -EW     Dynamic Standing Balance contact guard;1-person assist (P)   -EW     Position/Device Used, Standing Balance supported;walker, front-wheeled (P)   -EW       Row Name             Wound 10/28/24 Right anterior knee    Wound - Properties Group Placement Date: 10/28/24  -JM Side: Right  -JM Orientation: anterior  -JM Location: knee  -JM Primary Wound Type: Incision  -JM    Retired Wound - Properties Group Placement Date: 10/28/24  -JM Side: Right  -JM Orientation: anterior  -JM Location: knee  -JM Primary Wound Type: Incision  -JM    Retired Wound - Properties Group Placement Date: 10/28/24  -JM Side: Right  -JM Orientation: anterior  -JM Location: knee  -JM Primary Wound Type: Incision  -JM    Retired Wound - Properties Group Date first assessed: 10/28/24  -JM Side: Right  -JM Location: knee  -JM Primary Wound Type: Incision  -JM      Row Name 10/30/24 1500          Progress Summary (PT)    Progress Toward Functional Goals (PT) progress toward functional goals is good (P)   -EW     Daily Progress Summary (PT) Pt tolerated treatment well with no increased pain or difficulty. He was able to maintain EBing status without difficulty. Pt will continue to benefit from skilled PT intervention. (P)   -EW               User Key  (r) = Recorded By, (t) = Taken By, (c) = Cosigned By       Initials Name Provider Type    Jaleesa Moore RN Registered Nurse    Ovidio Khan, PT Student PT Student                    Physical Therapy Education        No education to display                  PT Recommendation and Plan     Progress Summary (PT)  Progress Toward Functional Goals (PT): (P) progress toward functional goals is good  Daily Progress Summary (PT): (P) Pt tolerated treatment well with no increased pain or difficulty. He was able to maintain EBing status without difficulty. Pt will continue to benefit from skilled PT intervention.   Outcome Measures       Row Name 10/30/24 1500 10/29/24 1000          How much help from another person do you currently need...    Turning from your back to your side while in flat bed without using bedrails? 3 (P)   -EW 3  -YANELI     Moving from lying on back to sitting on the side of a flat bed without bedrails? 3 (P)   -EW 3  -YANELI     Moving to and from a bed to a chair (including a wheelchair)? 3 (P)   -EW 3  -YANELI     Standing up from a chair using your arms (e.g., wheelchair, bedside chair)? 3 (P)   -EW 2  -YANELI     Climbing 3-5 steps with a railing? 3 (P)   -EW 3  -YANELI     To walk in hospital room? 3 (P)   -EW 3  -YANELI     AM-PAC 6 Clicks Score (PT) 18 (P)   -EW 17  -YANELI        How much help from another is currently needed...    Putting on and taking off regular lower body clothing? 3 (P)   -EW --        Functional Assessment    Outcome Measure Options AM-PAC 6 Clicks Basic Mobility (PT) (P)   -EW AM-PAC 6 Clicks Basic Mobility (PT)  -YANELI               User Key  (r) = Recorded By, (t) = Taken By, (c) = Cosigned By      Initials Name Provider Type    YANELI Sean Victor PT Physical Therapist    Ovidio Khan, PT Student PT Student                     Time Calculation:    PT Charges       Row Name 10/30/24 0115             Time Calculation    PT Received On 10/30/24  -YANELI         Timed Charges    93360 - Gait Training Minutes  15  -YANELI         Total Minutes    Timed  Charges Total Minutes 15  -YANELI       Total Minutes 15  -YANELI                User Key  (r) = Recorded By, (t) = Taken By, (c) = Cosigned By      Initials Name Provider Type    Sean Ross, PT Physical Therapist                      PT G-Codes  Outcome Measure Options: (P) AM-PAC 6 Clicks Basic Mobility (PT)  AM-PAC 6 Clicks Score (PT): (P) 18  AM-PAC 6 Clicks Score (OT): 16    Ovidio Lee PT Student  10/30/2024

## 2024-10-30 NOTE — PROGRESS NOTES
[facility]   Hospitalist Progress Note  Date: 10/30/2024  Patient Name: Gilberto Sanders  : 1950  MRN: [id number]  Date of admission: 10/28/2024      Subjective   Subjective     Chief Complaint: Medical management    Summary: Gilberto Sanders is a 74 y.o. male past medical history significant for coronary artery disease status post coronary artery bypass grafting, atrial fibrillation status post ablation on aspirin for anticoagulation, CKD stage IIIb, hypertension, hyperlipidemia, gout who recently fractured his right patella patient subsequently underwent open reduction internal fixation of right patella fracture hospitalist have been consulted postoperatively for medical management.     Interval Followup: 10/30/2024    R knee pain reasonably controlled  BP ... 106/71  -  133/86  Morning Cr stable at 1.7  No nausea.   No anginal type CP or SOA.  No dizziness.  Wearing knee immobilizer      Objective   Objective     Vitals:   Temp:  [97.7 °F (36.5 °C)-98.2 °F (36.8 °C)] 98.2 °F (36.8 °C)  Heart Rate:  [59-81] 61  Resp:  [16] 16  BP: (106-133)/(69-87) 133/86    Physical Exam    Constitutional: Awake, alert, NAD   Neck: Supple, no thyromegaly   Respiratory: Clear to auscultation bilaterally    Cardiovascular: RRR, no murmurs   Gastrointestinal: Soft, nontender, nondistended   Musculoskeletal: R knee immobilizer in place   Psychiatric: Appropriate affect, cooperative   Neurologic: Oriented x 3, speech clear   Skin: No rashes     Result Review    Result Review:  I have personally reviewed the results from the time of this admission to 10/30/2024 14:20 EDT and agree with these findings:  []  Laboratory  []  Microbiology  []  Radiology  []  EKG/Telemetry   []  Cardiology/Vascular   []  Pathology  []  Old records  []  Other:    Assessment & Plan   Assessment / Plan   Assessment:     Medical management  HTN  CAD/CABG/Stents   History of atrial fibrillation status post cardiac ablation maintaining sinus rhythm on  aspirin  History of second-degree AV block Mobitz type I  Hypertension  Hyperlipidemia  Gout  CKD3b     Plan:     Continue to monitor BP ... 106/71  -  133/86  Continue with Coreg continue hydralazine  Holding losartan   Hold aspirin while patient is on Eliquis for DVT prophylaxis can resume after  Continue allopurinol   History of CKD stage IIIb .... Avoid nephrotoxics / NSAIDs  DC IV fluid.  WBAT but must have immobilizer on full extension.   (0-30 deg during ROM w therapist)   PT OT consultations ....... [facility] soon  Further treatment contingent upon his hospital course       Discussed plan with RN.    VTE Prophylaxis:  Pharmacologic VTE prophylaxis orders are present.    CODE STATUS:   Level Of Support Discussed With: Patient  Code Status (Patient has no pulse and is not breathing): CPR (Attempt to Resuscitate)  Medical Interventions (Patient has pulse or is breathing): Full Support

## 2024-10-30 NOTE — PLAN OF CARE
Goal Outcome Evaluation:  Plan of Care Reviewed With: patient           Outcome Evaluation: Pt is A&O X 4, on room air, and X 1 assist. All scheduled medications given as ordered and pain managed with PRN medications. Safety checks maintained throughout shift with pt resting in bed, bed in lowest position with wheels locked, and personal items and call light within reach. VSS. Pt voiding without difficulty.

## 2024-10-31 LAB
ANION GAP SERPL CALCULATED.3IONS-SCNC: 10.1 MMOL/L (ref 5–15)
BUN SERPL-MCNC: 38 MG/DL (ref 8–23)
BUN/CREAT SERPL: 24.2 (ref 7–25)
CALCIUM SPEC-SCNC: 9.8 MG/DL (ref 8.6–10.5)
CHLORIDE SERPL-SCNC: 104 MMOL/L (ref 98–107)
CO2 SERPL-SCNC: 23.9 MMOL/L (ref 22–29)
CREAT SERPL-MCNC: 1.57 MG/DL (ref 0.76–1.27)
EGFRCR SERPLBLD CKD-EPI 2021: 46 ML/MIN/1.73
GLUCOSE SERPL-MCNC: 90 MG/DL (ref 65–99)
POTASSIUM SERPL-SCNC: 3.9 MMOL/L (ref 3.5–5.2)
SODIUM SERPL-SCNC: 138 MMOL/L (ref 136–145)
WHOLE BLOOD HOLD SPECIMEN: NORMAL

## 2024-10-31 PROCEDURE — 94799 UNLISTED PULMONARY SVC/PX: CPT

## 2024-10-31 PROCEDURE — 97530 THERAPEUTIC ACTIVITIES: CPT

## 2024-10-31 PROCEDURE — 97110 THERAPEUTIC EXERCISES: CPT

## 2024-10-31 PROCEDURE — 97116 GAIT TRAINING THERAPY: CPT

## 2024-10-31 PROCEDURE — 80048 BASIC METABOLIC PNL TOTAL CA: CPT | Performed by: PHYSICIAN ASSISTANT

## 2024-10-31 PROCEDURE — 99213 OFFICE O/P EST LOW 20 MIN: CPT | Performed by: PHYSICIAN ASSISTANT

## 2024-10-31 PROCEDURE — 97535 SELF CARE MNGMENT TRAINING: CPT

## 2024-10-31 RX ADMIN — FAMOTIDINE 20 MG: 20 TABLET ORAL at 21:15

## 2024-10-31 RX ADMIN — HYDRALAZINE HYDROCHLORIDE 50 MG: 25 TABLET ORAL at 21:15

## 2024-10-31 RX ADMIN — APIXABAN 2.5 MG: 2.5 TABLET, FILM COATED ORAL at 10:09

## 2024-10-31 RX ADMIN — CARVEDILOL 3.12 MG: 3.12 TABLET, FILM COATED ORAL at 21:15

## 2024-10-31 RX ADMIN — FERROUS SULFATE TAB 325 MG (65 MG ELEMENTAL FE) 325 MG: 325 (65 FE) TAB at 10:09

## 2024-10-31 RX ADMIN — ALLOPURINOL 50 MG: 100 TABLET ORAL at 21:15

## 2024-10-31 RX ADMIN — ATORVASTATIN CALCIUM 40 MG: 40 TABLET, FILM COATED ORAL at 21:15

## 2024-10-31 RX ADMIN — FAMOTIDINE 20 MG: 20 TABLET ORAL at 10:10

## 2024-10-31 RX ADMIN — APIXABAN 2.5 MG: 2.5 TABLET, FILM COATED ORAL at 21:15

## 2024-10-31 RX ADMIN — HYDRALAZINE HYDROCHLORIDE 50 MG: 25 TABLET ORAL at 10:09

## 2024-10-31 RX ADMIN — CARVEDILOL 3.12 MG: 3.12 TABLET, FILM COATED ORAL at 10:10

## 2024-10-31 NOTE — PROGRESS NOTES
" [facility]     Progress Note    Patient Name: Gilberto Sanders  : 1950  MRN: [id number]  Primary Care Physician:  Amarilys Bear MD  Date of admission: 10/28/2024    Subjective   Subjective     HPI:  Patient Reports doing pretty well this morning.  No new complaints.  His pain is controlled.  He denies any chest pain or shortness of air.    Review of Systems   See HPI    Objective   Objective     Vitals:   Temp:  [97.7 °F (36.5 °C)-98.2 °F (36.8 °C)] 97.7 °F (36.5 °C)  Heart Rate:  [61-80] 66  Resp:  [16] 16  BP: (123-139)/(70-88) 123/88  Physical Exam    General: Alert, no acute distress   Chest: Unlabored breathing, cardiovascular: Regular heart rate   Musculoskeletal: Neurovascular intact extremity.  Positive pulses.  Dressing intact.    Result Review      No results found for: \"WBC\", \"RBC\", \"HGB\", \"HCT\", \"MCV\", \"MCH\", \"MCHC\", \"RDW\", \"RDWSD\", \"MPV\", \"PLT\", \"NEUTRORELPCT\", \"LYMPHORELPCT\", \"MONORELPCT\", \"EOSRELPCT\", \"BASORELPCT\", \"AUTOIGPER\", \"NEUTROABS\", \"LYMPHSABS\", \"MONOSABS\", \"EOSABS\", \"BASOSABS\", \"AUTOIGNUM\", \"NRBC\"     Result Review:  I have personally reviewed the results from the time of this admission to 10/31/2024 07:40 EDT and agree with these findings:  [x]  Laboratory  []  Microbiology  []  Radiology  []  EKG/Telemetry   []  Cardiology/Vascular   []  Pathology  []  Old records  []  Other:      Assessment & Plan   Assessment / Plan     Brief Patient Summary:  Gilberto Sanders is a 74 y.o. male who is status post ORIF right patella fracture    Active Hospital Problems:  Active Hospital Problems    Diagnosis     **Closed displaced fracture of right patella     Right patella fracture      Plan: Weightbearing as tolerated with brace in extension  May unlock brace to do range of motion 0 to 30 degrees  PT/OT   pain control  DVT prophylaxis  Discharge planning: Inpatient rehab when able       VTE Prophylaxis:  Pharmacologic VTE prophylaxis orders are present.        CODE STATUS:   Level Of " Support Discussed With: Patient  Code Status (Patient has no pulse and is not breathing): CPR (Attempt to Resuscitate)  Medical Interventions (Patient has pulse or is breathing): Full Support    Disposition:  I expect patient to be discharged to inpatient rehab when able.    Electronically signed by Aníbal Montoya MD, 10/31/24, 7:40 AM EDT.

## 2024-10-31 NOTE — PROGRESS NOTES
[facility]   Hospitalist Progress Note  Date: 10/31/2024  Patient Name: Gilberto Sanders  : 1950  MRN: [id number]  Date of admission: 10/28/2024      Subjective   Subjective     Chief Complaint: Medical management    Summary: Gilberto Sanders is a 74 y.o. male past medical history significant for coronary artery disease status post coronary artery bypass grafting, atrial fibrillation status post ablation on aspirin for anticoagulation, CKD stage IIIb, hypertension, hyperlipidemia, gout who recently fractured his right patella patient subsequently underwent open reduction internal fixation of right patella fracture hospitalist have been consulted postoperatively for medical management.     Interval Followup: 10/31/2024    No new complaints or overnight issues. Has brace on.  R knee pain reasonably controlled  VSS  Cr stable   No nausea. No anginal type CP or SOA.  No dizziness.  SUNRISE MANOR soon      Objective   Objective     Vitals:   Temp:  [97.7 °F (36.5 °C)-98.6 °F (37 °C)] 98.6 °F (37 °C)  Heart Rate:  [66-71] 68  Resp:  [16] 16  BP: (123-139)/(70-90) 128/90    Physical Exam    Constitutional: Awake, alert, NAD   Neck: Supple, no thyromegaly   Respiratory: Clear to auscultation bilaterally    Cardiovascular: RRR, no murmurs   Gastrointestinal: Soft, nontender, nondistended   Musculoskeletal: R knee immobilizer in place   Psychiatric: Appropriate affect, cooperative   Neurologic: Oriented x 3, speech clear   Skin: No rashes     Result Review    Result Review:  I have personally reviewed the results from the time of this admission to 10/31/2024 13:03 EDT and agree with these findings:  []  Laboratory  []  Microbiology  []  Radiology  []  EKG/Telemetry   []  Cardiology/Vascular   []  Pathology  []  Old records  []  Other:    Assessment & Plan   Assessment / Plan   Assessment:     Medical management  HTN  CAD/CABG/Stents   History of atrial fibrillation status post cardiac ablation maintaining sinus  rhythm on aspirin  History of second-degree AV block Mobitz type I  Hypertension  Hyperlipidemia  Gout  CKD3b     Plan:     Continue to monitor BP ... 106/71  -  133/86  Continue with Coreg continue hydralazine  Holding losartan   Hold aspirin while patient is on Eliquis for DVT prophylaxis can resume after  Continue allopurinol   History of CKD stage IIIb .... Avoid nephrotoxics / NSAIDs  DC IV fluid.  WBAT but must have immobilizer on full extension.   (0-30 deg during ROM w therapist)   PT OT consultations ....... SUNRISE MANOR soon  Further treatment contingent upon his hospital course       Discussed plan with RN.    VTE Prophylaxis:  Pharmacologic VTE prophylaxis orders are present.    CODE STATUS:   Level Of Support Discussed With: Patient  Code Status (Patient has no pulse and is not breathing): CPR (Attempt to Resuscitate)  Medical Interventions (Patient has pulse or is breathing): Full Support

## 2024-10-31 NOTE — PLAN OF CARE
Goal Outcome Evaluation:  Plan of Care Reviewed With: patient        Progress: no change  Outcome Evaluation: Pt. VSS, remains A & O X 4. Aquacel display'ed minor shadow drainage, left in place per orders. RLE wrapped in ACE then placed back into immobilizer. Neurovascular checks remain intact. Pain well controlled with ordered norco. No new concerns stated.

## 2024-10-31 NOTE — THERAPY TREATMENT NOTE
Patient Name: Gilberto Sanders  : 1950    MRN: [id number]                              Today's Date: 10/31/2024       Admit Date: 10/28/2024    Visit Dx:     ICD-10-CM ICD-9-CM   1. Difficulty in walking  R26.2 719.7   2. Closed displaced fracture of right patella, unspecified fracture morphology, initial encounter  S82.001A 822.0     Patient Active Problem List   Diagnosis    S/P CABG x10 by Dr. Kruger    Coronary heart disease    Hyperlipidemia    Hypertension    Skin disorder    Status post coronary artery stent placement    Status post coronary artery bypass graft    Paroxysmal atrial fibrillation    Sick sinus syndrome    Acute respiratory failure with hypoxia    Closed displaced fracture of right patella    Right patella fracture     Past Medical History:   Diagnosis Date    -2023    Chronic kidney disease (CKD)     Coronary artery disease     Hyperlipidemia     Hypertension     Multiple rib fractures     from a fall early spring 2023, healed without intervention    Myocardial infarction     RSV (acute bronchiolitis due to respiratory syncytial virus) 2024    Skin cancer     back, chest, arms (lots of things burned off)     Past Surgical History:   Procedure Laterality Date    ABLATION OF DYSRHYTHMIC FOCUS      APPENDECTOMY      CARDIAC ELECTROPHYSIOLOGY PROCEDURE N/A 2023    Procedure: Ablation atrial fibrillation, RF, rep emailed;  Surgeon: Adrian Valentino MD;  Location: [facility] CATH INVASIVE LOCATION;  Service: Cardiovascular;  Laterality: N/A;    CORONARY ANGIOPLASTY      CORONARY ARTERY BYPASS GRAFT  08/30/2017    X10 Dr Kruger    CORONARY STENT PLACEMENT  1984    3 stent    PATELLA OPEN REDUCTION INTERNAL FIXATION Right 10/28/2024    Procedure: PATELLA OPEN REDUCTION INTERNAL FIXATION;  Surgeon: Aníbal Montoya MD;  [facility] [place] [facility];  Service: Orthopedics;  Laterality: Right;    RENAL ARTERY STENT      SKIN CANCER EXCISION        General Information       Row  Name 10/31/24 1259          OT Time and Intention    Subjective Information no complaints  -ES (r) AJ (t) ES (c)     Document Type therapy note (daily note)  -ES (r) AJ (t) ES (c)     Mode of Treatment occupational therapy  -ES (r) AJ (t) ES (c)       Row Name 10/31/24 1259          General Information    Patient Profile Reviewed yes  -ES (r) AJ (t) ES (c)     Existing Precautions/Restrictions fall;weight bearing;brace on at all times  -ES (r) AJ (t) ES (c)     Barriers to Rehab none identified  -ES (r) AJ (t) ES (c)       Row Name 10/31/24 1259          Cognition    Orientation Status (Cognition) oriented x 3  -ES (r) AJ (t) ES (c)       Row Name 10/31/24 1251          Safety Issues/Impairments Affecting Functional Mobility    Impairments Affecting Function (Mobility) balance;pain;range of motion (ROM);strength  -ES (r) AJ (t) ES (c)               User Key  (r) = Recorded By, (t) = Taken By, (c) = Cosigned By      Initials Name Provider Type    Tasha Guaman, OTR/L, CSRS Occupational Therapist    Vicky Cummings, OT Student OT Student                     Mobility/ADL's       Row Name 10/31/24 1252          Bed Mobility    Bed Mobility supine-sit;sit-supine  -ES (r) AJ (t) ES (c)     Supine-Sit [place] (Bed Mobility) contact guard  -ES (r) AJ (t) ES (c)     Sit-Supine [place] (Bed Mobility) contact guard  -ES (r) AJ (t) ES (c)     Bed Mobility, Safety Issues decreased use of legs for bridging/pushing;decreased use of arms for pushing/pulling  -ES (r) AJ (t) ES (c)     Assistive Device (Bed Mobility) head of bed elevated;bed rails  -ES (r) AJ (t) ES (c)       Row Name 10/31/24 125          Transfers    Transfers sit-stand transfer;stand-sit transfer  -ES (r) AJ (t) ES (c)     Comment, (Transfers) Patient completed x5 transfers throughout session, requiring CGA and verbal cueing for UE placement for controlled ascent/descent.  -ES (r) AJ (t) ES (c)       Row Name 10/31/24 1251           Sit-Stand Transfer    Sit-Stand [place] (Transfers) contact guard  -ES (r) AJ (t) ES (c)     Assistive Device (Sit-Stand Transfers) walker, front-wheeled  -ES (r) AJ (t) ES (c)       Row Name 10/31/24 1259          Stand-Sit Transfer    Stand-Sit [place] (Transfers) contact guard  -ES (r) AJ (t) ES (c)     Assistive Device (Stand-Sit Transfers) walker, front-wheeled  -ES (r) AJ (t) ES (c)       Row Name 10/31/24 1259          Functional Mobility    Functional Mobility- Ind. Level contact guard assist  -ES (r) AJ (t) ES (c)     Functional Mobility- Device walker, front-wheeled  -ES (r) AJ (t) ES (c)     Functional Mobility- Comment Patient completed functional mobility in-room during ADL tasks, requiring CGA for support.  -ES (r) AJ (t) ES (c)       Row Name 10/31/24 1259          Activities of Daily Living    BADL Assessment/Intervention grooming  -ES (r) AJ (t) ES (c)       Row Name 10/31/24 1259          Mobility    Extremity Weight-bearing Status right lower extremity  -ES (r) AJ (t) ES (c)     Right Lower Extremity (Weight-bearing Status) weight-bearing as tolerated (WBAT)  -ES (r) AJ (t) ES (c)       Row Name 10/31/24 1259          Grooming Assessment/Training    [place] Level (Grooming) grooming skills;hair care, combing/brushing;oral care regimen;wash face, hands;standby assist  -ES (r) AJ (t) ES (c)     Position (Grooming) supported standing;sink side  -ES (r) AJ (t) ES (c)     Comment, (Grooming) Patient completed grooming tasks in supported standing at sink, requiring SBA.  -ES (r) AJ (t) ES (c)               User Key  (r) = Recorded By, (t) = Taken By, (c) = Cosigned By      Initials Name Provider Type    Tasha Guaman, OTR/L, CSRS Occupational Therapist    Vicky Cummings, OT Student OT Student                   Obj/Interventions       Row Name 10/31/24 1304          Motor Skills    Motor Skills functional endurance  -ES (r) AJ (t) ES (c)     Functional Endurance fair,  patient declined completing other ADLs or sitting in recliner-chair post session.  -ES (r) AJ (t) ES (c)       Row Name 10/31/24 1304          Balance    Balance Assessment sitting dynamic balance;standing dynamic balance  -ES (r) AJ (t) ES (c)     Dynamic Sitting Balance standby assist  -ES (r) AJ (t) ES (c)     Position, Sitting Balance unsupported;sitting edge of bed  -ES (r) AJ (t) ES (c)     Dynamic Standing Balance contact guard  -ES (r) AJ (t) ES (c)     Position/Device Used, Standing Balance supported;walker, front-wheeled  -ES (r) AJ (t) ES (c)     Comment, Balance Patient demonstrated no losses of balance throughout session.  -ES (r) AJ (t) ES (c)               User Key  (r) = Recorded By, (t) = Taken By, (c) = Cosigned By      Initials Name Provider Type    Tasha Guaman, OTR/L, CSRS Occupational Therapist    Vicky Cummings, OT Student OT Student                   Goals/Plan    No documentation.                  Clinical Impression       Row Name 10/31/24 1306          Pain Assessment    Pretreatment Pain Rating 0/10 - no pain  -ES (r) AJ (t) ES (c)     Posttreatment Pain Rating 0/10 - no pain  -ES (r) AJ (t) ES (c)       Row Name 10/31/24 1304          Plan of Care Review    Plan of Care Reviewed With patient  -ES (r) AJ (t) ES (c)     Progress improving  -ES (r) AJ (t) ES (c)     Outcome Evaluation Patient presents AOx4 and showed fair tolerance throughout session. Patient completed ADLs, with show of improvement in balance with supported dynamic standing, demonstrating no losses of balance completing tasks. Patient continues to show limited improvement with functional endurance, declining participation in session once reaching point of fatigue, inhibiting his independence with ADLs. Patient would benefit from skilled OT intervention addressing deficits to maxmize independence in ADLs.  -ES (r) AJ (t) ES (c)       Row Name 10/31/24 1305          Positioning and Restraints     Pre-Treatment Position in bed  -ES (r) AJ (t) ES (c)     Post Treatment Position bed  -ES (r) AJ (t) ES (c)     In Bed supine;call light within reach;encouraged to call for assist;exit alarm on  -ES (r) AJ (t) ES (c)               User Key  (r) = Recorded By, (t) = Taken By, (c) = Cosigned By      Initials Name Provider Type    ES Tasha Luna OTR/L, [place] Occupational Therapist    Vicky Cummings, OT Student OT Student                   Outcome Measures       Row Name 10/31/24 1310          How much help from another is currently needed...    Putting on and taking off regular lower body clothing? 3  -ES (r) AJ (t) ES (c)     Bathing (including washing, rinsing, and drying) 2  -ES (r) AJ (t) ES (c)     Toileting (which includes using toilet bed pan or urinal) 2  -ES (r) AJ (t) ES (c)     Putting on and taking off regular upper body clothing 3  -ES (r) AJ (t) ES (c)     Taking care of personal grooming (such as brushing teeth) 3  -ES (r) AJ (t) ES (c)     Eating meals 4  -ES (r) AJ (t) ES (c)     AM-PAC 6 Clicks Score (OT) 17  -ES (r) AJ (t)       Row Name 10/31/24 1310          Functional Assessment    Outcome Measure Options AM-PAC 6 Clicks Daily Activity (OT)  -ES (r) AJ (t) ES (c)               User Key  (r) = Recorded By, (t) = Taken By, (c) = Cosigned By      Initials Name Provider Type    Tasha Guaman OTR/L, CSRS Occupational Therapist    Vicky Cummings, OT Student OT Student                    Occupational Therapy Education        No education to display                  OT Recommendation and Plan  Planned Therapy Interventions (OT): activity tolerance training, BADL retraining, functional balance retraining, occupation/activity based interventions, patient/caregiver education/training, ROM/therapeutic exercise, strengthening exercise, transfer/mobility retraining, adaptive equipment training  Therapy Frequency (OT): 5 times/wk  Plan of Care Review  Plan of Care Reviewed With:  patient  Progress: improving  Outcome Evaluation: Patient presents AOx4 and showed fair tolerance throughout session. Patient completed ADLs, with show of improvement in balance with supported dynamic standing, demonstrating no losses of balance completing tasks. Patient continues to show limited improvement with functional endurance, declining participation in session once reaching point of fatigue, inhibiting his independence with ADLs. Patient would benefit from skilled OT intervention addressing deficits to maxmize independence in ADLs.     Time Calculation:   Evaluation Complexity (OT)  Review Occupational Profile/Medical/Therapy History Complexity: brief/low complexity  Assessment, Occupational Performance/Identification of Deficit Complexity: 3-5 performance deficits  Clinical Decision Making Complexity (OT): problem focused assessment/low complexity  Overall Complexity of Evaluation (OT): low complexity     Time Calculation- OT       Row Name 10/31/24 1311             Time Calculation- OT    OT Received On 10/31/24  -ES (r) AJ (t) ES (c)      OT Goal Re-Cert Due Date 11/07/24  -ES (r) AJ (t) ES (c)         Timed Charges    64480 - OT Therapeutic Activity Minutes 10  -ES (r) AJ (t) ES (c)      68358 - OT Self Care/Mgmt Minutes 13  -ES (r) AJ (t) ES (c)         Total Minutes    Timed Charges Total Minutes 23  -ES (r) AJ (t)       Total Minutes 23  -ES (r) AJ (t)                User Key  (r) = Recorded By, (t) = Taken By, (c) = Cosigned By      Initials Name Provider Type    Tasha Guaman, OTR/L, CSRS Occupational Therapist    Vicky Cummings, OT Student OT Student                  Therapy Charges for Today       Code Description Service Date Service Provider Modifiers Qty    41974200138 HC OT THERAPEUTIC ACT EA 15 MIN 10/31/2024 Vicky Toledo OT Student GO 1    51501648083 HC OT SELF CARE/MGMT/TRAIN EA 15 MIN 10/31/2024 Vicky Toledo OT Student GO 1                  Vicky Toledo, OT Student  10/31/2024

## 2024-11-01 VITALS
WEIGHT: 182.76 LBS | HEIGHT: 68 IN | HEART RATE: 79 BPM | TEMPERATURE: 98.2 F | BODY MASS INDEX: 27.7 KG/M2 | DIASTOLIC BLOOD PRESSURE: 73 MMHG | RESPIRATION RATE: 18 BRPM | SYSTOLIC BLOOD PRESSURE: 120 MMHG | OXYGEN SATURATION: 95 %

## 2024-11-01 LAB
ANION GAP SERPL CALCULATED.3IONS-SCNC: 12.7 MMOL/L (ref 5–15)
BASOPHILS # BLD AUTO: 0.1 10*3/MM3 (ref 0–0.2)
BASOPHILS NFR BLD AUTO: 0.9 % (ref 0–1.5)
BUN SERPL-MCNC: 38 MG/DL (ref 8–23)
BUN/CREAT SERPL: 25.2 (ref 7–25)
CALCIUM SPEC-SCNC: 10 MG/DL (ref 8.6–10.5)
CHLORIDE SERPL-SCNC: 105 MMOL/L (ref 98–107)
CO2 SERPL-SCNC: 19.3 MMOL/L (ref 22–29)
CREAT SERPL-MCNC: 1.51 MG/DL (ref 0.76–1.27)
DEPRECATED RDW RBC AUTO: 53.1 FL (ref 37–54)
EGFRCR SERPLBLD CKD-EPI 2021: 48.2 ML/MIN/1.73
EOSINOPHIL # BLD AUTO: 0.22 10*3/MM3 (ref 0–0.4)
EOSINOPHIL NFR BLD AUTO: 1.9 % (ref 0.3–6.2)
ERYTHROCYTE [DISTWIDTH] IN BLOOD BY AUTOMATED COUNT: 15.1 % (ref 12.3–15.4)
GLUCOSE SERPL-MCNC: 100 MG/DL (ref 65–99)
HCT VFR BLD AUTO: 37.1 % (ref 37.5–51)
HGB BLD-MCNC: 12.3 G/DL (ref 13–17.7)
IMM GRANULOCYTES # BLD AUTO: 0.07 10*3/MM3 (ref 0–0.05)
IMM GRANULOCYTES NFR BLD AUTO: 0.6 % (ref 0–0.5)
LYMPHOCYTES # BLD AUTO: 1.53 10*3/MM3 (ref 0.7–3.1)
LYMPHOCYTES NFR BLD AUTO: 13.3 % (ref 19.6–45.3)
MCH RBC QN AUTO: 32 PG (ref 26.6–33)
MCHC RBC AUTO-ENTMCNC: 33.2 G/DL (ref 31.5–35.7)
MCV RBC AUTO: 96.6 FL (ref 79–97)
MONOCYTES # BLD AUTO: 1.09 10*3/MM3 (ref 0.1–0.9)
MONOCYTES NFR BLD AUTO: 9.5 % (ref 5–12)
NEUTROPHILS NFR BLD AUTO: 73.8 % (ref 42.7–76)
NEUTROPHILS NFR BLD AUTO: 8.52 10*3/MM3 (ref 1.7–7)
NRBC BLD AUTO-RTO: 0 /100 WBC (ref 0–0.2)
PLATELET # BLD AUTO: 228 10*3/MM3 (ref 140–450)
PMV BLD AUTO: 11.1 FL (ref 6–12)
POTASSIUM SERPL-SCNC: 4.2 MMOL/L (ref 3.5–5.2)
RBC # BLD AUTO: 3.84 10*6/MM3 (ref 4.14–5.8)
SODIUM SERPL-SCNC: 137 MMOL/L (ref 136–145)
WBC NRBC COR # BLD AUTO: 11.53 10*3/MM3 (ref 3.4–10.8)

## 2024-11-01 PROCEDURE — 97110 THERAPEUTIC EXERCISES: CPT

## 2024-11-01 PROCEDURE — 85025 COMPLETE CBC W/AUTO DIFF WBC: CPT | Performed by: PHYSICIAN ASSISTANT

## 2024-11-01 PROCEDURE — 80048 BASIC METABOLIC PNL TOTAL CA: CPT | Performed by: PHYSICIAN ASSISTANT

## 2024-11-01 PROCEDURE — 97535 SELF CARE MNGMENT TRAINING: CPT

## 2024-11-01 PROCEDURE — 99213 OFFICE O/P EST LOW 20 MIN: CPT | Performed by: PHYSICIAN ASSISTANT

## 2024-11-01 PROCEDURE — 97116 GAIT TRAINING THERAPY: CPT

## 2024-11-01 PROCEDURE — 94799 UNLISTED PULMONARY SVC/PX: CPT

## 2024-11-01 RX ORDER — LACTULOSE 10 G/15ML
10 SOLUTION ORAL
Status: COMPLETED | OUTPATIENT
Start: 2024-11-01 | End: 2024-11-01

## 2024-11-01 RX ORDER — HYDROCODONE BITARTRATE AND ACETAMINOPHEN 7.5; 325 MG/1; MG/1
1 TABLET ORAL EVERY 4 HOURS PRN
Qty: 6 TABLET | Refills: 0 | Status: SHIPPED | OUTPATIENT
Start: 2024-11-01 | End: 2024-11-02

## 2024-11-01 RX ORDER — LACTULOSE 10 G/15ML
10 SOLUTION ORAL DAILY
Status: DISCONTINUED | OUTPATIENT
Start: 2024-11-01 | End: 2024-11-01

## 2024-11-01 RX ORDER — LACTULOSE 10 G/15ML
10 SOLUTION ORAL ONCE
Status: COMPLETED | OUTPATIENT
Start: 2024-11-01 | End: 2024-11-01

## 2024-11-01 RX ADMIN — FERROUS SULFATE TAB 325 MG (65 MG ELEMENTAL FE) 325 MG: 325 (65 FE) TAB at 10:17

## 2024-11-01 RX ADMIN — HYDRALAZINE HYDROCHLORIDE 50 MG: 25 TABLET ORAL at 10:16

## 2024-11-01 RX ADMIN — LACTULOSE 10 G: 10 SOLUTION ORAL at 15:29

## 2024-11-01 RX ADMIN — LACTULOSE 10 G: 10 SOLUTION ORAL at 17:07

## 2024-11-01 RX ADMIN — MAGNESIUM HYDROXIDE 10 ML: 2400 SUSPENSION ORAL at 10:51

## 2024-11-01 RX ADMIN — APIXABAN 2.5 MG: 2.5 TABLET, FILM COATED ORAL at 10:16

## 2024-11-01 RX ADMIN — FAMOTIDINE 20 MG: 20 TABLET ORAL at 10:16

## 2024-11-01 RX ADMIN — HYDROCODONE BITARTRATE AND ACETAMINOPHEN 1 TABLET: 7.5; 325 TABLET ORAL at 03:13

## 2024-11-01 RX ADMIN — CARVEDILOL 3.12 MG: 3.12 TABLET, FILM COATED ORAL at 10:17

## 2024-11-01 NOTE — THERAPY TREATMENT NOTE
Patient Name: Gilberto Sanders  : 1950    MRN: [id number]                              Today's Date: 2024       Admit Date: 10/28/2024    Visit Dx:     ICD-10-CM ICD-9-CM   1. Difficulty in walking  R26.2 719.7   2. Closed displaced fracture of right patella, unspecified fracture morphology, initial encounter  S82.001A 822.0     Patient Active Problem List   Diagnosis    S/P CABG x10 by Dr. Kruger    Coronary heart disease    Hyperlipidemia    Hypertension    Skin disorder    Status post coronary artery stent placement    Status post coronary artery bypass graft    Paroxysmal atrial fibrillation    Sick sinus syndrome    Acute respiratory failure with hypoxia    Closed displaced fracture of right patella    Right patella fracture     Past Medical History:   Diagnosis Date    -2023    Chronic kidney disease (CKD)     Coronary artery disease     Hyperlipidemia     Hypertension     Multiple rib fractures     from a fall early spring 2023, healed without intervention    Myocardial infarction     RSV (acute bronchiolitis due to respiratory syncytial virus) 2024    Skin cancer     back, chest, arms (lots of things burned off)     Past Surgical History:   Procedure Laterality Date    ABLATION OF DYSRHYTHMIC FOCUS      APPENDECTOMY      CARDIAC ELECTROPHYSIOLOGY PROCEDURE N/A 2023    Procedure: Ablation atrial fibrillation, RF, rep emailed;  Surgeon: Adrian Valentino MD;  Location: [facility] CATH INVASIVE LOCATION;  Service: Cardiovascular;  Laterality: N/A;    CORONARY ANGIOPLASTY      CORONARY ARTERY BYPASS GRAFT  08/30/2017    X10 Dr Kruger    CORONARY STENT PLACEMENT  1984    3 stent    PATELLA OPEN REDUCTION INTERNAL FIXATION Right 10/28/2024    Procedure: PATELLA OPEN REDUCTION INTERNAL FIXATION;  Surgeon: Aníbal Montoya MD;  Location: [facility] OR [facility];  Service: Orthopedics;  Laterality: Right;    RENAL ARTERY STENT      SKIN CANCER EXCISION        General Information       Row  Name 11/01/24 1140          OT Time and Intention    Subjective Information no complaints (P)   -     Document Type therapy note (daily note) (P)   -     Mode of Treatment individual therapy;occupational therapy (P)   -       Row Name 11/01/24 1140          General Information    Patient Profile Reviewed yes (P)   -ASHLEY     Existing Precautions/Restrictions fall;weight bearing;brace on at all times (P)   -       Row Name 11/01/24 1140          Cognition    Orientation Status (Cognition) oriented x 3 (P)   -       Row Name 11/01/24 1140          Safety Issues/Impairments Affecting Functional Mobility    Impairments Affecting Function (Mobility) balance;pain;range of motion (ROM);strength (P)   -               User Key  (r) = Recorded By, (t) = Taken By, (c) = Cosigned By      Initials Name Provider Type    Vicky Cummings, OT Student OT Student                     Mobility/ADL's       Row Name 11/01/24 1141          Bed Mobility    Bed Mobility supine-sit (P)   -     Supine-Sit [place] (Bed Mobility) standby assist (P)   -     Sit-Supine [place] (Bed Mobility) -- (P)   -     Bed Mobility, Safety Issues decreased use of legs for bridging/pushing (P)   -     Assistive Device (Bed Mobility) bed rails;head of bed elevated (P)   -       Row Name 11/01/24 1141          Transfers    Transfers sit-stand transfer;stand-sit transfer;toilet transfer (P)   -ASHLEY     Comment, (Transfers) Patient completed x3 transfers throughout session, requiring CGA. (P)   -       Row Name 11/01/24 1141          Sit-Stand Transfer    Sit-Stand [place] (Transfers) contact guard (P)   -     Assistive Device (Sit-Stand Transfers) walker, front-wheeled (P)   -       Row Name 11/01/24 1141          Stand-Sit Transfer    Stand-Sit [place] (Transfers) contact guard (P)   -     Assistive Device (Stand-Sit Transfers) walker, front-wheeled (P)   -       Row Name 11/01/24 1141          Toilet  Transfer    [place] (Toilet Transfer) standby assist (P)   -     Comment, (Toilet Transfer) Patient stood for toileting, using grab-bar and RW for support. (P)   -       Row Name 11/01/24 1141          Functional Mobility    Functional Mobility- Ind. Level contact guard assist (P)   -     Functional Mobility- Device walker, front-wheeled (P)   -     Functional Mobility- Comment Patient completed functional mobility in-room, requiring CGA for support. (P)   -       Row Name 11/01/24 1141          Activities of Daily Living    BADL Assessment/Intervention toileting (P)   -       Row Name 11/01/24 1141          Mobility    Extremity Weight-bearing Status right lower extremity (P)   -     Right Lower Extremity (Weight-bearing Status) weight-bearing as tolerated (WBAT) (P)   -[facility] Name 11/01/24 1141          Toileting Assessment/Training    [place] (Toileting) toileting skills;perform perineal hygiene;adjust/manage clothing;standby assist (P)   -     Assistive Devices (Toileting) grab bar/safety frame (P)   -     Position (Toileting) supported standing (P)   -     Comment, (Toileting) Patient completed toileting in supported standing, using grab-bars and RW, requiring SBA. (P)   -               User Key  (r) = Recorded By, (t) = Taken By, (c) = Cosigned By      Initials Name Provider Type    Vicky Cummings, OT Student OT Student                   Obj/Interventions       Row Name 11/01/24 1146          Shoulder (Therapeutic Exercise)    Shoulder (Therapeutic Exercise) strengthening exercise (P)   -     Shoulder Strengthening (Therapeutic Exercise) bilateral;flexion;aBduction;standing;resistance band;yellow;10 repetitions (P)   Patient completed [id number] of bilateral shoulder flexion and abduction with yellow t-band, in supported standing.  -       Row Name 11/01/24 1146          Elbow/Forearm (Therapeutic Exercise)    Elbow/Forearm (Therapeutic Exercise)  strengthening exercise (P)   -     Elbow/Forearm Strengthening (Therapeutic Exercise) bilateral;flexion;resistance band;yellow;10 repetitions (P)   Patient completed [id number] of bilateral elbow flexion with yellow t-band, in supported standing.  -       Row Name 11/01/24 1146          Motor Skills    Functional Endurance fair (P)   -     Therapeutic Exercise shoulder;elbow/forearm (P)   -       Row Name 11/01/24 1146          Balance    Balance Assessment standing dynamic balance (P)   -     Dynamic Standing Balance contact guard (P)   -     Position/Device Used, Standing Balance walker, front-wheeled (P)   -     Comment, Balance Patient demonstrated no losses of balance throughout session. (P)   -               User Key  (r) = Recorded By, (t) = Taken By, (c) = Cosigned By      Initials Name Provider Type    Vicky Cummings, OT Student OT Student                   Goals/Plan    No documentation.                  Clinical Impression       Row Name 11/01/24 1149          Pain Assessment    Pretreatment Pain Rating 0/10 - no pain (P)   -     Posttreatment Pain Rating 0/10 - no pain (P)   -       Row Name 11/01/24 1149          Plan of Care Review    Plan of Care Reviewed With patient (P)   -ASHLEY     Progress improving (P)   -     Outcome Evaluation Patient presents AOx3 and showed fair tolerance throughout session. Patient completed functional tasks targeting balance and strength, with show of improvement by completing UE exercises in supported standing without loss of balance. Patient continues to demontrate decreased strength and ROM inhibiting independence with mobility. Patient would benefit from skilled OT intervention addressing deficits to maximize independence in ADLs. (P)   -       Row Name 11/01/24 1149          Positioning and Restraints    Pre-Treatment Position in bed (P)   -     Post Treatment Position chair (P)   -     In Chair reclined;call light within  reach;encouraged to call for assist;exit alarm on (P)   -               User Key  (r) = Recorded By, (t) = Taken By, (c) = Cosigned By      Initials Name Provider Type    Vicky Cummings, OT Student OT Student                   Outcome Measures       Row Name 11/01/24 1151          How much help from another is currently needed...    Putting on and taking off regular lower body clothing? 3 (P)   -AJ     Bathing (including washing, rinsing, and drying) 3 (P)   -AJ     Toileting (which includes using toilet bed pan or urinal) 3 (P)   -AJ     Putting on and taking off regular upper body clothing 3 (P)   -AJ     Taking care of personal grooming (such as brushing teeth) 3 (P)   -     Eating meals 4 (P)   -     AM-PAC 6 Clicks Score (OT) 19 (P)   -       Row Name 11/01/24 1034 11/01/24 1000       How much help from another person do you currently need...    Turning from your back to your side while in flat bed without using bedrails? 4  -SM 4  -JW    Moving from lying on back to sitting on the side of a flat bed without bedrails? 4  -SM 4  -JW    Moving to and from a bed to a chair (including a wheelchair)? 3  -SM 3  -JW    Standing up from a chair using your arms (e.g., wheelchair, bedside chair)? 3  -SM 3  -JW    Climbing 3-5 steps with a railing? 3  -SM 3  -JW    To walk in hospital room? 3  -SM 3  -JW    AM-PAC 6 Clicks Score (PT) 20  -SM 20  -JW    Highest Level of Mobility Goal 6 --> Walk 10 steps or more  - 6 --> Walk 10 steps or more  -      Row Name 11/01/24 1151          Functional Assessment    Outcome Measure Options AM-PAC 6 Clicks Daily Activity (OT) (P)   -ASHLEY               User Key  (r) = Recorded By, (t) = Taken By, (c) = Cosigned By      Initials Name Provider Type    Riddhi Velazquez RN Registered Nurse    Re Camacho, PTA Physical Therapist Assistant    Vicky Cummings, OT Student OT Student                    Occupational Therapy Education        No  education to display                  OT Recommendation and Plan  Planned Therapy Interventions (OT): activity tolerance training, BADL retraining, functional balance retraining, occupation/activity based interventions, patient/caregiver education/training, ROM/therapeutic exercise, strengthening exercise, transfer/mobility retraining, adaptive equipment training  Therapy Frequency (OT): 5 times/wk  Plan of Care Review  Plan of Care Reviewed With: (P) patient  Progress: (P) improving  Outcome Evaluation: (P) Patient presents AOx3 and showed fair tolerance throughout session. Patient completed functional tasks targeting balance and strength, with show of improvement by completing UE exercises in supported standing without loss of balance. Patient continues to demontrate decreased strength and ROM inhibiting independence with mobility. Patient would benefit from skilled OT intervention addressing deficits to maximize independence in ADLs.     Time Calculation:   Evaluation Complexity (OT)  Review Occupational Profile/Medical/Therapy History Complexity: brief/low complexity  Assessment, Occupational Performance/Identification of Deficit Complexity: 3-5 performance deficits  Clinical Decision Making Complexity (OT): problem focused assessment/low complexity  Overall Complexity of Evaluation (OT): low complexity     Time Calculation- OT       Row Name 11/01/24 1152 11/01/24 1032          Time Calculation- OT    OT Received On 11/01/24 (P)   - --     OT Goal Re-Cert Due Date 11/07/24 (P)   - --        Timed Charges    95279 - OT Therapeutic Exercise Minutes 15 (P)   - --     33216 - Gait Training Minutes  -- 15  -     01248 - OT Self Care/Mgmt Minutes 8 (P)   - --        Total Minutes    Timed Charges Total Minutes 23 (P)   - 15  -      Total Minutes 23 (P)   - 15  -               User Key  (r) = Recorded By, (t) = Taken By, (c) = Cosigned By      Initials Name Provider Type    Re Camacho, KERI  Physical Therapist Assistant    Vicky Cummings, OT Student OT Student                  Therapy Charges for Today       Code Description Service Date Service Provider Modifiers Qty    99157400213  OT THERAPEUTIC ACT EA 15 MIN 10/31/2024 Vicky Toledo OT Student GO 1    [id number]  OT SELF CARE/MGMT/TRAIN EA 15 MIN 10/31/2024 Vicky Toledo OT Student GO 1                 LUZMARIA Antoine  11/1/2024

## 2024-11-01 NOTE — PLAN OF CARE
Goal Outcome Evaluation:  Plan of Care Reviewed With: patient           Outcome Evaluation: Pt is A&O X 4, on room air, and X 1 assist. All scheduled medications given as ordered. Pt complained of 4/10 pain, medication offered, pt stated he wanted to wait. Safety checks maintained throughout shift with pt resting in bed, bed in lowest position with wheels locked, and personal items and call light within reach. Pt will transfer to [place] for rehab, report called to EDUARDO Camacho.

## 2024-11-01 NOTE — PROGRESS NOTES
[facility]   Hospitalist Progress Note  Date: 2024  Patient Name: Gilberto Sanders  : 1950  MRN: [id number]  Date of admission: 10/28/2024      Subjective   Subjective     Chief Complaint: Medical management    Summary: Gilberto Sanders is a 74 y.o. male past medical history significant for coronary artery disease status post coronary artery bypass grafting, atrial fibrillation status post ablation on aspirin for anticoagulation, CKD stage IIIb, hypertension, hyperlipidemia, gout who recently fractured his right patella patient subsequently underwent open reduction internal fixation of right patella fracture hospitalist have been consulted postoperatively for medical management.     Interval Followup: 2024    No new complaints or overnight issues. Has brace on.    R knee pain reasonably controlled  VSS  Cr stable   No nausea. No anginal type CP or SOA.  No dizziness.  SUNRISE MANOR soon      Objective   Objective     Vitals:   Temp:  [97.9 °F (36.6 °C)-98.6 °F (37 °C)] 98.1 °F (36.7 °C)  Heart Rate:  [68-77] 72  Resp:  [16-18] 18  BP: (128-136)/(81-90) 136/86    Physical Exam    Constitutional: Awake, alert, NAD   Neck: Supple, no thyromegaly   Respiratory: Clear to auscultation bilaterally    Cardiovascular: RRR, no murmurs   Gastrointestinal: Soft, nontender, nondistended   Musculoskeletal: R knee immobilizer in place   Psychiatric: Appropriate affect, cooperative   Neurologic: Oriented x 3, speech clear   Skin: No rashes     Result Review    Result Review:  I have personally reviewed the results from the time of this admission to 2024 08:00 EDT and agree with these findings:  []  Laboratory  []  Microbiology  []  Radiology  []  EKG/Telemetry   []  Cardiology/Vascular   []  Pathology  []  Old records  []  Other:    Assessment & Plan   Assessment / Plan   Assessment:     Medical management  HTN  CAD/CABG/Stents   History of atrial fibrillation status post cardiac ablation maintaining  sinus rhythm on aspirin  History of second-degree AV block [place] type I  Hypertension  Hyperlipidemia  Gout  CKD3b     Plan:     Continue to monitor BP /  Continue with Coreg continue hydralazine  Holding losartan   Hold aspirin while patient is on Eliquis for DVT prophylaxis can resume after  Continue allopurinol   History of CKD stage IIIb .... Avoid nephrotoxics / NSAIDs  DC IV fluid.  WBAT but must have immobilizer on full extension.   (0-30 deg during ROM w therapist)   PT OT consultations ....... SUNRISE MANOR soon  Further treatment contingent upon his hospital course       Discussed plan with RN.    VTE Prophylaxis:  Pharmacologic VTE prophylaxis orders are present.    CODE STATUS:   Level Of Support Discussed With: Patient  Code Status (Patient has no pulse and is not breathing): CPR (Attempt to Resuscitate)  Medical Interventions (Patient has pulse or is breathing): Full Support

## 2024-11-01 NOTE — THERAPY TREATMENT NOTE
Acute Care - Physical Therapy Treatment Note   Maye     Patient Name: Gilberto Sanders  : 1950  MRN: [id number]  Today's Date: 2024      Visit Dx:     ICD-10-CM ICD-9-CM   1. Difficulty in walking  R26.2 719.7   2. Closed displaced fracture of right patella, unspecified fracture morphology, initial encounter  S82.001A 822.0     Patient Active Problem List   Diagnosis    S/P CABG x10 by Dr. Kruger    Coronary heart disease    Hyperlipidemia    Hypertension    Skin disorder    Status post coronary artery stent placement    Status post coronary artery bypass graft    Paroxysmal atrial fibrillation    Sick sinus syndrome    Acute respiratory failure with hypoxia    Closed displaced fracture of right patella    Right patella fracture     Past Medical History:   Diagnosis Date    -2023    Chronic kidney disease (CKD)     Coronary artery disease     Hyperlipidemia     Hypertension     Multiple rib fractures     from a fall early spring 2023, healed without intervention    Myocardial infarction     RSV (acute bronchiolitis due to respiratory syncytial virus) 2024    Skin cancer     back, chest, arms (lots of things burned off)     Past Surgical History:   Procedure Laterality Date    ABLATION OF DYSRHYTHMIC FOCUS      APPENDECTOMY      CARDIAC ELECTROPHYSIOLOGY PROCEDURE N/A 2023    Procedure: Ablation atrial fibrillation, RF, rep emailed;  Surgeon: Adrian Valentino MD;  Location: [facility] INVASIVE LOCATION;  Service: Cardiovascular;  Laterality: N/A;    CORONARY ANGIOPLASTY      CORONARY ARTERY BYPASS GRAFT  08/30/2017    X10 Dr Kruger    CORONARY STENT PLACEMENT  1984    3 stent    PATELLA OPEN REDUCTION INTERNAL FIXATION Right 10/28/2024    Procedure: PATELLA OPEN REDUCTION INTERNAL FIXATION;  Surgeon: Aníbal Montoya MD;  Location: [facility];  Service: Orthopedics;  Laterality: Right;    RENAL ARTERY STENT      SKIN CANCER EXCISION       PT Assessment (Last 12 Hours)        PT Evaluation and Treatment       [facility] Name 11/01/24 1032          Physical Therapy Time and Intention    Subjective Information complains of;pain  -     Document Type therapy note (daily note)  -     Mode of Treatment individual therapy;group therapy;physical therapy  -     Patient Effort good  -[facility] Name 11/01/24 1032          General Information    Patient Observations alert;cooperative;agree to therapy  -[facility] Name 11/01/24 1032          Pain    Pretreatment Pain Rating 2/10  -     Posttreatment Pain Rating 2/10  -[facility] Name 11/01/24 1032          Mobility    Extremity Weight-bearing Status right lower extremity  -     Right Lower Extremity (Weight-bearing Status) weight-bearing as tolerated (WBAT)  -[facility] Name 11/01/24 1032          Bed Mobility    Supine-Sit [place] (Bed Mobility) contact guard  -     Sit-Supine [place] (Bed Mobility) contact guard  -     Bed Mobility, Safety Issues decreased use of legs for bridging/pushing  -     Assistive Device (Bed Mobility) bed rails  -[facility] Name 11/01/24 1032          Transfers    Transfers sit-stand transfer;stand-sit transfer  -     Maintains Weight-bearing Status (Transfers) able to maintain  -SM       Row Name 11/01/24 1032          Sit-Stand Transfer    Sit-Stand [place] (Transfers) contact guard  -     Assistive Device (Sit-Stand Transfers) walker, front-wheeled  -[facility] Name 11/01/24 1032          Stand-Sit Transfer    Stand-Sit [place] (Transfers) contact guard  -     Assistive Device (Stand-Sit Transfers) walker, front-wheeled  -[facility] Name 11/01/24 1032          Gait/Stairs (Locomotion)    Gait/Stairs Locomotion gait/ambulation assistive device  -     [place] Level (Gait) contact guard  -     Assistive Device (Gait) walker, front-wheeled  -     Patient was able to Ambulate yes  -     Distance in Feet (Gait) 150  -SM     Pattern (Gait) step-to  -SM      Deviations/Abnormal Patterns (Gait) weight shifting decreased;base of support, wide  -     Bilateral Gait Deviations forward flexed posture  -     Right Sided Gait Deviations weight shift ability decreased;hip circumduction  -     Maintains Weight-bearing Status (Gait) able to maintain  -[facility] Name 11/01/24 1032          Safety Issues/Impairments Affecting Functional Mobility    Impairments Affecting Function (Mobility) balance;pain;range of motion (ROM);strength  -[facility] Name 11/01/24 1032          Balance    Balance Assessment standing dynamic balance  -     Dynamic Standing Balance contact guard  -     Position/Device Used, Standing Balance walker, front-wheeled  -[facility] Name 11/01/24 1032          Motor Skills    Therapeutic Exercise knee;hip;ankle  -[facility] Name 11/01/24 1032          Hip (Therapeutic Exercise)    Hip (Therapeutic Exercise) isometric exercises  -     Hip Isometrics (Therapeutic Exercise) right;gluteal sets;10 repetitions;2 sets  -SM       Row Name 11/01/24 1032          Knee (Therapeutic Exercise)    Knee (Therapeutic Exercise) isometric exercises;strengthening exercise  -     Knee Isometrics (Therapeutic Exercise) right;quad sets;10 repetitions;2 sets  -SM       Row Name 11/01/24 1032          Ankle (Therapeutic Exercise)    Ankle (Therapeutic Exercise) AROM (active range of motion)  -     Ankle AROM (Therapeutic Exercise) right;dorsiflexion;plantarflexion;10 repetitions;2 sets  -[facility] Name             Wound 10/28/24 Right anterior knee    Wound - Properties Group Placement Date: 10/28/24  - Side: Right  -JM Orientation: anterior  -JM Location: knee  -JM Primary Wound Type: Incision  -JM    Retired Wound - Properties Group Placement Date: 10/28/24  - Side: Right  -JM Orientation: anterior  -JM Location: knee  -JM Primary Wound Type: Incision  -JM    Retired Wound - Properties Group Placement Date: 10/28/24  - Side: Right  -JM Orientation:  anterior  -JM Location: knee  -JM Primary Wound Type: Incision  -JM    Retired Wound - Properties Group Date first assessed: 10/28/24  -JM Side: Right  -JM Location: knee  -JM Primary Wound Type: Incision  -JM      Row Name 11/01/24 1032          Positioning and Restraints    Pre-Treatment Position in bed  -SM     Post Treatment Position bed  -SM     In Bed supine;call light within reach;exit alarm on;with other staff;RLE elevated  -       Row Name 11/01/24 1032          Progress Summary (PT)    Progress Toward Functional Goals (PT) progress toward functional goals is good  -     Daily Progress Summary (PT) Pt is progressing well with their exercise program.  Will continue current plan of care.  -               User Key  (r) = Recorded By, (t) = Taken By, (c) = Cosigned By      Initials Name Provider Type    Jaleesa Moore RN Registered Nurse    Re Camacho PTA Physical Therapist Assistant                    Physical Therapy Education        No education to display                  PT Recommendation and Plan     Progress Summary (PT)  Progress Toward Functional Goals (PT): progress toward functional goals is good  Daily Progress Summary (PT): Pt is progressing well with their exercise program.  Will continue current plan of care.   Outcome Measures       Row Name 11/01/24 1034 10/31/24 1535 10/30/24 1500       How much help from another person do you currently need...    Turning from your back to your side while in flat bed without using bedrails? 4  -SM 4  -SM 3  -YANELI (r) EW (t) YANELI (c)    Moving from lying on back to sitting on the side of a flat bed without bedrails? 4  -SM 4  -SM 3  -YANELI (r) EW (t) YANELI (c)    Moving to and from a bed to a chair (including a wheelchair)? 3  -SM 3  -SM 3  -YANELI (r) EW (t) YANELI (c)    Standing up from a chair using your arms (e.g., wheelchair, bedside chair)? 3  -SM 4  -SM 3  -YANELI (r) EW (t) YANELI (c)    Climbing 3-5 steps with a railing? 3  -SM 3  -SM 3  -YANELI (r) EW (t) YANELI (c)     To walk in hospital room? 3  -SM 3  -SM 3  -YANELI (r) EW (t) YANELI (c)    AM-PAC 6 Clicks Score (PT) 20  -SM 21  -SM 18  -YANELI (r) EW (t)       How much help from another is currently needed...    Putting on and taking off regular lower body clothing? -- -- 3  -YANELI (r) EW (t) YANELI (c)       Functional Assessment    Outcome Measure Options -- -- AM-PAC 6 Clicks Basic Mobility (PT)  -YANELI (r) EW (t) YANELI (c)              User Key  (r) = Recorded By, (t) = Taken By, (c) = Cosigned By      Initials Name Provider Type    Sean Ross, PT Physical Therapist    Re Camacho, KERI Physical Therapist Assistant    EW Ovidio Lee, PT Student PT Student                     Time Calculation:    PT Charges       Row Name 11/01/24 1032             Time Calculation    PT Received On 11/01/24  -SM         Timed Charges    80244 - PT Therapeutic Exercise Minutes 8  -SM      50798 - Gait Training Minutes  15  -SM         Total Minutes    Timed Charges Total Minutes 23  -SM       Total Minutes 23  -SM                User Key  (r) = Recorded By, (t) = Taken By, (c) = Cosigned By      Initials Name Provider Type    Re Camacho PTA Physical Therapist Assistant                  Therapy Charges for Today       Code Description Service Date Service Provider Modifiers Qty    41703737068 HC PT THER PROC EA 15 MIN 10/31/2024 Re Espinoza PTA GP 1    30021809159 HC GAIT TRAINING EA 15 MIN 10/31/2024 Re Espinoza PTA GP 1            PT G-Codes  Outcome Measure Options: AM-PAC 6 Clicks Daily Activity (OT)  AM-PAC 6 Clicks Score (PT): 20  AM-PAC 6 Clicks Score (OT): 17    Re Espinoza PTA  11/1/2024

## 2024-11-01 NOTE — DISCHARGE SUMMARY
[facility]  HOSPITALIST  DISCHARGE SUMMARY       Patient Name: Gilberto Sanders  : 1950  MRN: [id number]  Primary Care Physician: Amarilys Bear MD    Date of Admission: 10/28/2024  Date of Discharge: 2024  Discharge Diagnoses   Status post fall with right patella fracture  Status post ORIF right patella fracture 10/28/24 by Dr. Montoya  HTN  CAD/CABG/Stents, stable  History of atrial fibrillation status post cardiac ablation maintaining sinus rhythm on aspirin  History of second-degree AV block Mobitz type I  Hypertension  Hyperlipidemia  Gout  CKD3b, stable  Hospital Course   Hospital Course:  Gilberto Sanders is a pleasant 74 y.o. male Gilberto Sanders is a 74 y.o. male past medical history significant for coronary artery disease status post coronary artery bypass grafting, atrial fibrillation status post ablation on aspirin for anticoagulation, CKD stage IIIb, hypertension, hyperlipidemia, gout who recently tripped, fell and fractured his right patella; patient subsequently underwent open reduction internal fixation of right patella fracture on 10/28/24.  Hospitalist was consulted postoperatively for general medical management.  He had an uneventful postop course.  His home meds were continued with the exception of losartan being held for BP.  This likely can be resumed in the near future.  His home aspirin is on hold while on Eliquis for DVT prophylaxis after the surgery/continued immobilization.  Orthopedic surgeon recommending weightbearing as tolerated but he must have immobilizer on locked in full extension during this.  Some range of motion exercises without immobilizer 0 to 30 degrees with therapy.  Patient's pain has been reasonably controlled.  He remains medically stable.  He needs additional rehab and is now being discharged to [facility] rehab facility.  He will follow-up with PCP after rehab.  He will follow-up with orthopedist in approximately 2 weeks.    Discharge  Follow Up / Recommendations (labs, diagnostics, meds, etc):   As above  Future Appointments   Date Time Provider Department Center   11/11/2024  2:15 PM Efraín Clements PA [facility]   12/31/2024 11:00 AM Morris Sanz MD [facility]   10/21/2025 11:30 AM Adrian Valentino MD [facility]     Consultants     Consults       Date and Time Order Name Status Description    10/28/2024  4:07 PM Inpatient Hospitalist Consult            On Day of Discharge   VS: Temp:  [97.9 °F (36.6 °C)-98.2 °F (36.8 °C)] 97.9 °F (36.6 °C)  Heart Rate:  [70-77] 75  Resp:  [16-18] 18  BP: (130-145)/(81-96) 145/96  EXAM:  (refer to progress note from 11/1/2024)     Discharge Medications        Continue These Medications        Instructions Start Date   allopurinol 300 MG tablet  Commonly known as: ZYLOPRIM   300 mg, Nightly      aspirin 325 MG tablet   325 mg, Oral, Daily      atorvastatin 40 MG tablet  Commonly known as: LIPITOR   40 mg, Oral, Nightly      carvedilol 3.125 MG tablet  Commonly known as: COREG   3.125 mg, Oral, 2 Times Daily      famotidine 20 MG tablet  Commonly known as: PEPCID   20 mg, 2 Times Daily      ferrous gluconate 324 MG tablet  Commonly known as: FERGON   324 mg, Daily With Breakfast      hydrALAZINE 100 MG tablet  Commonly known as: APRESOLINE   0.5 tablets, Oral, 2 Times Daily      losartan 25 MG tablet  Commonly known as: COZAAR   25 mg, Oral, 2 Times Daily      Ventolin  (90 Base) MCG/ACT inhaler  Generic drug: albuterol sulfate HFA   2 puffs, Inhalation, Every 4 Hours PRN             Procedures   ORIF right patella fracture  Imaging     Discharge Details   Hospital Diet:     Diet Order   Procedures    Diet: Regular/House; Fluid Consistency: Thin (IDDSI 0)     CODE STATUS:    Code Status and Medical Interventions: CPR (Attempt to Resuscitate); Full Support   Ordered at: 10/29/24 0741     Level Of Support Discussed With:    Patient     Code Status (Patient has no  pulse and is not breathing):    CPR (Attempt to Resuscitate)     Medical Interventions (Patient has pulse or is breathing):    Full Support       Discharge Disposition: Home or Self Care  Pertinent  Labs   LAB RESULTS:      Lab 11/01/24  0420   WBC 11.53*   HEMOGLOBIN 12.3*   HEMATOCRIT 37.1*   PLATELETS 228   NEUTROS ABS 8.52*   IMMATURE GRANS (ABS) 0.07*   LYMPHS ABS 1.53   MONOS ABS 1.09*   EOS ABS 0.22   MCV 96.6         Lab 11/01/24  0420 10/31/24  0800 10/29/24  0403 10/28/24  1113   SODIUM 137 138 136 137   POTASSIUM 4.2 3.9 4.7  --    CHLORIDE 105 104 103 103   CO2 19.3* 23.9 21.3* 19.5*   ANION GAP 12.7 10.1 11.7 14.5   BUN 38* 38* 42* 42*   CREATININE 1.51* 1.57* 1.74* 1.85*   EGFR 48.2* 46.0* 40.6* 37.7*   GLUCOSE 100* 90 126* 80   CALCIUM 10.0 9.8 9.5 10.1   PHOSPHORUS  --   --  3.8  --          Lab 10/29/24  0403   ALBUMIN 3.7                     Brief Urine Lab Results  (Last result in the past 365 days)        Color   Clarity   Blood   Leuk Est   Nitrite   Protein   CREAT   Urine HCG        10/16/24 0925             59.6               Microbiology Results (last 10 days)       ** No results found for the last 240 hours. **          Labs Pending at Discharge:   Time spent on Discharge including face to face service: > 30 minutes  Electronically signed by JARED Power, 11/01/24, 11:34 AM EDT.

## 2024-11-01 NOTE — PROGRESS NOTES
[facility]     Progress Note    Patient Name: Gilberto Sanders  : 1950  MRN: [id number]  Primary Care Physician:  Amarilys Bear MD  Date of admission: 10/28/2024    Subjective   Subjective     HPI:  Patient Reports still having some pain and discomfort in the knee.  No new complaints.  Awaiting rehab discharge.    Review of Systems   See HPI    Objective   Objective     Vitals:   Temp:  [97.9 °F (36.6 °C)-98.6 °F (37 °C)] 98.1 °F (36.7 °C)  Heart Rate:  [68-77] 72  Resp:  [16-18] 18  BP: (128-136)/(81-90) 136/86  Physical Exam    General: Alert, no acute distress   Chest: Unlabored breathing, cardiovascular: Regular heart rate   Musculoskeletal: Dressing intact.  Brace intact.  Neurovascular intact.    Result Review      WBC   Date Value Ref Range Status   2024 11.53 (H) 3.40 - 10.80 10*3/mm3 Final     RBC   Date Value Ref Range Status   2024 3.84 (L) 4.14 - 5.80 10*6/mm3 Final     Hemoglobin   Date Value Ref Range Status   2024 12.3 (L) 13.0 - 17.7 g/dL Final     Hematocrit   Date Value Ref Range Status   2024 37.1 (L) 37.5 - 51.0 % Final     MCV   Date Value Ref Range Status   2024 96.6 79.0 - 97.0 fL Final     MCH   Date Value Ref Range Status   2024 32.0 26.6 - 33.0 pg Final     MCHC   Date Value Ref Range Status   2024 33.2 31.5 - 35.7 g/dL Final     RDW   Date Value Ref Range Status   2024 15.1 12.3 - 15.4 % Final     RDW-SD   Date Value Ref Range Status   2024 53.1 37.0 - 54.0 fl Final     MPV   Date Value Ref Range Status   2024 11.1 6.0 - 12.0 fL Final     Platelets   Date Value Ref Range Status   2024 228 140 - 450 10*3/mm3 Final     Neutrophil %   Date Value Ref Range Status   2024 73.8 42.7 - 76.0 % Final     Lymphocyte %   Date Value Ref Range Status   2024 13.3 (L) 19.6 - 45.3 % Final     Monocyte %   Date Value Ref Range Status   2024 9.5 5.0 - 12.0 % Final     Eosinophil %   Date Value Ref Range Status    11/01/2024 1.9 0.3 - 6.2 % Final     Basophil %   Date Value Ref Range Status   11/01/2024 0.9 0.0 - 1.5 % Final     Immature Grans %   Date Value Ref Range Status   11/01/2024 0.6 (H) 0.0 - 0.5 % Final     Neutrophils, Absolute   Date Value Ref Range Status   11/01/2024 8.52 (H) 1.70 - 7.00 10*3/mm3 Final     Lymphocytes, Absolute   Date Value Ref Range Status   11/01/2024 1.53 0.70 - 3.10 10*3/mm3 Final     Monocytes, Absolute   Date Value Ref Range Status   11/01/2024 1.09 (H) 0.10 - 0.90 10*3/mm3 Final     Eosinophils, Absolute   Date Value Ref Range Status   11/01/2024 0.22 0.00 - 0.40 10*3/mm3 Final     Basophils, Absolute   Date Value Ref Range Status   11/01/2024 0.10 0.00 - 0.20 10*3/mm3 Final     Immature Grans, Absolute   Date Value Ref Range Status   11/01/2024 0.07 (H) 0.00 - 0.05 10*3/mm3 Final     nRBC   Date Value Ref Range Status   11/01/2024 0.0 0.0 - 0.2 /100 WBC Final        Result Review:  I have personally reviewed the results from the time of this admission to 11/1/2024 07:45 EDT and agree with these findings:  [x]  Laboratory  []  Microbiology  []  Radiology  []  EKG/Telemetry   []  Cardiology/Vascular   []  Pathology  []  Old records  []  Other:      Assessment & Plan   Assessment / Plan     Brief Patient Summary:  Gilberto Sanders is a 74 y.o. male who is status post right patella ORIF    Active Hospital Problems:  Active Hospital Problems    Diagnosis     **Closed displaced fracture of right patella     Right patella fracture      Plan: Weightbearing as tolerated with brace locked in extension  Unlock brace 0 to 30 degree range of motion, PT OT  Pain control  DVT prophylaxis  Discharge planning: Inpatient rehab when able       VTE Prophylaxis:  Pharmacologic VTE prophylaxis orders are present.        CODE STATUS:   Level Of Support Discussed With: Patient  Code Status (Patient has no pulse and is not breathing): CPR (Attempt to Resuscitate)  Medical Interventions (Patient has pulse or is  breathing): Full Support    Disposition:  I expect patient to be discharged to inpatient rehab when able.    Electronically signed by Aníbal Montoya MD, 11/01/24, 7:45 AM EDT.

## 2024-11-01 NOTE — PLAN OF CARE
Goal Outcome Evaluation:  Plan of Care Reviewed With: patient           Outcome Evaluation: Pt is A&O X 4, on room air, and X 1 assist. All scheduled medications given as ordered. Pt had no complaints of pain during shift. VSS. Safety checks maintained throughout shift with pt resting in bed, bed in lowest position, wheels to bed locked, and personal items and call light within reach. Pt waiting on rehab placement.

## 2024-11-11 ENCOUNTER — OFFICE VISIT (OUTPATIENT)
Dept: ORTHOPEDIC SURGERY | Facility: CLINIC | Age: 74
End: 2024-11-11
Payer: OTHER MISCELLANEOUS

## 2024-11-11 VITALS — OXYGEN SATURATION: 90 % | SYSTOLIC BLOOD PRESSURE: 120 MMHG | DIASTOLIC BLOOD PRESSURE: 76 MMHG | HEART RATE: 95 BPM

## 2024-11-11 DIAGNOSIS — Z47.89 AFTERCARE FOLLOWING SURGERY OF THE MUSCULOSKELETAL SYSTEM: Primary | ICD-10-CM

## 2024-11-11 NOTE — PROGRESS NOTES
Chief Complaint  Follow-up of the Right Knee and Suture / Staple Removal    Subjective          History of Present Illness      Gilberto Sanders is a 74 y.o. male  presents to [facility] for     Patient presents for 2-week postop evaluation of right patella open reduction internal fixation 10/28/2024.  He presents in a ACL brace locked in extension with flexion at 30 degrees.  He has been staying at [facility].  He states they are giving him pain medicine as needed.  He states his pain is controlled.  He states he still has swelling to the knee he denies redness drainage or dehiscence from the incision site.  He remains off of work.  He came with an x-ray already taken from [facility].  He denies calf pain.      No Known Allergies     Social History     Socioeconomic History    Marital status:    Tobacco Use    Smoking status: Never     Passive exposure: Never    Smokeless tobacco: Never   Vaping Use    Vaping status: Never Used   Substance and Sexual Activity    Alcohol use: No    Drug use: No    Sexual activity: Defer     Partners: Female     Birth control/protection: Condom, Spermicide        REVIEW OF SYSTEMS    Constitutional: Awake alert and oriented x3, no acute distress, denies fevers, chills, weight loss  Respiratory: No respiratory distress  Vascular: Brisk cap refill, Intact distal pulses, No cyanosis, compartments soft with no signs or symptoms of compartment syndrome or DVT.   Cardiovascular: Denies chest pain, shortness of breath  Skin: Denies rashes, acute skin changes  Neurologic: Denies headache, loss of consciousness  MSK: Right knee pain      Objective   Vital Signs:   /76   Pulse 95   SpO2 90%     There is no height or weight on file to calculate BMI.    Physical Exam       Right knee: Incision is healing well, no erythema, no drainage or dehiscence, no signs of infection, active range of motion limited secondary to stiffness and pain, passive  extension -5, flexion 30 degrees, nontender calf, negative Danica testing, mild generalized swelling to the knee,.      Procedures    Imaging Results (Most Recent)       None        X-ray [facility], 11/7/2024, results: Fixated patella fracture in anatomic position.  Conclusion: Post patella fracture fixation.    Result Review :   The following data was reviewed by: JARED Chakraborty on 11/11/2024:  Data reviewed : Radiologic studies reviewed by me with the patient              Assessment and Plan    Diagnoses and all orders for this visit:    1. Aftercare following surgery of Right PATELLA OPEN REDUCTION INTERNAL FIXATION 10/28/24 (Primary)        Reviewed x-rays with the patient discussed diagnosis and treatment options with him he was advised to continue brace use with locked in extension when ambulating with walker use.  Continue therapy he was given physical therapy protocol for ORIF patella.  Continue pain medicine as needed, continue incision care, he had incision checks daily ordered for him.  Follow-up in 4 weeks for recheck.  X-ray was ordered prior to next visit    Call or return if worsening symptoms.    Follow Up   Return in about 4 weeks (around 12/9/2024) for Recheck.  Patient was given instructions and counseling regarding his condition or for health maintenance advice. Please see specific information pulled into the AVS if appropriate.       EMR Dragon/Transcription disclaimer:  Part of this note may be an electronic transcription/translation of spoken language to printed text using the Dragon Dictation System

## 2024-11-13 ENCOUNTER — TELEPHONE (OUTPATIENT)
Dept: ORTHOPEDIC SURGERY | Facility: CLINIC | Age: 74
End: 2024-11-13

## 2024-11-13 NOTE — TELEPHONE ENCOUNTER
Caller: JANE RUCKER/[employer]    Relationship:   WORK [employer]  Best call back number: [phone]    What form or medical record are you requestin24 PN AND WORK STATUS    Who is requesting this form or medical record from you: [employer]    How would you like to receive the form or medical records (pick-up, mail, fax): FAX  If fax, what is the fax number: [phone]  ATTN:  [id number]

## 2024-11-15 ENCOUNTER — TELEPHONE (OUTPATIENT)
Dept: ORTHOPEDIC SURGERY | Facility: CLINIC | Age: 74
End: 2024-11-15

## 2024-11-15 DIAGNOSIS — R26.2 DIFFICULTY IN WALKING: ICD-10-CM

## 2024-11-15 DIAGNOSIS — Z47.89 AFTERCARE FOLLOWING SURGERY OF THE MUSCULOSKELETAL SYSTEM: Primary | ICD-10-CM

## 2024-11-15 NOTE — TELEPHONE ENCOUNTER
Caller: JANE ESPINOZA    Relationship: Other    Best call back number: [phone]    What is the medical concern/diagnosis: KNEE FX - POST OP    What specialty or service is being requested: HOME HEATLH    What is the provider, practice or medical service name:     What is the office location:     What is the office phone number:     Any additional details: W/C NCM CALLED AND STATED THAT THE PATIENT IS BEING DISCHARGED FROM THE REHAB FACILITY THAT HE IS AT AND NEEDS TO HAVE ORDERS FOR HH AND SPECFICS ON WHAT TYPE OF HH HE NEEDS AND W/C NEEDS THAT ORDER AS WELL. PLEASE FAX .[phone] PLEASE PUT CLAIM# [id number]    W/C NEEDS THE ORDER TO SEND TO ONE OF THEIR HOME HEALTH ANGICIES DUE TO [facility] NOT ACCEPTING THE PT. THEY ARE GOING TO SEND IT TO [facility].    PT BEING DISCHARGED TOMORROW - NEEDS ASAP

## 2024-12-03 ENCOUNTER — TELEPHONE (OUTPATIENT)
Dept: ORTHOPEDIC SURGERY | Facility: CLINIC | Age: 74
End: 2024-12-03
Payer: COMMERCIAL

## 2024-12-03 DIAGNOSIS — Z47.89 AFTERCARE FOLLOWING SURGERY OF THE MUSCULOSKELETAL SYSTEM: Primary | ICD-10-CM

## 2024-12-03 DIAGNOSIS — S82.001A CLOSED DISPLACED FRACTURE OF RIGHT PATELLA, UNSPECIFIED FRACTURE MORPHOLOGY, INITIAL ENCOUNTER: ICD-10-CM

## 2024-12-03 NOTE — TELEPHONE ENCOUNTER
RECEIVED EMAIL FROM [facility] AT [address]. PATIENT'S WORK COMP IS UNABLE TO FIND A HH AGENCY AND IS ARRANGING TRANSPORTATION FOR PATIENT TO ATTEND OUTPATIENT PT AT [address]. ORDER FAXED.

## 2024-12-12 ENCOUNTER — OFFICE VISIT (OUTPATIENT)
Dept: ORTHOPEDIC SURGERY | Facility: CLINIC | Age: 74
End: 2024-12-12
Payer: OTHER MISCELLANEOUS

## 2024-12-12 VITALS
OXYGEN SATURATION: 94 % | HEART RATE: 65 BPM | DIASTOLIC BLOOD PRESSURE: 69 MMHG | SYSTOLIC BLOOD PRESSURE: 116 MMHG | WEIGHT: 182 LBS | HEIGHT: 68 IN | BODY MASS INDEX: 27.58 KG/M2

## 2024-12-12 DIAGNOSIS — M25.561 RIGHT KNEE PAIN, UNSPECIFIED CHRONICITY: ICD-10-CM

## 2024-12-12 DIAGNOSIS — Z47.89 AFTERCARE FOLLOWING SURGERY OF THE MUSCULOSKELETAL SYSTEM: Primary | ICD-10-CM

## 2024-12-12 NOTE — PROGRESS NOTES
"Chief Complaint  Follow-up of the Right Knee    Subjective          History of Present Illness      Gilberto Sanders is a 74 y.o. male  presents to [facility] for     Patient presents for follow-up evaluation of right patella open reduction internal fixation 10/28/2024.  This is a Workmen's Comp. injury.  He presents in his ACL brace with no cane or walker use.  He states that he has been compliant with brace use he states he is not taking any pain medication and denies need for pain control.  He states the incision has healed well and denies redness drainage or dehiscence he is attending therapy at [address]  He states that he is happy with his progress.      No Known Allergies     Social History     Socioeconomic History    Marital status:    Tobacco Use    Smoking status: Never     Passive exposure: Never    Smokeless tobacco: Never   Vaping Use    Vaping status: Never Used   Substance and Sexual Activity    Alcohol use: No    Drug use: No    Sexual activity: Defer     Partners: Female     Birth control/protection: Condom, Spermicide        REVIEW OF SYSTEMS    Constitutional: Awake alert and oriented x3, no acute distress, denies fevers, chills, weight loss  Respiratory: No respiratory distress  Vascular: Brisk cap refill, Intact distal pulses, No cyanosis, compartments soft with no signs or symptoms of compartment syndrome or DVT.   Cardiovascular: Denies chest pain, shortness of breath  Skin: Denies rashes, acute skin changes  Neurologic: Denies headache, loss of consciousness  MSK: Right knee pain      Objective   Vital Signs:   /69   Pulse 65   Ht 172.1 cm (67.76\")   Wt 82.6 kg (182 lb)   SpO2 94%   BMI 27.87 kg/m²     Body mass index is 27.87 kg/m².    Physical Exam       Right knee: Nontender to palpation, well-healed incision, no erythema, no ecchymosis or swelling, no effusion or signs of infection, patient able hold straight leg raise full extension " flexion 120, stable to varus/valgus stress stable anterior/posterior drawer, nontender calf, negative Danica testing, no pain with range of motion.      Procedures    Imaging Results (Most Recent)       Procedure Component Value Units Date/Time    XR Knee 1 or 2 View Right [[id number]] Resulted: 12/12/24 1448     Updated: 12/12/24 1448    Narrative:      View:AP/Lateral view(s)  Site: Right knee  Indication: Right knee pain  Study: X-rays ordered, taken in the office, and reviewed today  X-ray: Good healing of patella fracture with intact screws, no signs of   hardware failure or loosening.  Comparative data: Previous studies             Result Review :   The following data was reviewed by: JARED Chakraborty on 12/12/2024:  Data reviewed : Radiologic studies reviewed by me with the patient              Assessment and Plan    Diagnoses and all orders for this visit:    1. Aftercare following surgery of Right PATELLA OPEN REDUCTION INTERNAL FIXATION 10/28/24 (Primary)  -     Ambulatory Referral to Physical Therapy for Evaluation & Treatment    2. Right knee pain, unspecified chronicity  -     XR Knee 1 or 2 View Right  -     Ambulatory Referral to Physical Therapy for Evaluation & Treatment        Reviewed x-rays with the patient discussed diagnosis and treatment options with him he was advised he will be taken out of the ACL brace and placed into a regular knee brace, use with activities, continue therapy and new order was given, remain off of work for now follow-up in 6 weeks.    Call or return if worsening symptoms.    Follow Up   Return in about 6 weeks (around 1/23/2025) for Recheck.  Patient was given instructions and counseling regarding his condition or for health maintenance advice. Please see specific information pulled into the AVS if appropriate.       EMR Dragon/Transcription disclaimer:  Part of this note may be an electronic transcription/translation of spoken language to printed text using the  [employer] Dictation System

## 2024-12-31 ENCOUNTER — OFFICE VISIT (OUTPATIENT)
Dept: CARDIOLOGY | Facility: CLINIC | Age: 74
End: 2024-12-31
Payer: MEDICARE

## 2024-12-31 VITALS
HEIGHT: 68 IN | HEART RATE: 76 BPM | BODY MASS INDEX: 27.58 KG/M2 | DIASTOLIC BLOOD PRESSURE: 85 MMHG | SYSTOLIC BLOOD PRESSURE: 124 MMHG | WEIGHT: 182 LBS | OXYGEN SATURATION: 97 %

## 2024-12-31 DIAGNOSIS — E78.00 PURE HYPERCHOLESTEROLEMIA: ICD-10-CM

## 2024-12-31 DIAGNOSIS — I25.10 CORONARY ARTERY DISEASE INVOLVING NATIVE CORONARY ARTERY OF NATIVE HEART WITHOUT ANGINA PECTORIS: Primary | ICD-10-CM

## 2024-12-31 DIAGNOSIS — I10 PRIMARY HYPERTENSION: ICD-10-CM

## 2024-12-31 DIAGNOSIS — I48.0 PAROXYSMAL ATRIAL FIBRILLATION: ICD-10-CM

## 2024-12-31 NOTE — PROGRESS NOTES
Subjective:     Encounter Date:12/31/2024      Patient ID: Gilberto Sanders is a 74 y.o. male.    Chief Complaint:  History of Present Illness 74-year-old white male with history of coronary disease status post coronary bypass surgery history of paroxysmal fibrillation hypertension hyperlipidemia presents to my office for a follow-up.  Patient is currently stable without any symptoms of chest pain or shortness of breath at rest or exertion.  No complaint of any PND orthopnea.  No palpitations dizziness syncope or swelling of the feet.  Patient is taking all the medicines regularly.  Patient does not smoke.    The following portions of the patient's history were reviewed and updated as appropriate: allergies, current medications, past family history, past medical history, past social history, past surgical history, and problem list.  Past Medical History:   Diagnosis Date    A-fib 2023    Chronic kidney disease (CKD)     Coronary artery disease     Hyperlipidemia     Hypertension     Multiple rib fractures 2023    from a fall early spring 2023, healed without intervention    Myocardial infarction     RSV (acute bronchiolitis due to respiratory syncytial virus) 01/01/2024    Skin cancer     back, chest, arms (lots of things burned off)     Past Surgical History:   Procedure Laterality Date    ABLATION OF DYSRHYTHMIC FOCUS      APPENDECTOMY      CARDIAC ELECTROPHYSIOLOGY PROCEDURE N/A 06/30/2023    Procedure: Ablation atrial fibrillation, RF, rep emailed;  Surgeon: Adrian Valentino MD;  Location: [facility] CATH INVASIVE LOCATION;  Service: Cardiovascular;  Laterality: N/A;    CORONARY ANGIOPLASTY      CORONARY ARTERY BYPASS GRAFT  08/30/2017    X10 Dr Kruger    CORONARY STENT PLACEMENT  1984    3 stent    PATELLA OPEN REDUCTION INTERNAL FIXATION Right 10/28/2024    Procedure: PATELLA OPEN REDUCTION INTERNAL FIXATION;  Surgeon: Aníbal Montoya MD;  Location: [facility];  Service: Orthopedics;  Laterality:  "Right;    RENAL ARTERY STENT      SKIN CANCER EXCISION       /85   Pulse 76   Ht 171.5 cm (67.5\")   Wt 82.6 kg (182 lb)   SpO2 97%   BMI 28.08 kg/m²   Family History   Problem Relation Age of Onset    Cancer Mother     Stroke Father     Heart disease Father     Malig Hyperthermia Neg Hx        Current Outpatient Medications:     albuterol sulfate  (90 Base) MCG/ACT inhaler, Inhale 2 puffs Every 4 (Four) Hours As Needed for Wheezing or Shortness of Air., Disp: 8 g, Rfl: 0    allopurinol (ZYLOPRIM) 300 MG tablet, Take 1 tablet by mouth Every Night., Disp: , Rfl:     apixaban (ELIQUIS) 2.5 MG tablet tablet, Take 1 tablet by mouth Every 12 (Twelve) Hours. Indications: Prevention of Unwanted Clot in Veins, Disp: , Rfl:     aspirin 325 MG tablet, Take 1 tablet by mouth Daily., Disp: , Rfl:     atorvastatin (LIPITOR) 40 MG tablet, Take 1 tablet by mouth Every Night., Disp: , Rfl:     carvedilol (COREG) 3.125 MG tablet, Take 1 tablet by mouth 2 (Two) Times a Day., Disp: 180 tablet, Rfl: 3    famotidine (PEPCID) 20 MG tablet, Take 1 tablet by mouth 2 (Two) Times a Day., Disp: , Rfl:     ferrous gluconate (FERGON) 324 MG tablet, Take 1 tablet by mouth Daily With Breakfast., Disp: , Rfl:     hydrALAZINE (APRESOLINE) 100 MG tablet, Take 0.5 tablets by mouth 2 (Two) Times a Day., Disp: , Rfl:     losartan (COZAAR) 25 MG tablet, Take 1 tablet by mouth 2 (Two) Times a Day., Disp: , Rfl:   No Known Allergies  Social History     Socioeconomic History    Marital status:    Tobacco Use    Smoking status: Never     Passive exposure: Never    Smokeless tobacco: Never   Vaping Use    Vaping status: Never Used   Substance and Sexual Activity    Alcohol use: No    Drug use: No    Sexual activity: Defer     Partners: Female     Birth control/protection: Condom, Spermicide     Review of Systems   Constitutional: Negative for malaise/fatigue.   Cardiovascular:  Negative for chest pain, dyspnea on exertion, leg " swelling and palpitations.   Respiratory:  Negative for cough and shortness of breath.    Skin:  Negative for rash.   Gastrointestinal:  Negative for abdominal pain, nausea and vomiting.   Neurological:  Negative for dizziness, focal weakness, headaches, light-headedness and numbness.   All other systems reviewed and are negative.             Objective:     Constitutional:       Appearance: Well-developed.   Eyes:      General: No scleral icterus.     Conjunctiva/sclera: Conjunctivae normal.   HENT:      Head: Normocephalic and atraumatic.   Neck:      Vascular: No carotid bruit or JVD.   Pulmonary:      Effort: Pulmonary effort is normal.      Breath sounds: Normal breath sounds. No wheezing. No rales.   Cardiovascular:      Normal rate. Regular rhythm.   Pulses:     Intact distal pulses.   Abdominal:      General: Bowel sounds are normal.      Palpations: Abdomen is soft.   Musculoskeletal:      Cervical back: Normal range of motion and neck supple. Skin:     General: Skin is warm and dry.      Findings: No rash.   Neurological:      Mental Status: Alert.       Procedures    Lab Review:         MDM    #1 coronary disease  Patient had a coronary bypass surgery x 10 vessels and is currently stable with normal V function.  2.  Hypertension  Patient blood pressure currently stable on losartan hydralazine and carvedilol.  3.  Hyperlipidemia  Patient is on atorvastatin the lipid levels are well within normal limits  4.  Paroxysmal atrial fibrillation  Patient is currently stable on Eliquis and also on carvedilol for rate control.    Patient's previous medical records, labs, and EKG were reviewed and discussed with the patient at today's visit.

## 2025-01-16 ENCOUNTER — TELEPHONE (OUTPATIENT)
Dept: CARDIOLOGY | Facility: CLINIC | Age: 75
End: 2025-01-16
Payer: COMMERCIAL

## 2025-01-16 NOTE — TELEPHONE ENCOUNTER
Caller: Gilberto Sanders    Relationship: Self    Best call back number: [phone]    What was the call regarding: PATIENT HAD SURGERY ON HIS LEG AND WAS PRESCRIBED ELIQUIS 2.5 MG TO HELP WITH CLOTTING. PATIENT STATED THAT HE IS OUT THE MEDICATION AND WANTS TO KNOW IF DR DE JESUS WANTS HIM TO CONTINUE THE THERAPY AND IF SO HE WILL NEED A PRESCRIPTION. PLEASE ADVISE.       [facility] Quality Flow/Interdisciplinary Rounds Progress Note        Quality Flow Rounds held on August 19, 2021    Disciplines Attending:  Bedside Nurse, ,  and Nursing Unit Leadership    Araseli Estrada was admitted on 8/19/2021  6:16 AM    Anticipated Discharge Date:  Expected Discharge Date: 08/20/21    Disposition:    Glynn Score:  Glynn Scale Score: 18    Readmission Risk              Risk of Unplanned Readmission:  6           Discussed patient goal for the day, patient clinical progression, and barriers to discharge.   The following Goal(s) of the Day/Commitment(s) have been identified:  Discharge [id number] Kourtney Haji RN  August 19, 2021

## 2025-01-16 NOTE — TELEPHONE ENCOUNTER
Called patient and verbally explained that he still needs to keep taking eliquis.    Patient verbally understood and had no further questions or concerns at this time.    Sent in Rx

## 2025-01-16 NOTE — TELEPHONE ENCOUNTER
Per LOV patient is on Eliquis for paroxysmal atrial fibrillation  Yes he needs to continue to take medication at this time

## 2025-01-23 ENCOUNTER — OFFICE VISIT (OUTPATIENT)
Dept: ORTHOPEDIC SURGERY | Facility: CLINIC | Age: 75
End: 2025-01-23
Payer: OTHER MISCELLANEOUS

## 2025-01-23 VITALS
DIASTOLIC BLOOD PRESSURE: 72 MMHG | WEIGHT: 180 LBS | SYSTOLIC BLOOD PRESSURE: 107 MMHG | HEART RATE: 68 BPM | OXYGEN SATURATION: 92 % | HEIGHT: 68 IN | BODY MASS INDEX: 27.28 KG/M2

## 2025-01-23 DIAGNOSIS — M25.561 RIGHT KNEE PAIN, UNSPECIFIED CHRONICITY: ICD-10-CM

## 2025-01-23 DIAGNOSIS — Z47.89 AFTERCARE FOLLOWING SURGERY OF THE MUSCULOSKELETAL SYSTEM: Primary | ICD-10-CM

## 2025-01-23 NOTE — PROGRESS NOTES
"Chief Complaint  Follow-up of the Right Knee    Subjective          History of Present Illness      Gilberto Sanders is a 74 y.o. male  presents to [facility] for     Patient presents for follow-up evaluation of right patella ORIF, 10/28/2024 this is a Workmen's Comp. injury.  He presents without a brace he states that he has been doing better he continues to attend therapy he states to return to his job he will have to be able to lift 100 pound objects and carry them he states he does not really feel ready for this.  He states the incision has healed well and denies redness drainage or dehiscence.  He is attending therapy at [address]  He denies need for pain medication or NSAIDs      No Known Allergies     Social History     Socioeconomic History    Marital status:    Tobacco Use    Smoking status: Never     Passive exposure: Never    Smokeless tobacco: Never   Vaping Use    Vaping status: Never Used   Substance and Sexual Activity    Alcohol use: No    Drug use: No    Sexual activity: Defer     Partners: Female     Birth control/protection: Condom, Spermicide        REVIEW OF SYSTEMS    Constitutional: Awake alert and oriented x3, no acute distress, denies fevers, chills, weight loss  Respiratory: No respiratory distress  Vascular: Brisk cap refill, Intact distal pulses, No cyanosis, compartments soft with no signs or symptoms of compartment syndrome or DVT.   Cardiovascular: Denies chest pain, shortness of breath  Skin: Denies rashes, acute skin changes  Neurologic: Denies headache, loss of consciousness  MSK: Right knee pain      Objective   Vital Signs:   /72   Pulse 68   Ht 171.5 cm (67.52\")   Wt 81.6 kg (180 lb)   SpO2 92%   BMI 27.76 kg/m²     Body mass index is 27.76 kg/m².    Physical Exam       Right knee: Healed incision, no erythema, no ecchymosis, no swelling, no effusion, no signs of infection, full extension, flexion 120, stable " anterior/posterior drawer, stable to varus/valgus stress.  Nontender to palpation, no pain with range of motion. Negative dEvin, Negative Lachman.      Procedures    Imaging Results (Most Recent)       Procedure Component Value Units Date/Time    XR Knee 1 or 2 View Right [[id number]] Resulted: 01/23/25 1450     Updated: 01/23/25 1450    Narrative:      View:AP/Lateral view(s)  Site: Right knee  Indication: Right knee pain  Study: X-rays ordered, taken in the office, and reviewed today  X-ray: Healing patella ORIF with intact screws, fracture line is still   visible, no increased displacement or angulation  Comparative data: Previous studies               Result Review :   The following data was reviewed by: JARED Chakraborty on 01/23/2025:  Data reviewed : Radiologic studies reviewed by me with the patient              Assessment and Plan    Diagnoses and all orders for this visit:    1. Aftercare following surgery of Right PATELLA OPEN REDUCTION INTERNAL FIXATION 10/28/24 (Primary)  -     Ambulatory Referral to Physical Therapy for Evaluation & Treatment    2. Right knee pain, unspecified chronicity  -     XR Knee 1 or 2 View Right  -     Ambulatory Referral to Physical Therapy for Evaluation & Treatment        Reviewed x-rays with the patient advised him we will order bone stimulator, continue therapy for strength and range of motion, remain off of work for now follow-up in 6 weeks for recheck with x-rays    Call or return if worsening symptoms.    Follow Up   Return in about 6 weeks (around 3/6/2025) for Recheck.  Patient was given instructions and counseling regarding his condition or for health maintenance advice. Please see specific information pulled into the AVS if appropriate.       EMR Dragon/Transcription disclaimer:  Part of this note may be an electronic transcription/translation of spoken language to printed text using the Dragon Dictation System

## 2025-01-24 DIAGNOSIS — S82.001A CLOSED DISPLACED FRACTURE OF RIGHT PATELLA, UNSPECIFIED FRACTURE MORPHOLOGY, INITIAL ENCOUNTER: Primary | ICD-10-CM

## 2025-01-27 ENCOUNTER — TELEPHONE (OUTPATIENT)
Dept: ORTHOPEDIC SURGERY | Facility: CLINIC | Age: 75
End: 2025-01-27

## 2025-01-27 NOTE — TELEPHONE ENCOUNTER
Caller: CHRIS- W/C ADJ    Best call back number: [phone]    What form or medical record are you requesting: ADDITIONAL PHYSICAL THERAPY ORDER FOR THE RIGHT KNEE FROM 01/23/2025- PROGNOTE 01/23/2025 AND WORK STATUS     Who is requesting this form or medical record from you: W/C     How would you like to receive the form or medical records (pick-up, mail, fax): FAX  If fax, what is the fax number: [phone] CLAIM NUMBER [id number]- NEEDS TO BE ON THE ORDER     Timeframe paperwork needed: ASAP

## 2025-01-28 PROBLEM — S82.041K: Status: ACTIVE | Noted: 2025-01-28

## 2025-03-06 ENCOUNTER — OFFICE VISIT (OUTPATIENT)
Dept: ORTHOPEDIC SURGERY | Facility: CLINIC | Age: 75
End: 2025-03-06
Payer: OTHER MISCELLANEOUS

## 2025-03-06 VITALS
DIASTOLIC BLOOD PRESSURE: 85 MMHG | WEIGHT: 180 LBS | SYSTOLIC BLOOD PRESSURE: 151 MMHG | BODY MASS INDEX: 27.28 KG/M2 | HEART RATE: 78 BPM | OXYGEN SATURATION: 93 % | HEIGHT: 68 IN

## 2025-03-06 DIAGNOSIS — Z47.89 AFTERCARE FOLLOWING SURGERY OF THE MUSCULOSKELETAL SYSTEM: Primary | ICD-10-CM

## 2025-03-06 DIAGNOSIS — M25.561 RIGHT KNEE PAIN, UNSPECIFIED CHRONICITY: ICD-10-CM

## 2025-03-06 NOTE — PROGRESS NOTES
"Chief Complaint  Follow-up of the Right Knee    Subjective          History of Present Illness      Gilberto Sanders is a 75 y.o. male  presents to [facility] for     Patient presents for follow-up evaluation of right patella ORIF, 10/28/2024 this is a Workmen's Comp. injury.  He states he continues to progress with therapy.  He is going up and down stairs with therapy exercises carrying about a 30 pound box which he states is getting better but he still has weakness.  He states his job would require him to lift up to 100 pounds and he states he has a lot of stairs at his job and states that he does not feel ready to return to this.  At last visit we ordered a bone stimulator he states he has been using it for the last 4 weeks.  He denies pain or need for pain medication he presents without a brace or assistive ambulatory devices.      No Known Allergies     Social History     Socioeconomic History    Marital status:    Tobacco Use    Smoking status: Never     Passive exposure: Never    Smokeless tobacco: Never   Vaping Use    Vaping status: Never Used   Substance and Sexual Activity    Alcohol use: No    Drug use: No    Sexual activity: Defer     Partners: Female     Birth control/protection: Condom, Spermicide        REVIEW OF SYSTEMS    Constitutional: Awake alert and oriented x3, no acute distress, denies fevers, chills, weight loss  Respiratory: No respiratory distress  Vascular: Brisk cap refill, Intact distal pulses, No cyanosis, compartments soft with no signs or symptoms of compartment syndrome or DVT.   Cardiovascular: Denies chest pain, shortness of breath  Skin: Denies rashes, acute skin changes  Neurologic: Denies headache, loss of consciousness  MSK: Right knee pain      Objective   Vital Signs:   /85   Pulse 78   Ht 171.5 cm (67.52\")   Wt 81.6 kg (180 lb)   SpO2 93%   BMI 27.76 kg/m²     Body mass index is 27.76 kg/m².    Physical Exam       Right knee: " Well healed incision, no erythema, no ecchymosis, no swelling no effusion or signs of infection full extension flexion 120, stable anterior/posterior drawer, stable to varus/valgus stress no pain with range of motion nontender to palpation, strength appropriate, negative Edvin negative Lachman negative Danica testing.      Procedures    Imaging Results (Most Recent)       Procedure Component Value Units Date/Time    XR Knee 1 or 2 View Right [[id number]] Resulted: 03/06/25 1339     Updated: 03/06/25 1339    Narrative:      View:AP/Lateral view(s)  Site: Right knee  Indication: Right knee pain  Study: X-rays ordered, taken in the office, and reviewed today  X-ray: Healing patella ORIF with intact screws, fracture line is still   visible no increased displacement or angulation.  Comparative data: Previous studies             Result Review :   The following data was reviewed by: JARED Chakraborty on 03/06/2025:  Data reviewed : Radiologic studies reviewed by me with the patient              Assessment and Plan    Diagnoses and all orders for this visit:    1. Aftercare following surgery of Right PATELLA OPEN REDUCTION INTERNAL FIXATION 10/28/24 (Primary)  -     Ambulatory Referral to Physical Therapy for Evaluation & Treatment    2. Right knee pain, unspecified chronicity  -     XR Knee 1 or 2 View Right  -     Ambulatory Referral to Physical Therapy for Evaluation & Treatment        Reviewed x-rays with the patient discussed diagnosis and treatment options with him he was advised to continue therapy, remain off of work, continue bone stimulator use follow-up in 6 weeks for recheck with x-rays    Call or return if worsening symptoms.    Follow Up   Return in about 6 weeks (around 4/17/2025) for Recheck.  Patient was given instructions and counseling regarding his condition or for health maintenance advice. Please see specific information pulled into the AVS if appropriate.       EMR Dragon/Transcription  disclaimer:  Part of this note may be an electronic transcription/translation of spoken language to printed text using the Dragon Dictation System

## 2025-03-10 ENCOUNTER — TELEPHONE (OUTPATIENT)
Dept: ORTHOPEDIC SURGERY | Facility: CLINIC | Age: 75
End: 2025-03-10

## 2025-03-10 NOTE — TELEPHONE ENCOUNTER
Caller: JANE    Relationship: [employer]     Best call back number: [phone]    What form or medical record are you requesting: OFFICE VISIT NOTES, ORDERS AND WORK STATUS FROM 3-6-25 APPOINTMENT     Who is requesting this form or medical record from you: TRAVELERS     How would you like to receive the form or medical records (pick-up, mail, fax): FAX  If fax, what is the fax number: [phone]  PLEASE PUT CLAIM NUMBER ON FAX  CLAIM NUMBER:  [id number]

## 2025-04-09 ENCOUNTER — TRANSCRIBE ORDERS (OUTPATIENT)
Dept: LAB | Facility: HOSPITAL | Age: 75
End: 2025-04-09
Payer: COMMERCIAL

## 2025-04-09 ENCOUNTER — LAB (OUTPATIENT)
Dept: LAB | Facility: HOSPITAL | Age: 75
End: 2025-04-09
Payer: MEDICARE

## 2025-04-09 DIAGNOSIS — N18.32 CHRONIC KIDNEY DISEASE (CKD) STAGE G3B/A1, MODERATELY DECREASED GLOMERULAR FILTRATION RATE (GFR) BETWEEN 30-44 ML/MIN/1.73 SQUARE METER AND ALBUMINURIA CREATININE RATIO LESS THAN 30 MG/G (CMS/H*: ICD-10-CM

## 2025-04-09 DIAGNOSIS — E55.9 AVITAMINOSIS D: ICD-10-CM

## 2025-04-09 DIAGNOSIS — M10.9 GOUT, UNSPECIFIED CAUSE, UNSPECIFIED CHRONICITY, UNSPECIFIED SITE: ICD-10-CM

## 2025-04-09 DIAGNOSIS — N18.32 CHRONIC KIDNEY DISEASE (CKD) STAGE G3B/A1, MODERATELY DECREASED GLOMERULAR FILTRATION RATE (GFR) BETWEEN 30-44 ML/MIN/1.73 SQUARE METER AND ALBUMINURIA CREATININE RATIO LESS THAN 30 MG/G (CMS/H*: Primary | ICD-10-CM

## 2025-04-09 LAB
25(OH)D3 SERPL-MCNC: 27.9 NG/ML (ref 30–100)
ALBUMIN SERPL-MCNC: 4.4 G/DL (ref 3.5–5.2)
ANION GAP SERPL CALCULATED.3IONS-SCNC: 10.4 MMOL/L (ref 5–15)
BASOPHILS # BLD AUTO: 0.1 10*3/MM3 (ref 0–0.2)
BASOPHILS NFR BLD AUTO: 1.3 % (ref 0–1.5)
BILIRUB UR QL STRIP: NEGATIVE
BUN SERPL-MCNC: 40 MG/DL (ref 8–23)
BUN/CREAT SERPL: 18.7 (ref 7–25)
CALCIUM SPEC-SCNC: 10.2 MG/DL (ref 8.6–10.5)
CHLORIDE SERPL-SCNC: 107 MMOL/L (ref 98–107)
CLARITY UR: CLEAR
CO2 SERPL-SCNC: 23.6 MMOL/L (ref 22–29)
COLOR UR: YELLOW
CREAT SERPL-MCNC: 2.14 MG/DL (ref 0.76–1.27)
CREAT UR-MCNC: 174 MG/DL
DEPRECATED RDW RBC AUTO: 48 FL (ref 37–54)
EGFRCR SERPLBLD CKD-EPI 2021: 31.5 ML/MIN/1.73
EOSINOPHIL # BLD AUTO: 0.29 10*3/MM3 (ref 0–0.4)
EOSINOPHIL NFR BLD AUTO: 3.8 % (ref 0.3–6.2)
ERYTHROCYTE [DISTWIDTH] IN BLOOD BY AUTOMATED COUNT: 13.5 % (ref 12.3–15.4)
GLUCOSE SERPL-MCNC: 88 MG/DL (ref 65–99)
GLUCOSE UR STRIP-MCNC: NEGATIVE MG/DL
HCT VFR BLD AUTO: 44.2 % (ref 37.5–51)
HGB BLD-MCNC: 15.1 G/DL (ref 13–17.7)
HGB UR QL STRIP.AUTO: NEGATIVE
IMM GRANULOCYTES # BLD AUTO: 0.03 10*3/MM3 (ref 0–0.05)
IMM GRANULOCYTES NFR BLD AUTO: 0.4 % (ref 0–0.5)
KETONES UR QL STRIP: ABNORMAL
LEUKOCYTE ESTERASE UR QL STRIP.AUTO: NEGATIVE
LYMPHOCYTES # BLD AUTO: 1.89 10*3/MM3 (ref 0.7–3.1)
LYMPHOCYTES NFR BLD AUTO: 24.6 % (ref 19.6–45.3)
MCH RBC QN AUTO: 33.4 PG (ref 26.6–33)
MCHC RBC AUTO-ENTMCNC: 34.2 G/DL (ref 31.5–35.7)
MCV RBC AUTO: 97.8 FL (ref 79–97)
MONOCYTES # BLD AUTO: 0.6 10*3/MM3 (ref 0.1–0.9)
MONOCYTES NFR BLD AUTO: 7.8 % (ref 5–12)
NEUTROPHILS NFR BLD AUTO: 4.78 10*3/MM3 (ref 1.7–7)
NEUTROPHILS NFR BLD AUTO: 62.1 % (ref 42.7–76)
NITRITE UR QL STRIP: NEGATIVE
NRBC BLD AUTO-RTO: 0 /100 WBC (ref 0–0.2)
PH UR STRIP.AUTO: 5.5 [PH] (ref 5–8)
PHOSPHATE SERPL-MCNC: 3.1 MG/DL (ref 2.5–4.5)
PLATELET # BLD AUTO: 195 10*3/MM3 (ref 140–450)
PMV BLD AUTO: 12.3 FL (ref 6–12)
POTASSIUM SERPL-SCNC: 4.9 MMOL/L (ref 3.5–5.2)
PROT ?TM UR-MCNC: 13.8 MG/DL
PROT UR QL STRIP: ABNORMAL
PROT/CREAT UR: 0.08 MG/G{CREAT}
RBC # BLD AUTO: 4.52 10*6/MM3 (ref 4.14–5.8)
SODIUM SERPL-SCNC: 141 MMOL/L (ref 136–145)
SP GR UR STRIP: 1.02 (ref 1–1.03)
URATE SERPL-MCNC: 5.1 MG/DL (ref 3.4–7)
UROBILINOGEN UR QL STRIP: ABNORMAL
WBC NRBC COR # BLD AUTO: 7.69 10*3/MM3 (ref 3.4–10.8)

## 2025-04-09 PROCEDURE — 81003 URINALYSIS AUTO W/O SCOPE: CPT

## 2025-04-09 PROCEDURE — 82570 ASSAY OF URINE CREATININE: CPT

## 2025-04-09 PROCEDURE — 80069 RENAL FUNCTION PANEL: CPT

## 2025-04-09 PROCEDURE — 84156 ASSAY OF PROTEIN URINE: CPT

## 2025-04-09 PROCEDURE — 83970 ASSAY OF PARATHORMONE: CPT

## 2025-04-09 PROCEDURE — 36415 COLL VENOUS BLD VENIPUNCTURE: CPT

## 2025-04-09 PROCEDURE — 84550 ASSAY OF BLOOD/URIC ACID: CPT

## 2025-04-09 PROCEDURE — 82306 VITAMIN D 25 HYDROXY: CPT

## 2025-04-09 PROCEDURE — 85025 COMPLETE CBC W/AUTO DIFF WBC: CPT

## 2025-04-10 LAB — PTH-INTACT SERPL-MCNC: 72.4 PG/ML (ref 15–65)

## 2025-04-17 ENCOUNTER — OFFICE VISIT (OUTPATIENT)
Dept: ORTHOPEDIC SURGERY | Facility: CLINIC | Age: 75
End: 2025-04-17
Payer: OTHER MISCELLANEOUS

## 2025-04-17 VITALS
HEIGHT: 68 IN | WEIGHT: 205 LBS | DIASTOLIC BLOOD PRESSURE: 76 MMHG | OXYGEN SATURATION: 93 % | SYSTOLIC BLOOD PRESSURE: 129 MMHG | BODY MASS INDEX: 31.07 KG/M2 | HEART RATE: 68 BPM

## 2025-04-17 DIAGNOSIS — Z47.89 AFTERCARE FOLLOWING SURGERY OF THE MUSCULOSKELETAL SYSTEM: Primary | ICD-10-CM

## 2025-04-17 DIAGNOSIS — M25.561 RIGHT KNEE PAIN, UNSPECIFIED CHRONICITY: ICD-10-CM

## 2025-04-17 NOTE — PROGRESS NOTES
"Chief Complaint  Follow-up of the Right Knee    Subjective          History of Present Illness      Gilberto Sanders is a 75 y.o. male  presents to [facility] for     Patient presents for follow-up evaluation of right patella ORIF, 10/28/2024 this is a Workmen's Comp. injury.  Patient states that he has paused therapy for a few weeks he states that therapy has been helpful he states that they had concern that they were making his knee have delayed healing and therefore they told him to stop.  He has been using the bone stimulator and states that he has been compliant with this he denies need for pain medication or NSAIDs, he remains off of work.      No Known Allergies     Social History     Socioeconomic History    Marital status:    Tobacco Use    Smoking status: Never     Passive exposure: Never    Smokeless tobacco: Never   Vaping Use    Vaping status: Never Used   Substance and Sexual Activity    Alcohol use: No    Drug use: No    Sexual activity: Defer     Partners: Female     Birth control/protection: Condom, Spermicide        REVIEW OF SYSTEMS    Constitutional: Awake alert and oriented x3, no acute distress, denies fevers, chills, weight loss  Respiratory: No respiratory distress  Vascular: Brisk cap refill, Intact distal pulses, No cyanosis, compartments soft with no signs or symptoms of compartment syndrome or DVT.   Cardiovascular: Denies chest pain, shortness of breath  Skin: Denies rashes, acute skin changes  Neurologic: Denies headache, loss of consciousness  MSK: Right knee pain      Objective   Vital Signs:   /76   Pulse 68   Ht 171.5 cm (67.52\")   Wt 93 kg (205 lb)   SpO2 93%   BMI 31.61 kg/m²     Body mass index is 31.61 kg/m².    Physical Exam       Right knee: Well-healed incision, no erythema, no ecchymosis or swelling, no signs of infection full extension flexion 120, stable anterior/posterior drawer stable to varus/valgus stress no pain with " range of motion nontender to palpation, no pain with resisted range of motion, 5 out of 5 strength.  Neurovascularly intact.      Procedures    Imaging Results (Most Recent)       Procedure Component Value Units Date/Time    XR Knee 1 or 2 View Right [[id number]] Resulted: 04/17/25 1542     Updated: 04/17/25 1542    Narrative:      View:AP/Lateral view(s)  Site: Right knee  Indication: Right knee pain  Study: X-rays ordered, taken in the office, and reviewed today  X-ray: Healing patella fracture with intact screws, no signs of hardware   failure or loosening fracture alignment improving in appearance compared   to previous studies  Comparative data: Previous studies                   Assessment and Plan    Diagnoses and all orders for this visit:    1. Aftercare following surgery of Right PATELLA OPEN REDUCTION INTERNAL FIXATION 10/28/24 (Primary)  -     Ambulatory Referral to Physical Therapy for Evaluation & Treatment    2. Right knee pain, unspecified chronicity  -     XR Knee 1 or 2 View Right  -     Ambulatory Referral to Physical Therapy for Evaluation & Treatment        Reviewed x-rays with the patient, Dr. Montoya also reviewed x-rays patient was advised of improved healing to the patella we discussed continuing bone stimulator use, continue therapy new orders written and to remain off of work, follow-up in 6 weeks for recheck    Call or return if worsening symptoms.    Follow Up   Return in about 6 weeks (around 5/29/2025) for Recheck.  Patient was given instructions and counseling regarding his condition or for health maintenance advice. Please see specific information pulled into the AVS if appropriate.       EMR Dragon/Transcription disclaimer:  Part of this note may be an electronic transcription/translation of spoken language to printed text using the Dragon Dictation System

## 2025-04-21 ENCOUNTER — TELEPHONE (OUTPATIENT)
Dept: ORTHOPEDIC SURGERY | Facility: CLINIC | Age: 75
End: 2025-04-21

## 2025-04-21 NOTE — TELEPHONE ENCOUNTER
Caller: JANE ESPINOZA    Relationship: W/C    Best call back number: [phone]    What form or medical record are you requesting: OV NOTES, ORDERS ANYTHING FROM 4/17/25    Who is requesting this form or medical record from you: W/C    How would you like to receive the form or medical records (pick-up, mail, fax): FAX  If fax, what is the fax number: [phone]    Timeframe paperwork needed:     Additional notes: PLACE CLAIM NUMBER ON FRONT OF FAX - [id number] SO IT CAN BE ROUTED TO CORRECT PLACE

## 2025-04-23 ENCOUNTER — TELEPHONE (OUTPATIENT)
Dept: ORTHOPEDIC SURGERY | Facility: CLINIC | Age: 75
End: 2025-04-23

## 2025-04-23 NOTE — TELEPHONE ENCOUNTER
Caller: JANE    Relationship: [employer]    Best call back number: [phone]    What form or medical record are you requesting: NOTE STATING PT'S ABILITY TO DRIVE    Who is requesting this form or medical record from you: [employer]    How would you like to receive the form or medical records (pick-up, mail, fax): FAX  If fax, what is the fax number: [phone] CLAIM NUMBER [id number]    Timeframe paperwork needed: ASAP    Additional notes: IN ORDER FOR PT TO RECEIVE TRANSPORTATION FOR APPTS,   REQUESTING WRITTEN NOTE REGARDING PT'S ABILITY TO DRIVE A CAR.

## 2025-05-13 RX ORDER — CARVEDILOL 3.12 MG/1
3.12 TABLET ORAL 2 TIMES DAILY
Qty: 180 TABLET | Refills: 2 | Status: SHIPPED | OUTPATIENT
Start: 2025-05-13

## 2025-05-13 NOTE — TELEPHONE ENCOUNTER
Rx Refill Note  Requested Prescriptions     Signed Prescriptions Disp Refills    carvedilol (COREG) 3.125 MG tablet 180 tablet 2     Sig: TAKE 1 TABLET BY MOUTH TWICE DAILY FOR BLOOD PRESSURE     Authorizing Provider: FRANCA DE JESUS     Ordering User: YOANA LAKE      Last office visit with prescribing clinician: 12/31/2024   Last telemedicine visit with prescribing clinician: Visit date not found   Next office visit with prescribing clinician: 7/7/2025                         Would you like a call back once the refill request has been completed: [] Yes [] No    If the office needs to give you a call back, can they leave a voicemail: [] Yes [] No    Yoana Lake MA  05/13/25, 10:27 EDT

## 2025-05-29 ENCOUNTER — OFFICE VISIT (OUTPATIENT)
Dept: ORTHOPEDIC SURGERY | Facility: CLINIC | Age: 75
End: 2025-05-29
Payer: OTHER MISCELLANEOUS

## 2025-05-29 VITALS
OXYGEN SATURATION: 92 % | SYSTOLIC BLOOD PRESSURE: 119 MMHG | BODY MASS INDEX: 30.31 KG/M2 | DIASTOLIC BLOOD PRESSURE: 75 MMHG | HEART RATE: 78 BPM | HEIGHT: 68 IN | WEIGHT: 200 LBS

## 2025-05-29 DIAGNOSIS — M25.561 RIGHT KNEE PAIN, UNSPECIFIED CHRONICITY: ICD-10-CM

## 2025-05-29 DIAGNOSIS — Z47.89 AFTERCARE FOLLOWING SURGERY OF THE MUSCULOSKELETAL SYSTEM: Primary | ICD-10-CM

## 2025-05-29 PROCEDURE — 99213 OFFICE O/P EST LOW 20 MIN: CPT | Performed by: PHYSICIAN ASSISTANT

## 2025-05-29 NOTE — PROGRESS NOTES
"Chief Complaint  Follow-up of the Right Knee    Subjective          History of Present Illness      Gilberto Sanders is a 75 y.o. male  presents to [facility] for     Patient presents for follow-up evaluation of right patella ORIF, 10/28/2024.  This is a Workmen's Comp. case.  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  He states this is a concern for him because at his job to return to work he would have 3 flights of stairs that he would have to go up and down.  Patient would like to remain off of work.  He continues to use his bone stimulator and would like to continue therapy.      No Known Allergies     Social History     Socioeconomic History    Marital status:    Tobacco Use    Smoking status: Never     Passive exposure: Never    Smokeless tobacco: Never   Vaping Use    Vaping status: Never Used   Substance and Sexual Activity    Alcohol use: No    Drug use: No    Sexual activity: Defer     Partners: Female     Birth control/protection: Condom, Spermicide        REVIEW OF SYSTEMS    Constitutional: Awake alert and oriented x3, no acute distress, denies fevers, chills, weight loss  Respiratory: No respiratory distress  Vascular: Brisk cap refill, Intact distal pulses, No cyanosis, compartments soft with no signs or symptoms of compartment syndrome or DVT.   Cardiovascular: Denies chest pain, shortness of breath  Skin: Denies rashes, acute skin changes  Neurologic: Denies headache, loss of consciousness  MSK: Right knee pain      Objective   Vital Signs:   /75   Pulse 78   Ht 171.5 cm (67.52\")   Wt 90.7 kg (200 lb)   SpO2 92%   BMI 30.84 kg/m²     Body mass index is 30.84 kg/m².    Physical Exam       Right knee: Well-healed incision, no erythema, no ecchymosis or swelling, no signs of infection, full extension flexion " 120, stable anterior/posterior drawer stable to varus/valgus stress no pain with range of motion nontender to palpation, no pain with resisted range of motion 5 out of 5 strength, neurovascularly intact      Procedures    Imaging Results (Most Recent)       Procedure Component Value Units Date/Time    XR Knee 1 or 2 View Right [[id number]] Resulted: 05/29/25 1548     Updated: 05/29/25 1548    Narrative:      View:AP/Lateral view(s)  Site: Right knee  Indication: Right knee pain  Study: X-rays ordered, taken in the office, and reviewed today  X-ray: Good healing of patella fracture with intact screws, no signs of   hardware failure or loosening, improved appearance of fracture line with   callus formation present.  Comparative data: Previous studies                   Assessment and Plan    Diagnoses and all orders for this visit:    1. Aftercare following surgery of Right PATELLA OPEN REDUCTION INTERNAL FIXATION 10/28/24 (Primary)  -     Ambulatory Referral to Physical Therapy for Evaluation & Treatment    2. Right knee pain, unspecified chronicity  -     XR Knee 1 or 2 View Right  -     Ambulatory Referral to Physical Therapy for Evaluation & Treatment        Reviewed x-rays with the patient, discussed diagnosis and treatment options with him he was given a work note to remain off of work per his request he was advised to continue therapy follow-up in 8 weeks for recheck with x-rays    Call or return if worsening symptoms.    Follow Up   Return in about 8 weeks (around 7/24/2025) for Recheck.  Patient was given instructions and counseling regarding his condition or for health maintenance advice. Please see specific information pulled into the AVS if appropriate.       EMR Dragon/Transcription disclaimer:  Part of this note may be an electronic transcription/translation of spoken language to printed text using the Dragon Dictation System

## 2025-06-02 ENCOUNTER — TELEPHONE (OUTPATIENT)
Dept: ORTHOPEDIC SURGERY | Facility: CLINIC | Age: 75
End: 2025-06-02

## 2025-06-02 NOTE — TELEPHONE ENCOUNTER
Caller: JANE ESPINOZA    Relationship: W/C    Best call back number: [phone]    What form or medical record are you requesting: OV NOTES FROM 5/29/25 WITH WORK STATUS AND ORDERS IF ANY    Who is requesting this form or medical record from you: W/C    How would you like to receive the form or medical records (pick-up, mail, fax): FAX  If fax, what is the fax number: [phone] WITH CLAIM# [id number]

## 2025-06-04 ENCOUNTER — TELEPHONE (OUTPATIENT)
Dept: ORTHOPEDIC SURGERY | Facility: CLINIC | Age: 75
End: 2025-06-04
Payer: COMMERCIAL

## 2025-06-04 NOTE — TELEPHONE ENCOUNTER
RECEIVED CALL FROM W/C  JANE ESPINOZA WHO STATED THEY NEED TO KNOW IF PATIENT IS ABLE TO DRIVE HIMSELF AT THIS TIME OR IF HE WILL NEED TO CONTINUE WITH TRANSPORTATION ASSISTANCE. PATIENT IS 7 MONTHS POST INJURY. PLEASE FAX NOTE STATING IF PATIENT IS ABLE TO DRIVE TO JANE Espinoza AT [phone] WITH CLAIM# [id number] ON COVER SHEET.

## 2025-06-10 ENCOUNTER — TELEPHONE (OUTPATIENT)
Dept: ORTHOPEDIC SURGERY | Facility: CLINIC | Age: 75
End: 2025-06-10

## 2025-06-10 NOTE — TELEPHONE ENCOUNTER
Caller: NORA FISHER     Phone Number: [phone] (home) [phone] (work)    Reason for Call:   PATIENT STATES MS. MCCANN CALLED HIM TO SEE IF HE HAD A RIDE TO THERAPY   PATIENT STATES HE THINKS HE HAS RIDE

## 2025-06-23 ENCOUNTER — OFFICE VISIT (OUTPATIENT)
Dept: ORTHOPEDIC SURGERY | Facility: CLINIC | Age: 75
End: 2025-06-23
Payer: OTHER MISCELLANEOUS

## 2025-06-23 VITALS
WEIGHT: 200 LBS | OXYGEN SATURATION: 96 % | BODY MASS INDEX: 30.84 KG/M2 | HEART RATE: 66 BPM | DIASTOLIC BLOOD PRESSURE: 80 MMHG | SYSTOLIC BLOOD PRESSURE: 129 MMHG

## 2025-06-23 DIAGNOSIS — Z47.89 AFTERCARE FOLLOWING SURGERY OF THE MUSCULOSKELETAL SYSTEM: Primary | ICD-10-CM

## 2025-06-23 PROCEDURE — 99213 OFFICE O/P EST LOW 20 MIN: CPT | Performed by: PHYSICIAN ASSISTANT

## 2025-06-23 NOTE — PROGRESS NOTES
Chief Complaint  Follow-up of the Right Knee    Subjective          History of Present Illness      Gilberto Sanders is a 75 y.o. male  presents to [facility] for     Patient presents for follow-up evaluation of right patella ORIF.  10/28/2024.  This is a Workmen's Comp. case.  He states therapy continues to help him he states he carries 30 pound weights up and down stairs but states that this causes him pain.  He states that he would like to continue therapy he is attending at [facility].  He denies need for pain medicine or NSAIDs he continues to use bone stimulator.      No Known Allergies     Social History     Socioeconomic History    Marital status:    Tobacco Use    Smoking status: Never     Passive exposure: Never    Smokeless tobacco: Never   Vaping Use    Vaping status: Never Used   Substance and Sexual Activity    Alcohol use: No    Drug use: No    Sexual activity: Defer     Partners: Female     Birth control/protection: Condom, Spermicide        REVIEW OF SYSTEMS    Constitutional: Awake alert and oriented x3, no acute distress, denies fevers, chills, weight loss  Respiratory: No respiratory distress  Vascular: Brisk cap refill, Intact distal pulses, No cyanosis, compartments soft with no signs or symptoms of compartment syndrome or DVT.   Cardiovascular: Denies chest pain, shortness of breath  Skin: Denies rashes, acute skin changes  Neurologic: Denies headache, loss of consciousness  MSK: Right knee pain      Objective   Vital Signs:   /80   Pulse 66   Wt 90.7 kg (200 lb)   SpO2 96%   BMI 30.84 kg/m²     Body mass index is 30.84 kg/m².    Physical Exam       Right knee: Well-healed incision, no erythema, no ecchymosis, no swelling, no effusion, no signs of infection, full extension, flexion 120, stable anterior/posterior drawer, stable to varus/valgus stress.  Nontender to palpation, no pain with range of motion. Negative Edvin, Negative  Lachman.      Procedures    Imaging Results (Most Recent)       Procedure Component Value Units Date/Time    XR Knee 1 or 2 View Right - Preliminary [[id number]] Resulted: 06/23/25 1114     Updated: 06/23/25 1114    This result has not been signed. Information might be incomplete.      Narrative:      View:AP/Lateral view(s)  Site: Right knee  Indication: Right knee pain  Study: X-rays ordered, taken in the office, and reviewed today  X-ray: Good healing of patella fracture with intact screws, fracture   alignment remains stable with callus formation, improved compared to   previous studies  Comparative data: Previous studies                   Assessment and Plan    Diagnoses and all orders for this visit:    1. Aftercare following surgery of Right PATELLA OPEN REDUCTION INTERNAL FIXATION 10/28/24 (Primary)  -     XR Knee 1 or 2 View Right  -     Ambulatory Referral to Physical Therapy for Evaluation & Treatment        Reviewed x-rays with the patient he will remain off of work continue physical therapy and home exercises, continue bone stimulator use.  Follow-up in 6 weeks for recheck with x-rays    Call or return if worsening symptoms.    Follow Up   Return in about 6 weeks (around 8/4/2025) for Recheck.  Patient was given instructions and counseling regarding his condition or for health maintenance advice. Please see specific information pulled into the AVS if appropriate.       EMR Dragon/Transcription disclaimer:  Part of this note may be an electronic transcription/translation of spoken language to printed text using the Dragon Dictation System

## 2025-07-09 ENCOUNTER — TRANSCRIBE ORDERS (OUTPATIENT)
Dept: LAB | Facility: HOSPITAL | Age: 75
End: 2025-07-09
Payer: COMMERCIAL

## 2025-07-09 ENCOUNTER — LAB (OUTPATIENT)
Dept: LAB | Facility: HOSPITAL | Age: 75
End: 2025-07-09
Payer: MEDICARE

## 2025-07-09 DIAGNOSIS — N18.32 CHRONIC KIDNEY DISEASE (CKD) STAGE G3B/A1, MODERATELY DECREASED GLOMERULAR FILTRATION RATE (GFR) BETWEEN 30-44 ML/MIN/1.73 SQUARE METER AND ALBUMINURIA CREATININE RATIO LESS THAN 30 MG/G (CMS/H*: Primary | ICD-10-CM

## 2025-07-09 DIAGNOSIS — N18.32 CHRONIC KIDNEY DISEASE (CKD) STAGE G3B/A1, MODERATELY DECREASED GLOMERULAR FILTRATION RATE (GFR) BETWEEN 30-44 ML/MIN/1.73 SQUARE METER AND ALBUMINURIA CREATININE RATIO LESS THAN 30 MG/G (CMS/H*: ICD-10-CM

## 2025-07-09 LAB
ALBUMIN SERPL-MCNC: 4.4 G/DL (ref 3.5–5.2)
ANION GAP SERPL CALCULATED.3IONS-SCNC: 11.3 MMOL/L (ref 5–15)
BASOPHILS # BLD AUTO: 0.09 10*3/MM3 (ref 0–0.2)
BASOPHILS NFR BLD AUTO: 1.3 % (ref 0–1.5)
BILIRUB UR QL STRIP: NEGATIVE
BUN SERPL-MCNC: 31 MG/DL (ref 8–23)
BUN/CREAT SERPL: 14.8 (ref 7–25)
CALCIUM SPEC-SCNC: 10.3 MG/DL (ref 8.6–10.5)
CHLORIDE SERPL-SCNC: 107 MMOL/L (ref 98–107)
CLARITY UR: CLEAR
CO2 SERPL-SCNC: 20.7 MMOL/L (ref 22–29)
COLOR UR: YELLOW
CREAT SERPL-MCNC: 2.09 MG/DL (ref 0.76–1.27)
CREAT UR-MCNC: 97.7 MG/DL
DEPRECATED RDW RBC AUTO: 47.9 FL (ref 37–54)
EGFRCR SERPLBLD CKD-EPI 2021: 32.4 ML/MIN/1.73
EOSINOPHIL # BLD AUTO: 0.21 10*3/MM3 (ref 0–0.4)
EOSINOPHIL NFR BLD AUTO: 3 % (ref 0.3–6.2)
ERYTHROCYTE [DISTWIDTH] IN BLOOD BY AUTOMATED COUNT: 13.6 % (ref 12.3–15.4)
GLUCOSE SERPL-MCNC: 100 MG/DL (ref 65–99)
GLUCOSE UR STRIP-MCNC: NEGATIVE MG/DL
HCT VFR BLD AUTO: 45.6 % (ref 37.5–51)
HGB BLD-MCNC: 15.6 G/DL (ref 13–17.7)
HGB UR QL STRIP.AUTO: NEGATIVE
IMM GRANULOCYTES # BLD AUTO: 0.01 10*3/MM3 (ref 0–0.05)
IMM GRANULOCYTES NFR BLD AUTO: 0.1 % (ref 0–0.5)
KETONES UR QL STRIP: NEGATIVE
LEUKOCYTE ESTERASE UR QL STRIP.AUTO: NEGATIVE
LYMPHOCYTES # BLD AUTO: 1.76 10*3/MM3 (ref 0.7–3.1)
LYMPHOCYTES NFR BLD AUTO: 25.5 % (ref 19.6–45.3)
MCH RBC QN AUTO: 33.3 PG (ref 26.6–33)
MCHC RBC AUTO-ENTMCNC: 34.2 G/DL (ref 31.5–35.7)
MCV RBC AUTO: 97.4 FL (ref 79–97)
MONOCYTES # BLD AUTO: 0.53 10*3/MM3 (ref 0.1–0.9)
MONOCYTES NFR BLD AUTO: 7.7 % (ref 5–12)
NEUTROPHILS NFR BLD AUTO: 4.31 10*3/MM3 (ref 1.7–7)
NEUTROPHILS NFR BLD AUTO: 62.4 % (ref 42.7–76)
NITRITE UR QL STRIP: NEGATIVE
NRBC BLD AUTO-RTO: 0 /100 WBC (ref 0–0.2)
PH UR STRIP.AUTO: 5.5 [PH] (ref 5–8)
PHOSPHATE SERPL-MCNC: 2.4 MG/DL (ref 2.5–4.5)
PLATELET # BLD AUTO: 215 10*3/MM3 (ref 140–450)
PMV BLD AUTO: 11.9 FL (ref 6–12)
POTASSIUM SERPL-SCNC: 4.4 MMOL/L (ref 3.5–5.2)
PROT ?TM UR-MCNC: 8.2 MG/DL
PROT UR QL STRIP: NEGATIVE
PROT/CREAT UR: 0.08 MG/G{CREAT}
RBC # BLD AUTO: 4.68 10*6/MM3 (ref 4.14–5.8)
SODIUM SERPL-SCNC: 139 MMOL/L (ref 136–145)
SP GR UR STRIP: 1.01 (ref 1–1.03)
UROBILINOGEN UR QL STRIP: NORMAL
WBC NRBC COR # BLD AUTO: 6.91 10*3/MM3 (ref 3.4–10.8)

## 2025-07-09 PROCEDURE — 80069 RENAL FUNCTION PANEL: CPT

## 2025-07-09 PROCEDURE — 84156 ASSAY OF PROTEIN URINE: CPT

## 2025-07-09 PROCEDURE — 81003 URINALYSIS AUTO W/O SCOPE: CPT

## 2025-07-09 PROCEDURE — 36415 COLL VENOUS BLD VENIPUNCTURE: CPT

## 2025-07-09 PROCEDURE — 82570 ASSAY OF URINE CREATININE: CPT

## 2025-07-09 PROCEDURE — 85025 COMPLETE CBC W/AUTO DIFF WBC: CPT

## 2025-07-10 NOTE — PROGRESS NOTES
[facility]       Name: Gilberto Sanders ADMIT: (Not on file)   : 1950  PCP: Amarilys Bear MD    MRN: [id number] LOS: 0 days   AGE/SEX: 75 y.o. male  ROOM: Room/bed info not found     Chief Complaint   Patient presents with    Follow-up       Subjective       History of present illness  Gilberto Sanders is a 75 year old male with a history of CKD, coronary disease status post coronary bypass surgery, history of paroxysmal fibrillation s/p ablation,  hypertension, hyperlipidemia who presents today for a 6 month follow-up. Patient is doing well at home with no current complaints. His blood pressure is slightly elevated today, but home pressures run in the 120s/80s. He does follow with a nephrologist for his kidney disease.        Past Medical History:   Diagnosis Date    A-2023    Anemia     Chronic kidney disease (CKD)     Coronary artery disease     Hyperlipidemia     Hypertension     Multiple rib fractures     from a fall early spring 2023, healed without intervention    Myocardial infarction     RSV (acute bronchiolitis due to respiratory syncytial virus) 2024    Skin cancer     back, chest, arms (lots of things burned off)     Past Surgical History:   Procedure Laterality Date    ABLATION OF DYSRHYTHMIC FOCUS      APPENDECTOMY      CARDIAC ELECTROPHYSIOLOGY PROCEDURE N/A 2023    Procedure: Ablation atrial fibrillation, RF, rep emailed;  Surgeon: Adrian Valentino MD;  Location: [facility] CATH INVASIVE LOC[facility];  Service: Cardiovascular;  Laterality: N/A;    CORONARY ANGIOPLASTY      CORONARY ARTERY BYPASS GRAFT  08/30/2017    X10 Dr Kruger    CORONARY STENT PLACEMENT  1984    3 stent    PATELLA OPEN REDUCTION INTERNAL FIXATION Right 10/28/2024    Procedure: PATELLA OPEN REDUCTION INTERNAL FIXATION;  Surgeon: Aníbal Montoya MD;  [facility];  Service: Orthopedics;  Laterality: Right;    RENAL ARTERY STENT      SKIN CANCER EXCISION        Family History   Problem Relation Age of Onset    Cancer Mother     Stroke Father     Heart disease Father     Malig Hyperthermia Neg Hx      Social History     Tobacco Use    Smoking status: Never     Passive exposure: Never    Smokeless tobacco: Never   Vaping Use    Vaping status: Never Used   Substance Use Topics    Alcohol use: No    Drug use: No       Current Outpatient Medications:     albuterol sulfate  (90 Base) MCG/ACT inhaler, Inhale 2 puffs Every 4 (Four) Hours As Needed for Wheezing or Shortness of Air., Disp: 8 g, Rfl: 0    allopurinol (ZYLOPRIM) 300 MG tablet, Take 1 tablet by mouth Every Night., Disp: , Rfl:     atorvastatin (LIPITOR) 40 MG tablet, Take 1 tablet by mouth Every Night., Disp: , Rfl:     carvedilol (COREG) 3.125 MG tablet, TAKE 1 TABLET BY MOUTH TWICE DAILY FOR BLOOD PRESSURE, Disp: 180 tablet, Rfl: 2    Cholecalciferol 10 MCG (400 UNIT) tablet, Take 1 tablet by mouth Daily., Disp: , Rfl:     famotidine (PEPCID) 20 MG tablet, Take 1 tablet by mouth 2 (Two) Times a Day., Disp: , Rfl:     losartan (COZAAR) 50 MG tablet, Take 1 tablet by mouth Daily., Disp: , Rfl:     apixaban (ELIQUIS) 2.5 MG tablet tablet, Take 1 tablet by mouth Every 12 (Twelve) Hours. Indications: Prevention of Unwanted Clot in Veins (Patient not taking: Reported on 7/14/2025), Disp: 180 tablet, Rfl: 0    aspirin 325 MG tablet, Take 1 tablet by mouth Daily. (Patient not taking: Reported on 7/14/2025), Disp: , Rfl:     ferrous gluconate (FERGON) 324 MG tablet, Take 1 tablet by mouth Daily With Breakfast. (Patient not taking: Reported on 7/14/2025), Disp: , Rfl:     hydrALAZINE (APRESOLINE) 100 MG tablet, Take 0.5 tablets by mouth 2 (Two) Times a Day. (Patient not taking: Reported on 7/14/2025), Disp: , Rfl:     losartan (COZAAR) 25 MG tablet, Take 1 tablet by mouth 2 (Two) Times a Day. (Patient not taking: Reported on 7/14/2025), Disp: , Rfl:   Allergies:  Patient has no known allergies.      Physical  Exam  VITALS REVIEWED    General:      well developed, in no acute distress.    Head:      normocephalic and atraumatic.    Eyes:      PERRL/EOM intact, conjunctiva and sclera clear with out nystagmus.    Neck:      no masses, thyromegaly,  trachea central with normal respiratory effort and PMI displaced laterally  Lungs:      Clear to auscultation bilaterally  Heart:       Regular rhythm and rate  Msk:      no deformity or scoliosis noted of thoracic or lumbar spine.    Pulses:      pulses normal in all 4 extremities.    Extremities:       No lower extremity edema    Neurologic:      no focal deficits.   alert oriented x3  Skin:      intact without lesions or rashes.    Psych:      alert and cooperative; normal mood and affect; normal attention span and concentration.      Result Review :    The following portions of the patient's history were reviewed and updated as appropriate: allergies, current medications, past family history, past medical history, past social history, past surgical history, and problem list.      Pertinent cardiac workup          Procedures        Assessment and Plan        Diagnoses and all orders for this visit:    1. 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 Pure hypercholesterolemia    3. Primary hypertension    4. Paroxysmal atrial fibrillation  Overview:  Added automatically from request for surgery [id number]         Coronary disease  -h/o coronary bypass surgery x 10 vessels and is currently stable with normal V function.    Hypertension  -elevated in office today, normally 120s/80s at home  -stable on losartan, hydralazine, and carvedilol.    Hyperlipidemia  -Patient is on atorvastatin the lipid levels are well within normal limits    Paroxysmal atrial fibrillation  -Patient is currently stable on Eliquis and also on carvedilol for rate control.    Return in about 6 months (around 1/14/2026) for f/u with Dr. Sanz.  Patient was  given instructions and counseling regarding his condition or for health maintenance advice. Please see specific information pulled into the AVS if appropriate.

## 2025-07-14 ENCOUNTER — OFFICE VISIT (OUTPATIENT)
Dept: CARDIOLOGY | Facility: CLINIC | Age: 75
End: 2025-07-14
Payer: MEDICARE

## 2025-07-14 VITALS
DIASTOLIC BLOOD PRESSURE: 88 MMHG | HEART RATE: 58 BPM | OXYGEN SATURATION: 96 % | BODY MASS INDEX: 31.39 KG/M2 | WEIGHT: 200 LBS | SYSTOLIC BLOOD PRESSURE: 152 MMHG | HEIGHT: 67 IN

## 2025-07-14 DIAGNOSIS — E78.00 PURE HYPERCHOLESTEROLEMIA: ICD-10-CM

## 2025-07-14 DIAGNOSIS — I10 PRIMARY HYPERTENSION: ICD-10-CM

## 2025-07-14 DIAGNOSIS — I25.708 CORONARY ARTERY DISEASE OF BYPASS GRAFT OF NATIVE HEART WITH STABLE ANGINA PECTORIS: Primary | ICD-10-CM

## 2025-07-14 DIAGNOSIS — I48.0 PAROXYSMAL ATRIAL FIBRILLATION: ICD-10-CM

## 2025-07-14 PROCEDURE — 99213 OFFICE O/P EST LOW 20 MIN: CPT | Performed by: NURSE PRACTITIONER

## 2025-07-14 PROCEDURE — 3077F SYST BP >= 140 MM HG: CPT | Performed by: NURSE PRACTITIONER

## 2025-07-14 PROCEDURE — 3079F DIAST BP 80-89 MM HG: CPT | Performed by: NURSE PRACTITIONER

## 2025-07-14 RX ORDER — LOSARTAN POTASSIUM 50 MG/1
50 TABLET ORAL DAILY
COMMUNITY

## 2025-07-14 RX ORDER — OMEGA-3S/DHA/EPA/FISH OIL/D3 300MG-1000
400 CAPSULE ORAL DAILY
COMMUNITY

## 2025-07-14 NOTE — LETTER
2025     Morris Sanz MD  [address] [facility] [place] [address]    Patient: Gilberto Sanders   YOB: 1950   Date of Visit: 2025     Dear Morris Sanz MD:       Thank you for referring Gilberto Sanders to me for evaluation. Below are the relevant portions of my assessment and plan of care.    If you have questions, please do not hesitate to call me. I look forward to following Gilberto along with you.         Sincerely,        GEORGINA Marsh        CC: No Recipients    Re Moore APRN  [phone]  Sign when Signing Visit  [facility]       Name: Gilberto Sanders ADMIT: (Not on file)   : 1950  PCP: Amarilys Bear MD    MRN: [id number] LOS: 0 days   AGE/SEX: 75 y.o. male  ROOM: Room/bed info not found     Chief Complaint   Patient presents with   • Follow-up       Subjective      History of present illness  Gilberto Sanders is a 75 year old male with a history of CKD, coronary disease status post coronary bypass surgery, history of paroxysmal fibrillation s/p ablation,  hypertension, hyperlipidemia who presents today for a 6 month follow-up. Patient is doing well at home with no current complaints. His blood pressure is slightly elevated today, but home pressures run in the 120s/80s. He does follow with a nephrologist for his kidney disease.        Past Medical History:   Diagnosis Date   • A-fib    • Anemia    • Chronic kidney disease (CKD)    • Coronary artery disease    • Hyperlipidemia    • Hypertension    • Multiple rib fractures     from a fall early spring 2023, healed without intervention   • Myocardial infarction    • RSV (acute bronchiolitis due to respiratory syncytial virus) 2024   • Skin cancer     back, chest, arms (lots of things burned off)     Past Surgical History:   Procedure Laterality Date   • ABLATION OF DYSRHYTHMIC FOCUS     • APPENDECTOMY     • CARDIAC ELECTROPHYSIOLOGY PROCEDURE N/A  06/30/2023    Procedure: Ablation atrial fibrillation, RF, rep emailed;  Surgeon: Adrian Valetnino MD;  Location:  [facility];  Service: Cardiovascular;  Laterality: N/A;   • CORONARY ANGIOPLASTY     • CORONARY ARTERY BYPASS GRAFT  08/30/2017    X10 Dr Kruger   • CORONARY STENT PLACEMENT  1984    3 stent   • PATELLA OPEN REDUCTION INTERNAL FIXATION Right 10/28/2024    Procedure: PATELLA OPEN REDUCTION INTERNAL FIXATION;  Surgeon: Aníbal Montoya MD;  [place]  [facility] [place];  Service: Orthopedics;  Laterality: Right;   • RENAL ARTERY STENT     • SKIN CANCER EXCISION       Family History   Problem Relation Age of Onset   • Cancer Mother    • Stroke Father    • Heart disease Father    • Malig Hyperthermia Neg Hx      Social History     Tobacco Use   • Smoking status: Never     Passive exposure: Never   • Smokeless tobacco: Never   Vaping Use   • Vaping status: Never Used   Substance Use Topics   • Alcohol use: No   • Drug use: No       Current Outpatient Medications:   •  albuterol sulfate  (90 Base) MCG/ACT inhaler, Inhale 2 puffs Every 4 (Four) Hours As Needed for Wheezing or Shortness of Air., Disp: 8 g, Rfl: 0  •  allopurinol (ZYLOPRIM) 300 MG tablet, Take 1 tablet by mouth Every Night., Disp: , Rfl:   •  atorvastatin (LIPITOR) 40 MG tablet, Take 1 tablet by mouth Every Night., Disp: , Rfl:   •  carvedilol (COREG) 3.125 MG tablet, TAKE 1 TABLET BY MOUTH TWICE DAILY FOR BLOOD PRESSURE, Disp: 180 tablet, Rfl: 2  •  Cholecalciferol 10 MCG (400 UNIT) tablet, Take 1 tablet by mouth Daily., Disp: , Rfl:   •  famotidine (PEPCID) 20 MG tablet, Take 1 tablet by mouth 2 (Two) Times a Day., Disp: , Rfl:   •  losartan (COZAAR) 50 MG tablet, Take 1 tablet by mouth Daily., Disp: , Rfl:   •  apixaban (ELIQUIS) 2.5 MG tablet tablet, Take 1 tablet by mouth Every 12 (Twelve) Hours. Indications: Prevention of Unwanted Clot in Veins (Patient not taking: Reported on 7/14/2025), Disp: 180 tablet, Rfl:  0  •  aspirin 325 MG tablet, Take 1 tablet by mouth Daily. (Patient not taking: Reported on 7/14/2025), Disp: , Rfl:   •  ferrous gluconate (FERGON) 324 MG tablet, Take 1 tablet by mouth Daily With Breakfast. (Patient not taking: Reported on 7/14/2025), Disp: , Rfl:   •  hydrALAZINE (APRESOLINE) 100 MG tablet, Take 0.5 tablets by mouth 2 (Two) Times a Day. (Patient not taking: Reported on 7/14/2025), Disp: , Rfl:   •  losartan (COZAAR) 25 MG tablet, Take 1 tablet by mouth 2 (Two) Times a Day. (Patient not taking: Reported on 7/14/2025), Disp: , Rfl:   Allergies:  Patient has no known allergies.      Physical Exam  VITALS REVIEWED    General:      well developed, in no acute distress.    Head:      normocephalic and atraumatic.    Eyes:      PERRL/EOM intact, conjunctiva and sclera clear with out nystagmus.    Neck:      no masses, thyromegaly,  trachea central with normal respiratory effort and PMI displaced laterally  Lungs:      Clear to auscultation bilaterally  Heart:       Regular rhythm and rate  Msk:      no deformity or scoliosis noted of thoracic or lumbar spine.    Pulses:      pulses normal in all 4 extremities.    Extremities:       No lower extremity edema    Neurologic:      no focal deficits.   alert oriented x3  Skin:      intact without lesions or rashes.    Psych:      alert and cooperative; normal mood and affect; normal attention span and concentration.      Result Review:    The following portions of the patient's history were reviewed and updated as appropriate: allergies, current medications, past family history, past medical history, past social history, past surgical history, and problem list.      Pertinent cardiac workup          Procedures        Assessment and Plan        Diagnoses and all orders for this visit:    1. 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 Pure hypercholesterolemia    3. Primary hypertension    4. Paroxysmal  atrial fibrillation  Overview:  Added automatically from request for surgery [id number]         Coronary disease  -h/o coronary bypass surgery x 10 vessels and is currently stable with normal V function.    Hypertension  -elevated in office today, normally 120s/80s at home  -stable on losartan, hydralazine, and carvedilol.    Hyperlipidemia  -Patient is on atorvastatin the lipid levels are well within normal limits    Paroxysmal atrial fibrillation  -Patient is currently stable on Eliquis and also on carvedilol for rate control.    Return in about 6 months (around 1/14/2026) for f/u with Dr. Sanz.  Patient was given instructions and counseling regarding his condition or for health maintenance advice. Please see specific information pulled into the AVS if appropriate.

## 2025-08-04 ENCOUNTER — OFFICE VISIT (OUTPATIENT)
Dept: ORTHOPEDIC SURGERY | Facility: CLINIC | Age: 75
End: 2025-08-04
Payer: OTHER MISCELLANEOUS

## 2025-08-04 VITALS
DIASTOLIC BLOOD PRESSURE: 85 MMHG | WEIGHT: 200 LBS | HEART RATE: 59 BPM | BODY MASS INDEX: 31.39 KG/M2 | OXYGEN SATURATION: 93 % | SYSTOLIC BLOOD PRESSURE: 146 MMHG | HEIGHT: 67 IN

## 2025-08-04 DIAGNOSIS — Z47.89 AFTERCARE FOLLOWING SURGERY OF THE MUSCULOSKELETAL SYSTEM: Primary | ICD-10-CM

## 2025-08-04 DIAGNOSIS — M25.561 RIGHT KNEE PAIN, UNSPECIFIED CHRONICITY: ICD-10-CM

## 2025-08-04 PROCEDURE — 99213 OFFICE O/P EST LOW 20 MIN: CPT | Performed by: PHYSICIAN ASSISTANT

## 2025-08-07 ENCOUNTER — TELEPHONE (OUTPATIENT)
Dept: ORTHOPEDIC SURGERY | Facility: CLINIC | Age: 75
End: 2025-08-07
Payer: COMMERCIAL

## 2025-08-07 DIAGNOSIS — M25.561 RIGHT KNEE PAIN, UNSPECIFIED CHRONICITY: Primary | ICD-10-CM

## (undated) DEVICE — APPL CHLORAPREP HI/LITE 26ML ORNG

## (undated) DEVICE — PENCL ES MEGADINE EZ/CLEAN BUTN W/HOLSTR 10FT

## (undated) DEVICE — COVER,C-ARM,41X74: Brand: MEDLINE

## (undated) DEVICE — ESMARK: Brand: DEROYAL

## (undated) DEVICE — THREADED GUIDE WIRE

## (undated) DEVICE — STERILE POLYISOPRENE POWDER-FREE SURGICAL GLOVES WITH EMOLLIENT COATING: Brand: PROTEXIS

## (undated) DEVICE — GAUZE,SPONGE,4"X4",16PLY,STRL,LF,10/TRAY: Brand: MEDLINE

## (undated) DEVICE — SUT VIC 2/0 CT1 36IN

## (undated) DEVICE — INTENDED FOR TISSUE SEPARATION, AND OTHER PROCEDURES THAT REQUIRE A SHARP SURGICAL BLADE TO PUNCTURE OR CUT.: Brand: BARD-PARKER ® CARBON RIB-BACK BLADES

## (undated) DEVICE — DISPOSABLE TOURNIQUET CUFF SINGLE BLADDER, SINGLE PORT AND QUICK CONNECT CONNECTOR: Brand: COLOR CUFF

## (undated) DEVICE — EXTREMITY-LF: Brand: MEDLINE INDUSTRIES, INC.

## (undated) DEVICE — CANNULATED DRILL

## (undated) DEVICE — BNDG ELAS MATRX V/CLS 6INX10YD LF

## (undated) DEVICE — WR SUTR PASSING  STRL

## (undated) DEVICE — PROXIMATE RH ROTATING HEAD SKIN STAPLERS (35 WIDE) CONTAINS 35 STAINLESS STEEL STAPLES: Brand: PROXIMATE

## (undated) DEVICE — GLV SURG SENSICARE PI ORTHO SZ8 LF STRL

## (undated) DEVICE — ANTIBACTERIAL VIOLET BRAIDED (POLYGLACTIN 910), SYNTHETIC ABSORBABLE SURGICAL SUTURE: Brand: COATED VICRYL

## (undated) DEVICE — STERILE POLYISOPRENE POWDER-FREE SURGICAL GLOVES: Brand: PROTEXIS

## (undated) DEVICE — DRSNG SURG AQUACEL AG/ADVNTGE 9X25CM 3.5X10IN

## (undated) DEVICE — DRP SURG U/DRP INVISISHIELD PA 48X52IN